# Patient Record
Sex: FEMALE | Race: WHITE | NOT HISPANIC OR LATINO | Employment: OTHER | URBAN - METROPOLITAN AREA
[De-identification: names, ages, dates, MRNs, and addresses within clinical notes are randomized per-mention and may not be internally consistent; named-entity substitution may affect disease eponyms.]

---

## 2017-01-13 ENCOUNTER — OFFICE VISIT - HEALTHEAST (OUTPATIENT)
Dept: FAMILY MEDICINE | Facility: CLINIC | Age: 67
End: 2017-01-13

## 2017-01-13 ENCOUNTER — RECORDS - HEALTHEAST (OUTPATIENT)
Dept: GENERAL RADIOLOGY | Facility: CLINIC | Age: 67
End: 2017-01-13

## 2017-01-13 DIAGNOSIS — M19.011 PRIMARY OSTEOARTHRITIS OF BOTH SHOULDERS: ICD-10-CM

## 2017-01-13 DIAGNOSIS — Z01.818 PREOP EXAMINATION: ICD-10-CM

## 2017-01-13 DIAGNOSIS — R60.9 EDEMA: ICD-10-CM

## 2017-01-13 DIAGNOSIS — M19.012 PRIMARY OSTEOARTHRITIS OF BOTH SHOULDERS: ICD-10-CM

## 2017-01-13 DIAGNOSIS — Z01.818 ENCOUNTER FOR OTHER PREPROCEDURAL EXAMINATION: ICD-10-CM

## 2017-01-13 DIAGNOSIS — L29.9 PRURITUS: ICD-10-CM

## 2017-01-13 DIAGNOSIS — Z87.440 HISTORY OF RECURRENT UTIS: ICD-10-CM

## 2017-01-13 ASSESSMENT — MIFFLIN-ST. JEOR: SCORE: 1251.38

## 2017-01-16 ENCOUNTER — COMMUNICATION - HEALTHEAST (OUTPATIENT)
Dept: FAMILY MEDICINE | Facility: CLINIC | Age: 67
End: 2017-01-16

## 2017-01-18 ENCOUNTER — COMMUNICATION - HEALTHEAST (OUTPATIENT)
Dept: FAMILY MEDICINE | Facility: CLINIC | Age: 67
End: 2017-01-18

## 2017-01-18 ENCOUNTER — AMBULATORY - HEALTHEAST (OUTPATIENT)
Dept: NURSING | Facility: CLINIC | Age: 67
End: 2017-01-18

## 2017-01-18 DIAGNOSIS — Z01.818 PREOP EXAMINATION: ICD-10-CM

## 2017-01-18 LAB
ATRIAL RATE - MUSE: 80 BPM
DIASTOLIC BLOOD PRESSURE - MUSE: NORMAL MMHG
INTERPRETATION ECG - MUSE: NORMAL
P AXIS - MUSE: 75 DEGREES
PR INTERVAL - MUSE: 160 MS
QRS DURATION - MUSE: 88 MS
QT - MUSE: 376 MS
QTC - MUSE: 433 MS
R AXIS - MUSE: 37 DEGREES
SYSTOLIC BLOOD PRESSURE - MUSE: NORMAL MMHG
T AXIS - MUSE: 52 DEGREES
VENTRICULAR RATE- MUSE: 80 BPM

## 2017-01-30 RX ORDER — TRIAMCINOLONE ACETONIDE 1 MG/G
CREAM TOPICAL 4 TIMES DAILY
COMMUNITY
End: 2021-08-23

## 2017-01-30 RX ORDER — LORATADINE 10 MG/1
10 CAPSULE, LIQUID FILLED ORAL DAILY
Status: ON HOLD | COMMUNITY
End: 2017-02-02

## 2017-02-01 ENCOUNTER — ANESTHESIA EVENT (OUTPATIENT)
Dept: SURGERY | Facility: CLINIC | Age: 67
DRG: 483 | End: 2017-02-01
Payer: MEDICARE

## 2017-02-01 ENCOUNTER — HOSPITAL ENCOUNTER (INPATIENT)
Facility: CLINIC | Age: 67
LOS: 4 days | Discharge: HOME | DRG: 483 | End: 2017-02-05
Attending: ORTHOPAEDIC SURGERY | Admitting: ORTHOPAEDIC SURGERY
Payer: MEDICARE

## 2017-02-01 ENCOUNTER — APPOINTMENT (OUTPATIENT)
Dept: GENERAL RADIOLOGY | Facility: CLINIC | Age: 67
DRG: 483 | End: 2017-02-01
Attending: ORTHOPAEDIC SURGERY
Payer: MEDICARE

## 2017-02-01 ENCOUNTER — ANESTHESIA (OUTPATIENT)
Dept: SURGERY | Facility: CLINIC | Age: 67
DRG: 483 | End: 2017-02-01
Payer: MEDICARE

## 2017-02-01 DIAGNOSIS — Z96.611 PRESENCE OF ARTIFICIAL SHOULDER JOINT, RIGHT: Primary | ICD-10-CM

## 2017-02-01 PROBLEM — Z96.619 PRESENCE OF ARTIFICIAL SHOULDER JOINT: Status: ACTIVE | Noted: 2017-02-01

## 2017-02-01 LAB
ABO + RH BLD: NORMAL
ABO + RH BLD: NORMAL
BLD GP AB SCN SERPL QL: NORMAL
BLOOD BANK CMNT PATIENT-IMP: NORMAL
HGB BLD-MCNC: 12.9 G/DL (ref 11.7–15.7)
SPECIMEN EXP DATE BLD: NORMAL

## 2017-02-01 PROCEDURE — 40000940 XR SHOULDER LT PORT G/E 2 VW: Mod: LT

## 2017-02-01 PROCEDURE — 25000128 H RX IP 250 OP 636: Performed by: NURSE ANESTHETIST, CERTIFIED REGISTERED

## 2017-02-01 PROCEDURE — 25000125 ZZHC RX 250: Performed by: ORTHOPAEDIC SURGERY

## 2017-02-01 PROCEDURE — 86850 RBC ANTIBODY SCREEN: CPT | Performed by: ORTHOPAEDIC SURGERY

## 2017-02-01 PROCEDURE — 25000125 ZZHC RX 250: Performed by: ANESTHESIOLOGY

## 2017-02-01 PROCEDURE — A9270 NON-COVERED ITEM OR SERVICE: HCPCS | Mod: GY | Performed by: ORTHOPAEDIC SURGERY

## 2017-02-01 PROCEDURE — 0RRK0JZ REPLACEMENT OF LEFT SHOULDER JOINT WITH SYNTHETIC SUBSTITUTE, OPEN APPROACH: ICD-10-PCS | Performed by: ORTHOPAEDIC SURGERY

## 2017-02-01 PROCEDURE — 71000014 ZZH RECOVERY PHASE 1 LEVEL 2 FIRST HR: Performed by: ORTHOPAEDIC SURGERY

## 2017-02-01 PROCEDURE — S0077 INJECTION, CLINDAMYCIN PHOSP: HCPCS | Performed by: ORTHOPAEDIC SURGERY

## 2017-02-01 PROCEDURE — 36415 COLL VENOUS BLD VENIPUNCTURE: CPT | Performed by: ORTHOPAEDIC SURGERY

## 2017-02-01 PROCEDURE — 27110028 ZZH OR GENERAL SUPPLY NON-STERILE: Performed by: ORTHOPAEDIC SURGERY

## 2017-02-01 PROCEDURE — 36000064 ZZH SURGERY LEVEL 4 EA 15 ADDTL MIN - UMMC: Performed by: ORTHOPAEDIC SURGERY

## 2017-02-01 PROCEDURE — 25000128 H RX IP 250 OP 636: Performed by: ANESTHESIOLOGY

## 2017-02-01 PROCEDURE — 27210794 ZZH OR GENERAL SUPPLY STERILE: Performed by: ORTHOPAEDIC SURGERY

## 2017-02-01 PROCEDURE — 25000125 ZZHC RX 250: Performed by: NURSE ANESTHETIST, CERTIFIED REGISTERED

## 2017-02-01 PROCEDURE — 36000062 ZZH SURGERY LEVEL 4 1ST 30 MIN - UMMC: Performed by: ORTHOPAEDIC SURGERY

## 2017-02-01 PROCEDURE — 37000008 ZZH ANESTHESIA TECHNICAL FEE, 1ST 30 MIN: Performed by: ORTHOPAEDIC SURGERY

## 2017-02-01 PROCEDURE — 86901 BLOOD TYPING SEROLOGIC RH(D): CPT | Performed by: ORTHOPAEDIC SURGERY

## 2017-02-01 PROCEDURE — 25000566 ZZH SEVOFLURANE, EA 15 MIN: Performed by: ORTHOPAEDIC SURGERY

## 2017-02-01 PROCEDURE — C9290 INJ, BUPIVACAINE LIPOSOME: HCPCS | Performed by: ANESTHESIOLOGY

## 2017-02-01 PROCEDURE — 12000001 ZZH R&B MED SURG/OB UMMC

## 2017-02-01 PROCEDURE — 25000128 H RX IP 250 OP 636: Performed by: ORTHOPAEDIC SURGERY

## 2017-02-01 PROCEDURE — 25000132 ZZH RX MED GY IP 250 OP 250 PS 637: Mod: GY | Performed by: ORTHOPAEDIC SURGERY

## 2017-02-01 PROCEDURE — 37000009 ZZH ANESTHESIA TECHNICAL FEE, EACH ADDTL 15 MIN: Performed by: ORTHOPAEDIC SURGERY

## 2017-02-01 PROCEDURE — 40000171 ZZH STATISTIC PRE-PROCEDURE ASSESSMENT III: Performed by: ORTHOPAEDIC SURGERY

## 2017-02-01 PROCEDURE — C1776 JOINT DEVICE (IMPLANTABLE): HCPCS | Performed by: ORTHOPAEDIC SURGERY

## 2017-02-01 PROCEDURE — 85018 HEMOGLOBIN: CPT | Performed by: ORTHOPAEDIC SURGERY

## 2017-02-01 PROCEDURE — 25800025 ZZH RX 258: Performed by: ORTHOPAEDIC SURGERY

## 2017-02-01 PROCEDURE — 99222 1ST HOSP IP/OBS MODERATE 55: CPT | Performed by: INTERNAL MEDICINE

## 2017-02-01 PROCEDURE — 27810169 ZZH OR IMPLANT GENERAL: Performed by: ORTHOPAEDIC SURGERY

## 2017-02-01 PROCEDURE — 25800025 ZZH RX 258: Performed by: NURSE ANESTHETIST, CERTIFIED REGISTERED

## 2017-02-01 PROCEDURE — 25000132 ZZH RX MED GY IP 250 OP 250 PS 637: Mod: GY | Performed by: INTERNAL MEDICINE

## 2017-02-01 PROCEDURE — 86900 BLOOD TYPING SEROLOGIC ABO: CPT | Performed by: ORTHOPAEDIC SURGERY

## 2017-02-01 DEVICE — IMPLANTABLE DEVICE: Type: IMPLANTABLE DEVICE | Site: SHOULDER | Status: FUNCTIONAL

## 2017-02-01 DEVICE — IMP COMP GLENOID ZIM 46MM 00-4300-086-00: Type: IMPLANTABLE DEVICE | Site: SHOULDER | Status: FUNCTIONAL

## 2017-02-01 DEVICE — BONE CEMENT SIMPLEX W/TOBRAMYCIN 6197-9-001: Type: IMPLANTABLE DEVICE | Site: SHOULDER | Status: FUNCTIONAL

## 2017-02-01 RX ORDER — METOCLOPRAMIDE 5 MG/1
5 TABLET ORAL EVERY 6 HOURS PRN
Status: DISCONTINUED | OUTPATIENT
Start: 2017-02-01 | End: 2017-02-05 | Stop reason: HOSPADM

## 2017-02-01 RX ORDER — CIPROFLOXACIN 500 MG/1
500 TABLET, FILM COATED ORAL 2 TIMES DAILY
Status: DISCONTINUED | OUTPATIENT
Start: 2017-02-01 | End: 2017-02-01

## 2017-02-01 RX ORDER — ACETAMINOPHEN 325 MG/1
975 TABLET ORAL EVERY 8 HOURS
Status: COMPLETED | OUTPATIENT
Start: 2017-02-01 | End: 2017-02-04

## 2017-02-01 RX ORDER — BUPIVACAINE HYDROCHLORIDE AND EPINEPHRINE 5; 5 MG/ML; UG/ML
INJECTION, SOLUTION PERINEURAL PRN
Status: DISCONTINUED | OUTPATIENT
Start: 2017-02-01 | End: 2017-02-01

## 2017-02-01 RX ORDER — LORATADINE 10 MG/1
10 TABLET ORAL DAILY
Status: DISCONTINUED | OUTPATIENT
Start: 2017-02-02 | End: 2017-02-05 | Stop reason: HOSPADM

## 2017-02-01 RX ORDER — ONDANSETRON 2 MG/ML
INJECTION INTRAMUSCULAR; INTRAVENOUS PRN
Status: DISCONTINUED | OUTPATIENT
Start: 2017-02-01 | End: 2017-02-01

## 2017-02-01 RX ORDER — OXYCODONE HYDROCHLORIDE 5 MG/1
5-10 TABLET ORAL EVERY 4 HOURS PRN
Status: DISCONTINUED | OUTPATIENT
Start: 2017-02-01 | End: 2017-02-03

## 2017-02-01 RX ORDER — ONDANSETRON 2 MG/ML
4 INJECTION INTRAMUSCULAR; INTRAVENOUS EVERY 30 MIN PRN
Status: DISCONTINUED | OUTPATIENT
Start: 2017-02-01 | End: 2017-02-01 | Stop reason: HOSPADM

## 2017-02-01 RX ORDER — NALOXONE HYDROCHLORIDE 0.4 MG/ML
.1-.4 INJECTION, SOLUTION INTRAMUSCULAR; INTRAVENOUS; SUBCUTANEOUS
Status: DISCONTINUED | OUTPATIENT
Start: 2017-02-01 | End: 2017-02-01 | Stop reason: HOSPADM

## 2017-02-01 RX ORDER — GLYCOPYRROLATE 0.2 MG/ML
INJECTION, SOLUTION INTRAMUSCULAR; INTRAVENOUS PRN
Status: DISCONTINUED | OUTPATIENT
Start: 2017-02-01 | End: 2017-02-01

## 2017-02-01 RX ORDER — FLUMAZENIL 0.1 MG/ML
0.2 INJECTION, SOLUTION INTRAVENOUS
Status: DISCONTINUED | OUTPATIENT
Start: 2017-02-01 | End: 2017-02-01 | Stop reason: HOSPADM

## 2017-02-01 RX ORDER — ONDANSETRON 4 MG/1
4 TABLET, ORALLY DISINTEGRATING ORAL EVERY 30 MIN PRN
Status: DISCONTINUED | OUTPATIENT
Start: 2017-02-01 | End: 2017-02-01 | Stop reason: HOSPADM

## 2017-02-01 RX ORDER — PROPOFOL 10 MG/ML
INJECTION, EMULSION INTRAVENOUS PRN
Status: DISCONTINUED | OUTPATIENT
Start: 2017-02-01 | End: 2017-02-01

## 2017-02-01 RX ORDER — CLINDAMYCIN PHOSPHATE 900 MG/50ML
900 INJECTION, SOLUTION INTRAVENOUS SEE ADMIN INSTRUCTIONS
Status: DISCONTINUED | OUTPATIENT
Start: 2017-02-01 | End: 2017-02-01 | Stop reason: HOSPADM

## 2017-02-01 RX ORDER — SODIUM CHLORIDE, SODIUM LACTATE, POTASSIUM CHLORIDE, CALCIUM CHLORIDE 600; 310; 30; 20 MG/100ML; MG/100ML; MG/100ML; MG/100ML
INJECTION, SOLUTION INTRAVENOUS CONTINUOUS PRN
Status: DISCONTINUED | OUTPATIENT
Start: 2017-02-01 | End: 2017-02-01

## 2017-02-01 RX ORDER — FENTANYL CITRATE 50 UG/ML
25-50 INJECTION, SOLUTION INTRAMUSCULAR; INTRAVENOUS
Status: DISCONTINUED | OUTPATIENT
Start: 2017-02-01 | End: 2017-02-01 | Stop reason: HOSPADM

## 2017-02-01 RX ORDER — HYDROMORPHONE HYDROCHLORIDE 1 MG/ML
.3-.5 INJECTION, SOLUTION INTRAMUSCULAR; INTRAVENOUS; SUBCUTANEOUS
Status: DISCONTINUED | OUTPATIENT
Start: 2017-02-01 | End: 2017-02-05 | Stop reason: HOSPADM

## 2017-02-01 RX ORDER — SODIUM CHLORIDE, SODIUM LACTATE, POTASSIUM CHLORIDE, CALCIUM CHLORIDE 600; 310; 30; 20 MG/100ML; MG/100ML; MG/100ML; MG/100ML
INJECTION, SOLUTION INTRAVENOUS CONTINUOUS
Status: DISCONTINUED | OUTPATIENT
Start: 2017-02-01 | End: 2017-02-01 | Stop reason: HOSPADM

## 2017-02-01 RX ORDER — HYDROMORPHONE HYDROCHLORIDE 1 MG/ML
.3-.5 INJECTION, SOLUTION INTRAMUSCULAR; INTRAVENOUS; SUBCUTANEOUS EVERY 5 MIN PRN
Status: DISCONTINUED | OUTPATIENT
Start: 2017-02-01 | End: 2017-02-01 | Stop reason: HOSPADM

## 2017-02-01 RX ORDER — ACETAMINOPHEN 325 MG/1
650 TABLET ORAL EVERY 4 HOURS PRN
Status: DISCONTINUED | OUTPATIENT
Start: 2017-02-04 | End: 2017-02-05 | Stop reason: HOSPADM

## 2017-02-01 RX ORDER — CLINDAMYCIN PHOSPHATE 900 MG/50ML
900 INJECTION, SOLUTION INTRAVENOUS
Status: COMPLETED | OUTPATIENT
Start: 2017-02-01 | End: 2017-02-01

## 2017-02-01 RX ORDER — SODIUM CHLORIDE, SODIUM LACTATE, POTASSIUM CHLORIDE, CALCIUM CHLORIDE 600; 310; 30; 20 MG/100ML; MG/100ML; MG/100ML; MG/100ML
INJECTION, SOLUTION INTRAVENOUS CONTINUOUS
Status: DISCONTINUED | OUTPATIENT
Start: 2017-02-01 | End: 2017-02-02

## 2017-02-01 RX ORDER — LIDOCAINE 40 MG/G
CREAM TOPICAL
Status: DISCONTINUED | OUTPATIENT
Start: 2017-02-01 | End: 2017-02-05 | Stop reason: HOSPADM

## 2017-02-01 RX ORDER — ONDANSETRON 4 MG/1
4 TABLET, ORALLY DISINTEGRATING ORAL EVERY 6 HOURS PRN
Status: DISCONTINUED | OUTPATIENT
Start: 2017-02-01 | End: 2017-02-05 | Stop reason: HOSPADM

## 2017-02-01 RX ORDER — CLINDAMYCIN PHOSPHATE 900 MG/50ML
900 INJECTION, SOLUTION INTRAVENOUS EVERY 8 HOURS
Status: COMPLETED | OUTPATIENT
Start: 2017-02-01 | End: 2017-02-02

## 2017-02-01 RX ORDER — AMOXICILLIN AND CLAVULANATE POTASSIUM 250; 125 MG/1; MG/1
1 TABLET, FILM COATED ORAL
Status: DISCONTINUED | OUTPATIENT
Start: 2017-02-02 | End: 2017-02-05 | Stop reason: HOSPADM

## 2017-02-01 RX ORDER — FENTANYL CITRATE 50 UG/ML
INJECTION, SOLUTION INTRAMUSCULAR; INTRAVENOUS PRN
Status: DISCONTINUED | OUTPATIENT
Start: 2017-02-01 | End: 2017-02-01

## 2017-02-01 RX ORDER — DEXAMETHASONE SODIUM PHOSPHATE 4 MG/ML
INJECTION, SOLUTION INTRA-ARTICULAR; INTRALESIONAL; INTRAMUSCULAR; INTRAVENOUS; SOFT TISSUE PRN
Status: DISCONTINUED | OUTPATIENT
Start: 2017-02-01 | End: 2017-02-01

## 2017-02-01 RX ORDER — NEOSTIGMINE METHYLSULFATE 1 MG/ML
VIAL (ML) INJECTION PRN
Status: DISCONTINUED | OUTPATIENT
Start: 2017-02-01 | End: 2017-02-01

## 2017-02-01 RX ORDER — ACETAMINOPHEN 160 MG
TABLET,DISINTEGRATING ORAL PRN
Status: DISCONTINUED | OUTPATIENT
Start: 2017-02-01 | End: 2017-02-01 | Stop reason: HOSPADM

## 2017-02-01 RX ORDER — PROCHLORPERAZINE MALEATE 5 MG
5 TABLET ORAL EVERY 6 HOURS PRN
Status: DISCONTINUED | OUTPATIENT
Start: 2017-02-01 | End: 2017-02-05 | Stop reason: HOSPADM

## 2017-02-01 RX ORDER — AMOXICILLIN 250 MG
1-2 CAPSULE ORAL 2 TIMES DAILY
Status: DISCONTINUED | OUTPATIENT
Start: 2017-02-01 | End: 2017-02-05 | Stop reason: HOSPADM

## 2017-02-01 RX ORDER — METOCLOPRAMIDE HYDROCHLORIDE 5 MG/ML
5 INJECTION INTRAMUSCULAR; INTRAVENOUS EVERY 6 HOURS PRN
Status: DISCONTINUED | OUTPATIENT
Start: 2017-02-01 | End: 2017-02-05 | Stop reason: HOSPADM

## 2017-02-01 RX ORDER — ONDANSETRON 2 MG/ML
4 INJECTION INTRAMUSCULAR; INTRAVENOUS EVERY 6 HOURS PRN
Status: DISCONTINUED | OUTPATIENT
Start: 2017-02-01 | End: 2017-02-05 | Stop reason: HOSPADM

## 2017-02-01 RX ORDER — CALCIUM CARBONATE 500 MG/1
500-1000 TABLET, CHEWABLE ORAL 4 TIMES DAILY PRN
Status: DISCONTINUED | OUTPATIENT
Start: 2017-02-01 | End: 2017-02-05 | Stop reason: HOSPADM

## 2017-02-01 RX ORDER — NALOXONE HYDROCHLORIDE 0.4 MG/ML
.1-.4 INJECTION, SOLUTION INTRAMUSCULAR; INTRAVENOUS; SUBCUTANEOUS
Status: DISCONTINUED | OUTPATIENT
Start: 2017-02-01 | End: 2017-02-05 | Stop reason: HOSPADM

## 2017-02-01 RX ADMIN — PROPOFOL 130 MG: 10 INJECTION, EMULSION INTRAVENOUS at 12:32

## 2017-02-01 RX ADMIN — ONDANSETRON 4 MG: 2 INJECTION INTRAMUSCULAR; INTRAVENOUS at 16:39

## 2017-02-01 RX ADMIN — FENTANYL CITRATE 25 MCG: 50 INJECTION, SOLUTION INTRAMUSCULAR; INTRAVENOUS at 15:34

## 2017-02-01 RX ADMIN — ACETAMINOPHEN 975 MG: 325 TABLET, FILM COATED ORAL at 18:21

## 2017-02-01 RX ADMIN — PHENYLEPHRINE HYDROCHLORIDE 100 MCG: 10 INJECTION, SOLUTION INTRAMUSCULAR; INTRAVENOUS; SUBCUTANEOUS at 12:48

## 2017-02-01 RX ADMIN — PHENYLEPHRINE HYDROCHLORIDE 100 MCG: 10 INJECTION, SOLUTION INTRAMUSCULAR; INTRAVENOUS; SUBCUTANEOUS at 12:56

## 2017-02-01 RX ADMIN — MIDAZOLAM HYDROCHLORIDE 2 MG: 1 INJECTION, SOLUTION INTRAMUSCULAR; INTRAVENOUS at 12:19

## 2017-02-01 RX ADMIN — Medication 40 MG: at 12:32

## 2017-02-01 RX ADMIN — PROPOFOL 30 MG: 10 INJECTION, EMULSION INTRAVENOUS at 13:34

## 2017-02-01 RX ADMIN — SODIUM CHLORIDE, POTASSIUM CHLORIDE, SODIUM LACTATE AND CALCIUM CHLORIDE: 600; 310; 30; 20 INJECTION, SOLUTION INTRAVENOUS at 13:39

## 2017-02-01 RX ADMIN — HYDROMORPHONE HYDROCHLORIDE 0.3 MG: 10 INJECTION, SOLUTION INTRAMUSCULAR; INTRAVENOUS; SUBCUTANEOUS at 16:10

## 2017-02-01 RX ADMIN — Medication 0.3 MCG/KG/MIN: at 13:02

## 2017-02-01 RX ADMIN — BUPIVACAINE HYDROCHLORIDE AND EPINEPHRINE BITARTRATE 10 ML: 5; .005 INJECTION, SOLUTION PERINEURAL at 11:04

## 2017-02-01 RX ADMIN — SODIUM CHLORIDE, POTASSIUM CHLORIDE, SODIUM LACTATE AND CALCIUM CHLORIDE: 600; 310; 30; 20 INJECTION, SOLUTION INTRAVENOUS at 20:24

## 2017-02-01 RX ADMIN — NEOSTIGMINE METHYLSULFATE 3 MG: 1 INJECTION INTRAMUSCULAR; INTRAVENOUS; SUBCUTANEOUS at 14:59

## 2017-02-01 RX ADMIN — FENTANYL CITRATE 50 MCG: 50 INJECTION, SOLUTION INTRAMUSCULAR; INTRAVENOUS at 11:03

## 2017-02-01 RX ADMIN — FENTANYL CITRATE 50 MCG: 50 INJECTION, SOLUTION INTRAMUSCULAR; INTRAVENOUS at 15:47

## 2017-02-01 RX ADMIN — Medication 1 LOZENGE: at 19:34

## 2017-02-01 RX ADMIN — BUPIVACAINE 10 ML: 13.3 INJECTION, SUSPENSION, LIPOSOMAL INFILTRATION at 11:04

## 2017-02-01 RX ADMIN — PHENYLEPHRINE HYDROCHLORIDE 100 MCG: 10 INJECTION, SOLUTION INTRAMUSCULAR; INTRAVENOUS; SUBCUTANEOUS at 13:01

## 2017-02-01 RX ADMIN — CLINDAMYCIN PHOSPHATE 900 MG: 18 INJECTION, SOLUTION INTRAVENOUS at 12:40

## 2017-02-01 RX ADMIN — SODIUM CHLORIDE, POTASSIUM CHLORIDE, SODIUM LACTATE AND CALCIUM CHLORIDE: 600; 310; 30; 20 INJECTION, SOLUTION INTRAVENOUS at 12:19

## 2017-02-01 RX ADMIN — HYDROMORPHONE HYDROCHLORIDE 0.3 MG: 10 INJECTION, SOLUTION INTRAMUSCULAR; INTRAVENOUS; SUBCUTANEOUS at 15:55

## 2017-02-01 RX ADMIN — ONDANSETRON 4 MG: 2 INJECTION INTRAMUSCULAR; INTRAVENOUS at 17:18

## 2017-02-01 RX ADMIN — FENTANYL CITRATE 25 MCG: 50 INJECTION, SOLUTION INTRAMUSCULAR; INTRAVENOUS at 15:39

## 2017-02-01 RX ADMIN — MIDAZOLAM HYDROCHLORIDE 1 MG: 1 INJECTION, SOLUTION INTRAMUSCULAR; INTRAVENOUS at 11:03

## 2017-02-01 RX ADMIN — FENTANYL CITRATE 100 MCG: 50 INJECTION, SOLUTION INTRAMUSCULAR; INTRAVENOUS at 12:32

## 2017-02-01 RX ADMIN — GLYCOPYRROLATE 0.6 MG: 0.2 INJECTION, SOLUTION INTRAMUSCULAR; INTRAVENOUS at 14:59

## 2017-02-01 RX ADMIN — SENNOSIDES AND DOCUSATE SODIUM 2 TABLET: 8.6; 5 TABLET ORAL at 20:04

## 2017-02-01 RX ADMIN — ONDANSETRON 4 MG: 2 INJECTION INTRAMUSCULAR; INTRAVENOUS at 14:33

## 2017-02-01 RX ADMIN — DEXAMETHASONE SODIUM PHOSPHATE 4 MG: 4 INJECTION, SOLUTION INTRAMUSCULAR; INTRAVENOUS at 13:06

## 2017-02-01 RX ADMIN — CLINDAMYCIN PHOSPHATE 900 MG: 18 INJECTION, SOLUTION INTRAVENOUS at 20:04

## 2017-02-01 RX ADMIN — FENTANYL CITRATE 25 MCG: 50 INJECTION, SOLUTION INTRAMUSCULAR; INTRAVENOUS at 14:30

## 2017-02-01 NOTE — LETTER
Transition Communication Hand-off for Care Transitions to Next Level of Care Provider    Name: Cliff Dai  MRN #: [id number]  Primary Care Provider: MARIUM KRUEGER     Primary Clinic: [address] [address]     Reason for Hospitalization:  Glenohumeral Osteoarthritis  Presence of artificial shoulder joint  Admit Date/Time: 2/1/2017  9:33 AM  Discharge Date: TBD  Payor Source: Payor: MEDICA / Plan: MEDICA PRIME SOLUTION / Product Type: Indemnity /          Reason for Communication Hand-off Referral: Avoidable readmission within 30 days    Discharge Needs Assessment:  Needs       Most Recent Value    Equipment Currently Used at Home none    Transportation Available car, family or friend will provide        Follow-up specialty is recommended: Yes    Follow-up plan:  Future Appointments  Date Time Provider Department Center   2/4/2017 11:15 AM Rachel Hawkins OT [facility] [place]   2/4/2017 1:45 PM Rachel Hawkins OT [facility] [place]       Sheela Padron RN, BSN  Care Coordinator,   Phone [phone]  Pager [phone]    AVS/Discharge Summary is the source of truth; this is a helpful guide for improved communication of patient story

## 2017-02-01 NOTE — ANESTHESIA CARE TRANSFER NOTE
Patient: Cliff Dai    Procedure(s):  Left Total Shoulder Arthroplasty   - Wound Class: I-Clean    Diagnosis: Glenohumeral Osteoarthritis  Diagnosis Additional Information: No value filed.    Anesthesia Type:   General, ETT, Periph. Nerve Block for postop pain     Note:  Airway :Face Mask  Patient transferred to:PACU        Vitals: (Last set prior to Anesthesia Care Transfer)    CRNA VITALS  2/1/2017 1441 - 2/1/2017 1517      2/1/2017             NIBP: 115/81 mmHg    Pulse: 100    NIBP Mean: 88    SpO2: 97 %    Resp Rate (observed): (!) 6    Resp Rate (set): 10                Electronically Signed By: KRYS Lynn CRNA  February 1, 2017  3:17 PM

## 2017-02-01 NOTE — ANESTHESIA PROCEDURE NOTES
Peripheral Nerve Block Procedure Note    Staff:     Anesthesiologist:  ELYSIA REDMAN    Resident/CRNA:  LITO CHRISTENSEN    Block performed by resident/CRNA in the presence of a teaching physician    Location: Pre-op  Procedure Start/Stop TImes:      2/1/2017 10:58 AM     2/1/2017 11:07 AM    patient identified, IV checked, site marked, risks and benefits discussed, informed consent, monitors and equipment checked, pre-op evaluation, at physician/surgeon's request and post-op pain management      Correct Patient: Yes      Correct Position: Yes      Correct Site: Yes      Correct Procedure: Yes      Correct Laterality:  Yes    Site Marked:  Yes  Procedure details:     Procedure:  Interscalene    Laterality:  Left    Position:  Supine    Sterile Prep: chloraprep      Needle:  Short bevel    Needle gauge:  20    Needle length (mm):  100    Ultrasound: Yes      Ultrasound used to identify targeted nerve, plexus, or vascular structure and placed a needle adjacent to it      Permanent Image entered into patiient's record      Abnormal pain on injection: No      Blood Aspirated: No      Paresthesias:  No    Bleeding at site: No      Bolus via:  Needle    Infusion Method:  Single Shot    Blood aspirated via catheter: No      Complications:  None

## 2017-02-01 NOTE — ANESTHESIA POSTPROCEDURE EVALUATION
Patient: Cliff Dai    Procedure(s):  Left Total Shoulder Arthroplasty   - Wound Class: I-Clean    Diagnosis:Glenohumeral Osteoarthritis  Diagnosis Additional Information: No value filed.    Anesthesia Type:  General, ETT, Periph. Nerve Block for postop pain    Note:  Anesthesia Post Evaluation    Patient location during evaluation: PACU  Patient participation: Able to participate in evaluation but full recovery from regional anesthesia has not yet occurred and is not anticipated to occur within 48 hours  Level of consciousness: awake  Pain management: adequate  Airway patency: patent  Cardiovascular status: acceptable and stable  Respiratory status: acceptable and room air  Hydration status: acceptable  PONV: none     Anesthetic complications: None          Last vitals:  Filed Vitals:    02/01/17 1615 02/01/17 1630 02/01/17 1717   BP: 102/59  105/50   Temp: 36.2  C (97.2  F)     Resp: 20 18 18   SpO2: 94% 95% 98%         Electronically Signed By: Idris Arriaga MD  February 1, 2017  5:02 PM

## 2017-02-01 NOTE — IP AVS SNAPSHOT
MRN:[id number]                      After Visit Summary   2/1/2017    Cliff Dai    MRN: [id number]           Thank you!     Thank you for choosing [facility] for your care. Our goal is always to provide you with excellent care. Hearing back from our patients is one way we can continue to improve our services. Please take a few minutes to complete the written survey that you may receive in the mail after you visit with us. Thank you!        Patient Information     Date Of Birth          1950        About your hospital stay     You were admitted on:  February 1, 2017 You last received care in the:  [facility]    You were discharged on:  February 5, 2017        Reason for your hospital stay       You had shoulder surgery                  Who to Call     For medical emergencies, please call [phone].  For non-urgent questions about your medical care, please call your primary care provider or clinic, [phone]  For questions related to your surgery, please call your surgery clinic        Attending Provider     Provider    Lui Gonzalez MD       Primary Care Provider Office Phone # Fax #    Hyun FRANCOIS RupertokarolConneragnieszkafide [phone]       [place] [address]         When to contact your care team       Call your physician for fevers greater than 101.5, chills, increased pain, redness, swelling or discharge at the surgical site. During regular business hours call Dr Mota's office and request to speak with his nurse or the triage nurses. If you see him at [facility] call [phone]. If you see him at [facility] call [phone]. After hours or on weekends call the hospital  at [phone] and ask to speak with the resident on call.                  After Care Instructions     Activity       Your activity upon discharge: as tolerated, sling at all times. You may remove it for hygiene and dressing.            Wound  "care and dressings       Instructions to care for your wound at home:you have a brown dressing on that can remain in place for 5-7 days. You may shower over this dressing.  At the end of that time, remove the dressing and leave wound open to air if it is dry. You may shower with your dressing off if it is clean and dry.                  Follow-up Appointments     Follow Up and recommended labs and tests       Follow up with Dr Gonzalez at [facility] in two weeks. Call the office at [phone] to schedule an appointment if you have not already done so.            Follow Up and recommended labs and tests       You need to have a/p lateral xray at your two week follow up with Dr Gonzalez.                  Pending Results     No orders found from 1/31/2017 to 2/2/2017.            Statement of Approval     Ordered          02/04/17 0947  I have reviewed and agree with all the recommendations and orders detailed in this document.   EFFECTIVE NOW     Approved and electronically signed by:  Jonh De Los Santos MD           02/04/17 3616  I have reviewed and agree with all the recommendations and orders detailed in this document.   EFFECTIVE NOW     Approved and electronically signed by:  Ej Charlton MD             Admission Information        Provider Department Dept Phone    2/1/2017 Lui Gonzalez MD [facility] [phone]      Your Vitals Were     Blood Pressure Pulse Temperature    107/64 mmHg 96 96.8  F (36  C) (Oral)    Respirations Height Weight    16 1.638 m (5' 4.5\") 74.8 kg (164 lb 14.5 oz)    BMI (Body Mass Index) Pulse Oximetry       27.88 kg/m2 96%       MyChart Information     [facility] lets you send messages to your doctor, view your test results, renew your prescriptions, schedule appointments and more. To sign up, go to [email or web address] . Click on \"Log in\" on the left side of the screen, which will take you to the Welcome page. Then click on \"Sign up Now\" on the right side of the page. "     You will be asked to enter the access code listed below, as well as some personal information. Please follow the directions to create your username and password.     Your access code is: [email or web address]  Expires: 2017  8:03 AM     Your access code will  in 90 days. If you need help or a new code, please call your [facility] clinic or [phone].        Care EveryWhere ID     This is your Care EveryWhere ID. This could be used by other organizations to access your [facility] medical records  [id number]           Review of your medicines      START taking        Dose / Directions    HYDROmorphone 2 MG tablet   Commonly known as:  DILAUDID   Used for:  Presence of artificial shoulder joint, right        Dose:  2-4 mg   Take 1-2 tablets (2-4 mg) by mouth every 3 hours as needed for moderate to severe pain   Quantity:  60 tablet   Refills:  0       loratadine 10 MG tablet   Commonly known as:  CLARITIN   Used for:  Presence of artificial shoulder joint, right   Replaces:  CLARITIN 10 MG capsule        Dose:  10 mg   Take 1 tablet (10 mg) by mouth daily   Quantity:  30 tablet   Refills:  0         CONTINUE these medicines which may have CHANGED, or have new prescriptions. If we are uncertain of the size of tablets/capsules you have at home, strength may be listed as something that might have changed.        Dose / Directions    acetaminophen 325 MG tablet   Commonly known as:  TYLENOL   This may have changed:    - medication strength  - how much to take  - when to take this  - reasons to take this   Used for:  Presence of artificial shoulder joint, right        Dose:  650 mg   Take 2 tablets (650 mg) by mouth every 4 hours as needed for other (surgical pain)   Quantity:  100 tablet   Refills:  0         CONTINUE these medicines which have NOT CHANGED        Dose / Directions    triamcinolone 0.1 % cream   Commonly known as:  KENALOG        Apply topically 4 times daily   Refills:  0         STOP taking     ALEVE  PO           ciprofloxacin 500 MG tablet   Commonly known as:  CIPRO           CLARITIN 10 MG capsule   Generic drug:  loratadine   Replaced by:  loratadine 10 MG tablet                Where to get your medicines      These medications were sent to [facility] [place], [place] - [address]  [address], [facility] [address]     Phone:  [phone]    - acetaminophen 325 MG tablet  - loratadine 10 MG tablet      Some of these will need a paper prescription and others can be bought over the counter. Ask your nurse if you have questions.     Bring a paper prescription for each of these medications    - HYDROmorphone 2 MG tablet             Protect others around you: Learn how to safely use, store and throw away your medicines at www.disposemymeds.org.             Medication List: This is a list of all your medications and when to take them. Check marks below indicate your daily home schedule. Keep this list as a reference.      Medications           Morning Afternoon Evening Bedtime As Needed    acetaminophen 325 MG tablet   Commonly known as:  TYLENOL   Take 2 tablets (650 mg) by mouth every 4 hours as needed for other (surgical pain)   Last time this was given:  650 mg on 2/5/2017  3:35 AM                                HYDROmorphone 2 MG tablet   Commonly known as:  DILAUDID   Take 1-2 tablets (2-4 mg) by mouth every 3 hours as needed for moderate to severe pain   Last time this was given:  2 mg on 2/5/2017 12:58 PM                                loratadine 10 MG tablet   Commonly known as:  CLARITIN   Take 1 tablet (10 mg) by mouth daily   Last time this was given:  10 mg on 2/5/2017  8:03 AM                                triamcinolone 0.1 % cream   Commonly known as:  KENALOG   Apply topically 4 times daily

## 2017-02-01 NOTE — IP AVS SNAPSHOT
[facility]    [address]    [place] [address]    Phone:  [phone]                                       After Visit Summary   2/1/2017    Cliff Dai    MRN: [id number]           After Visit Summary Signature Page     I have received my discharge instructions, and my questions have been answered. I have discussed any challenges I see with this plan with the nurse or doctor.    ..........................................................................................................................................  Patient/Patient Representative Signature      ..........................................................................................................................................  Patient Representative Print Name and Relationship to Patient    ..................................................               ................................................  Date                                            Time    ..........................................................................................................................................  Reviewed by Signature/Title    ...................................................              ..............................................  Date                                                            Time

## 2017-02-01 NOTE — PROGRESS NOTES
S. Patient was seen today, at R-preop for measurements/delivery of an Ultrasling IV for left shoulder as ordered.    O/goal. To reduce motion and help with post-op support    A. At this time, I have provided patient with a size medium Don Jackie Ultrasling IV. Straps were trimmed and adjusted to achieve a custom fit for the patient.    P. Patient/staffing have been instructed to contact our facility with any future questions and/or concerns.    [facility] Ultrasling IV Instructions

## 2017-02-01 NOTE — ANESTHESIA PREPROCEDURE EVALUATION
Anesthesia Evaluation     . Pt has had prior anesthetic. Type: General    No history of anesthetic complications     ROS/MED HX    ENT/Pulmonary:  - neg pulmonary ROS     Neurologic:  - neg neurologic ROS     Cardiovascular:  - neg cardiovascular ROS       METS/Exercise Tolerance:  >4 METS   Hematologic:  - neg hematologic  ROS       Musculoskeletal:   (+) arthritis, , , -       GI/Hepatic:  - neg GI/hepatic ROS       Renal/Genitourinary:  - ROS Renal section negative       Endo:  - neg endo ROS       Psychiatric:  - neg psychiatric ROS       Infectious Disease:  - neg infectious disease ROS       Malignancy:      - no malignancy   Other: Comment: Sjogren's syndrome              Physical Exam  Normal systems: dental    Airway   Mallampati: II  TM distance: >3 FB  Neck ROM: full    Dental     Cardiovascular   Rhythm and rate: regular      Pulmonary    breath sounds clear to auscultation                    Anesthesia Plan      History & Physical Review  History and physical reviewed and following examination; no interval change.    ASA Status:  2 .    NPO Status:  > 8 hours    Plan for General, ETT and Periph. Nerve Block for postop pain with Intravenous induction. Maintenance will be Balanced.    PONV prophylaxis:  Ondansetron (or other 5HT-3) and Dexamethasone or Solumedrol       Postoperative Care  Postoperative pain management:  Oral pain medications, IV analgesics, Peripheral nerve block (Single Shot) and Multi-modal analgesia.      Consents  Anesthetic plan, risks, benefits and alternatives discussed with:  Patient..                          .

## 2017-02-01 NOTE — BRIEF OP NOTE
Orthopedic Brief Operative Note    Pre-operative diagnosis: Glenohumeral Osteoarthritis   Post-operative diagnosis: SAME   Procedure: Procedure(s):  Left total shoulder ARTHROPLASTY    Surgeon: Lui Gonzalez*    Assistant(s): Jonh De Los Santos MD; Vicky WOODALL   Anesthesia: General endotracheal anesthesia   Estimated blood loss: Less than 100 ml   Total IV fluids: (See anesthesia record)   Total urine output: Not measured   Drains: Hemovac   Specimens: None   Implants: See dictated operative report for full details   Findings: See dictated operative report for full details   Complications: None   Disposition: Stable to PACU     ___________________________________________________________________________  A/P: 66 year old female with L shoulder degenerative joint disease, s/p shoulder arthroplasty.     Plan:  Activity: 5# lifting limit until instructed otherwise in clinic. Supine gravity eliminated PROM, codmans, hand/wrist/elbow ROM encouraged.   Drain: Will d/c POD#1 AM  Hillman:  Remove POD#1 AM  Antibiotics: Abx x 24 hours post op for surgical prophylaxis    Diet:  ADAT  Pain: PO/IV meds. Transition to PO meds only as patient tolerates. No anti-inflammatory medications for six weeks.  DVT ppx:  Ambulation, SCD while inpt  Imaging: (AP and Grashey) in the recovery room  PT/OT: ADL, elbow, wrist, finger ROM; Goals PROM 140FF, 40 ER before DC.   DC with Script for Supine gravity eliminated forward elevation zero to unlimited and passive external rotation at the side with a stick zero to forty degrees.  2x/week for six weeks.  Pulleys will be included on the outpatient prescription if they are included on the inpatient orders  Consult: Appreciate hospitalist assistance with inpt medical management  Dispo:  Pending pain control and PT/OT recs.     Follow up:  2 week post op w/ Dr. Gonzalez    Future Appointments  Date Time Provider Department Center   2/2/2017 8:15 AM Abiola Worthington, OT [facility]   2/2/2017 2:45 PM Antonina De La Paz OT [facility]   ___________________________________________________________________________  Jonh De Los Santos MD  02/01/2017  Pager [phone]

## 2017-02-01 NOTE — PLAN OF CARE
Problem: Goal Outcome Summary  Goal: Goal Outcome Summary  Outcome: Improving  Patient is A&O x 3, awake. Incision dressing Aquacel is CD&I. Hemovac patent with small amount..Pt denies pain but numbness. Pt reassured and advised she had a block to prevent pain but will gradually wear off.VS'S. LS clear on O2 2L per n/c SATs 97%. Encouraged IS use. CMS intact, left radial pulse palpable, pt able to wiggle finger and warm on touch. PIV patent with LR infusing at 100 cc/hr. Hillman patent with clear urine. Pt C/O nausea, Zofran 4 mg given IV with relief. C/O dry eyes, Dr. Charlton notified, ordered artificial tears. Pt preferred to use her own Thera tears.PCD's on bilat LE. Will continue to monitor.      1800: Patient C/O sore throat, [facility] notified for an order.Awaiting for order.

## 2017-02-01 NOTE — OR NURSING
Pt with VSS.  Incision CDI.  Tolerating PO.  Pain controlled.  Awake. Dr Arriaga called for a sign out and he is OK with pt going upstairs.  He will put note in when available.

## 2017-02-01 NOTE — OP NOTE
"DATE OF SURGERY:  02/01/2017      PREOPERATIVE DIAGNOSIS:  Left shoulder endstage glenohumeral osteoarthritis.      POSTOPERATIVE DIAGNOSIS:  Left shoulder endstage glenohumeral osteoarthritis.      SURGICAL PROCEDURE:  Left total shoulder arthroplasty.      SURGEON:  Lui Gonzalez MD      ESTIMATED BLOOD LOSS:  200 mL.      IMPLANTS:   1.  Vicente Bigliani/Flatow Complete Shoulder Solution size 13 x 130 mm press-fit humeral stem.   2.  Vicente Bigliani/Flatow Complete Shoulder Solution size 21 x 46 mm offset modular humeral head (maximum at 10:00).   3.  Vicente Bigliani/Flatow Complete Shoulder Solution size 46 mm pegged glenoid component.   4.  Simplex cement preloaded with antibiotics.      ASSISTANT:  Vicky Alvarado PA-C  The assistance of Vicky Alvarado was necessitated by the orthopedic complexity of the case, the need to position the arm in space, pass and retrieve sutures.  Additionally, she was required to hold retractors.  No other level of surgical assistant would have provided adequate support for the patient's surgical well being.      LEARNER:  Jonh De Los Santos MD, resident.        INDICATIONS:  Ms. Cliff Dai is a very pleasant 66-year-old woman with history of pain and disability in her shoulder.  She had a trial of nonsurgical management including activity modification.  After discussion of risks and benefits of surgical versus nonsurgical management, she elected to proceed with surgical remediation.      DESCRIPTION OF PROCEDURE:  The patient was positively identified in the preanesthesia care area, her surgical site was initialed by me and her consent was reviewed with her.  She was then taken to the operating theater.  She was placed in a well-padded beachchair position, prepped and draped in the usual sterile fashion for left upper extremity surgery.      Prior to surgical initiation a \"timeout\" was held in accordance with hospital policy.  Verbal verification of delivery of prophylactic " "intravenous antibiotics was performed.      After establishment of anterior 12 cm deltopectoral incision, I was able to identify the cephalic vein and allow it to track medially.  Tributaries to the deltoid were ligated and coagulated.      I was able to identify the subdeltoid space and mobilized it bluntly.  I identified the 3 sisters, ligated them twice medially and once laterally.  I opened the bicipital groove, brought the biceps tendon out, opened the rotator interval, amputated off the anterosuperior labrum and tenodesed it to the pectoralis major tendon after releasing the upper 1 cm of the pectoralis.  I was then able to do a vertically oriented #5 FiberWire through the bone tendon interface of the subscapularis and the lesser tuberosity osteotomy and did a circumferential subscapularis release affecting excellent subscapularis \"bounce.\"      I then turned my attention to the humerus.      By externally rotating, I could deliver the humerus into the wound, cut on the osteophyte until I could see the entirety of its denuded articular surface.  I reamed until size 14 mm reamer had acceptable cortical chatter, then osteotomized the head in 30 degrees of retroversion.  I cut a fin, calcar plane, removed the circumferential osteophyte (which was small) and turned my attention to the glenoid.      I identified the center point of the glenoid and did a circumferential labrectomy, placed retractors and then drilled and reamed.  I placed the superior and inferior drill holes, prepared with thrombin soak, followed by hydrogen peroxide soaked pledgets and then pressurized the cement in the standard fashion behind and then impacted the definitive implant and held it behind a gloved thumb until the cement hardened outside the body.      I was then able to do a provisional reduction with the above-mentioned implant trials and found 50% posterior translation, 60 degrees of external rotation at the side with subscapularis " "reapproximated and 70 degrees of internal rotation and 90 degrees of abduction.  I was then able to remove the humeral trial and place sutures around the osteotomy site and 1 vertically through a drill hole lateral to the bicipital groove.  I placed the definitive implant in.  A 14 was too tight and I ultimately downsized to a 13.  This had excellent support and stability.  I impacted the definitive implants, reapproximated and found similar motion profile.  I reapproximated the subscapularis using racking hitch stitches and then dragged the vertically oriented #5 FiberWire through the bone tendon interface with the subscapularis through the drill holes lateral to the bicipital groove, creating a tension band construct.  There was a 5 mm area of the leading edge of the supraspinatus that was avulsed off its insertion on the greater tuberosity and then subsequently I placed this back with a #2 FiberWire through drill holes.      I was then able to identify the axillary nerve using the \"tug test\" to verify it was intact.  I placed a drain proximal to the axillary nerve.  Closure was with Ethibonds proximally and distally to pérez the interval in case of need for later revision followed by 0 Vicryl, 2-0 Vicryl and 3-0 running subcuticular Monocryl.  Steri-Strips and sterile nonadherent dressing were applied.  A sling was placed.      POSTOPERATIVE PLAN:   1.  The patient will be admitted to the Orthopedic Service for intravenous followed by oral pain medications.  She should have supine gravity eliminated forward elevation 0 to unlimited and passive external rotation at the side 0-40 degrees.  Pulleys are okay.   2.  In 2 weeks, she will have the same outpatient motion profile.   3.  At 6 weeks, she will begin gentle progressive 4-quadrant stretching (no internal rotation up the back).   4.  Total sling time will be 6 weeks.   5.  At 3 months, she will have unrestricted 4-quadrant stretching, periscapular stabilization " and strengthening.  Radiographs will also be obtained at that visit.         KARMEN RUCKER MD             D: 2017 15:13   T: 2017 16:11   MT: JOSE      Name:     DESTINY TERRAZAS   MRN:      [id number]        Account:        [id number]   :      1950           Procedure Date: 2017      Document: [id number]

## 2017-02-02 ENCOUNTER — APPOINTMENT (OUTPATIENT)
Dept: OCCUPATIONAL THERAPY | Facility: CLINIC | Age: 67
DRG: 483 | End: 2017-02-02
Attending: ORTHOPAEDIC SURGERY
Payer: MEDICARE

## 2017-02-02 ENCOUNTER — RECORDS - HEALTHEAST (OUTPATIENT)
Dept: ADMINISTRATIVE | Facility: OTHER | Age: 67
End: 2017-02-02

## 2017-02-02 LAB
ANION GAP SERPL CALCULATED.3IONS-SCNC: 7 MMOL/L (ref 3–14)
BUN SERPL-MCNC: 7 MG/DL (ref 7–30)
CALCIUM SERPL-MCNC: 7.9 MG/DL (ref 8.5–10.1)
CHLORIDE SERPL-SCNC: 102 MMOL/L (ref 94–109)
CO2 SERPL-SCNC: 25 MMOL/L (ref 20–32)
CREAT SERPL-MCNC: 0.49 MG/DL (ref 0.52–1.04)
GFR SERPL CREATININE-BSD FRML MDRD: ABNORMAL ML/MIN/1.7M2
GLUCOSE SERPL-MCNC: 110 MG/DL (ref 70–99)
HGB BLD-MCNC: 9.7 G/DL (ref 11.7–15.7)
POTASSIUM SERPL-SCNC: 4.1 MMOL/L (ref 3.4–5.3)
SODIUM SERPL-SCNC: 134 MMOL/L (ref 133–144)

## 2017-02-02 PROCEDURE — 25000132 ZZH RX MED GY IP 250 OP 250 PS 637: Mod: GY | Performed by: ORTHOPAEDIC SURGERY

## 2017-02-02 PROCEDURE — 25800025 ZZH RX 258: Performed by: ORTHOPAEDIC SURGERY

## 2017-02-02 PROCEDURE — 36415 COLL VENOUS BLD VENIPUNCTURE: CPT | Performed by: ORTHOPAEDIC SURGERY

## 2017-02-02 PROCEDURE — 12000001 ZZH R&B MED SURG/OB UMMC

## 2017-02-02 PROCEDURE — 97110 THERAPEUTIC EXERCISES: CPT | Mod: GO | Performed by: OCCUPATIONAL THERAPIST

## 2017-02-02 PROCEDURE — S0077 INJECTION, CLINDAMYCIN PHOSP: HCPCS | Performed by: ORTHOPAEDIC SURGERY

## 2017-02-02 PROCEDURE — A9270 NON-COVERED ITEM OR SERVICE: HCPCS | Mod: GY | Performed by: INTERNAL MEDICINE

## 2017-02-02 PROCEDURE — 25000132 ZZH RX MED GY IP 250 OP 250 PS 637: Mod: GY | Performed by: INTERNAL MEDICINE

## 2017-02-02 PROCEDURE — 99232 SBSQ HOSP IP/OBS MODERATE 35: CPT | Performed by: INTERNAL MEDICINE

## 2017-02-02 PROCEDURE — 80048 BASIC METABOLIC PNL TOTAL CA: CPT | Performed by: ORTHOPAEDIC SURGERY

## 2017-02-02 PROCEDURE — 85018 HEMOGLOBIN: CPT | Performed by: ORTHOPAEDIC SURGERY

## 2017-02-02 PROCEDURE — 99231 SBSQ HOSP IP/OBS SF/LOW 25: CPT | Performed by: NURSE PRACTITIONER

## 2017-02-02 PROCEDURE — 25000125 ZZHC RX 250: Performed by: ORTHOPAEDIC SURGERY

## 2017-02-02 PROCEDURE — 40000133 ZZH STATISTIC OT WARD VISIT: Performed by: OCCUPATIONAL THERAPIST

## 2017-02-02 PROCEDURE — 97535 SELF CARE MNGMENT TRAINING: CPT | Mod: GO | Performed by: OCCUPATIONAL THERAPIST

## 2017-02-02 PROCEDURE — A9270 NON-COVERED ITEM OR SERVICE: HCPCS | Mod: GY | Performed by: ORTHOPAEDIC SURGERY

## 2017-02-02 PROCEDURE — 97165 OT EVAL LOW COMPLEX 30 MIN: CPT | Mod: GO | Performed by: OCCUPATIONAL THERAPIST

## 2017-02-02 PROCEDURE — 97530 THERAPEUTIC ACTIVITIES: CPT | Mod: GO | Performed by: OCCUPATIONAL THERAPIST

## 2017-02-02 RX ORDER — ACETAMINOPHEN 325 MG/1
650 TABLET ORAL EVERY 4 HOURS PRN
Qty: 100 TABLET | Refills: 0 | Status: SHIPPED | OUTPATIENT
Start: 2017-02-04 | End: 2021-06-21

## 2017-02-02 RX ORDER — LORATADINE 10 MG/1
10 TABLET ORAL DAILY
Qty: 30 TABLET | Refills: 0 | Status: SHIPPED | OUTPATIENT
Start: 2017-02-02 | End: 2021-06-21

## 2017-02-02 RX ORDER — OXYCODONE HYDROCHLORIDE 5 MG/1
5-10 TABLET ORAL EVERY 4 HOURS PRN
Qty: 80 TABLET | Refills: 0 | Status: SHIPPED | OUTPATIENT
Start: 2017-02-02 | End: 2017-02-03

## 2017-02-02 RX ADMIN — LORATADINE 10 MG: 10 TABLET ORAL at 09:13

## 2017-02-02 RX ADMIN — OXYCODONE HYDROCHLORIDE 5 MG: 5 TABLET ORAL at 20:45

## 2017-02-02 RX ADMIN — OXYCODONE HYDROCHLORIDE 5 MG: 5 TABLET ORAL at 22:49

## 2017-02-02 RX ADMIN — CLINDAMYCIN PHOSPHATE 900 MG: 18 INJECTION, SOLUTION INTRAVENOUS at 05:21

## 2017-02-02 RX ADMIN — AMOXICILLIN AND CLAVULANATE POTASSIUM 1 TABLET: 250; 125 TABLET, FILM COATED ORAL at 09:14

## 2017-02-02 RX ADMIN — SENNOSIDES AND DOCUSATE SODIUM 2 TABLET: 8.6; 5 TABLET ORAL at 09:13

## 2017-02-02 RX ADMIN — OXYCODONE HYDROCHLORIDE 5 MG: 5 TABLET ORAL at 18:25

## 2017-02-02 RX ADMIN — ACETAMINOPHEN 975 MG: 325 TABLET, FILM COATED ORAL at 11:28

## 2017-02-02 RX ADMIN — OXYCODONE HYDROCHLORIDE 5 MG: 5 TABLET ORAL at 09:13

## 2017-02-02 RX ADMIN — SODIUM CHLORIDE, POTASSIUM CHLORIDE, SODIUM LACTATE AND CALCIUM CHLORIDE: 600; 310; 30; 20 INJECTION, SOLUTION INTRAVENOUS at 05:21

## 2017-02-02 RX ADMIN — CALCIUM CARBONATE (ANTACID) CHEW TAB 500 MG 500 MG: 500 CHEW TAB at 14:16

## 2017-02-02 RX ADMIN — SENNOSIDES AND DOCUSATE SODIUM 2 TABLET: 8.6; 5 TABLET ORAL at 20:45

## 2017-02-02 RX ADMIN — ACETAMINOPHEN 975 MG: 325 TABLET, FILM COATED ORAL at 03:05

## 2017-02-02 RX ADMIN — OXYCODONE HYDROCHLORIDE 5 MG: 5 TABLET ORAL at 05:22

## 2017-02-02 RX ADMIN — OXYCODONE HYDROCHLORIDE 5 MG: 5 TABLET ORAL at 14:16

## 2017-02-02 RX ADMIN — OXYCODONE HYDROCHLORIDE 5 MG: 5 TABLET ORAL at 16:46

## 2017-02-02 RX ADMIN — OXYCODONE HYDROCHLORIDE 5 MG: 5 TABLET ORAL at 03:05

## 2017-02-02 RX ADMIN — ACETAMINOPHEN 975 MG: 325 TABLET, FILM COATED ORAL at 18:25

## 2017-02-02 ASSESSMENT — ACTIVITIES OF DAILY LIVING (ADL)
RETIRED_COMMUNICATION: 0-->UNDERSTANDS/COMMUNICATES WITHOUT DIFFICULTY
RETIRED_EATING: 0-->INDEPENDENT
FALL_HISTORY_WITHIN_LAST_SIX_MONTHS: NO
TOILETING: 0-->INDEPENDENT
TRANSFERRING: 0-->INDEPENDENT
SWALLOWING: 0-->SWALLOWS FOODS/LIQUIDS WITHOUT DIFFICULTY
COGNITION: 0 - NO COGNITION ISSUES REPORTED
BATHING: 0-->INDEPENDENT
AMBULATION: 0-->INDEPENDENT
DRESS: 0-->INDEPENDENT

## 2017-02-02 NOTE — PLAN OF CARE
"Problem: Individualization  Goal: Patient Preferences  07:30-15:30  Left shoulder dressing CDI.  Able to move fingers, thumb is numb and pt not able to give the thumbs up sign.  Pain managed with prn oxycodone 2.5 mg and ice.  Ice packs refreshed through out the shift.  Sling with pillow in place, on at all times except for hygiene and therapy.  Swelling at base of neck subsiding, slight bruising noted.  Facial asymmetry improving.  No difficulty swallowing.  Lungs CTA, denies chest pain/SOB.  Hourly IS use encouraged, goal 2050, able to get to 1500.  Pt did state she feels like she needs to cough something up, again, IS use encouraged.  Oxygen sats have remained above 92 on room air.    BS+, hypoactive, states not passing flatus.  Tolerating full liquid diet.  Did report stomach feeling \"sick\".  Ginger ale and tums given.  Hillman removed this morning @ 09:00, voided x 2 since, last void 450, with PVR of 203.  BP's continue to be soft, BPs in the 90's are normal for pt.  Denied dizziness when up to bathroom at 14:00.            "

## 2017-02-02 NOTE — PLAN OF CARE
Problem: Goal Outcome Summary  Goal: Goal Outcome Summary  No skilled PT needs identified by PT. Will complete orders.

## 2017-02-02 NOTE — PLAN OF CARE
Problem: Perioperative Period (Adult)  Goal: Signs and Symptoms of Listed Potential Problems Will be Absent or Manageable (Perioperative Period)  Signs and symptoms of listed potential problems will be absent or manageable by discharge/transition of care (reference Perioperative Period (Adult) CPG).  Outcome: Improving  See previous provider notification notes. Pt c/o minimal pain and started on 1 oxycodone at a time. Block slowly wearing off but still has N/T in LUE. Weak hand grasp. Radial pulse present. IVFs infusing. Hillman with large u/o. Dangled at bedside for about 20mins. Pneumoboots on. Neck mass marked by previous shift and is now receding from marked line. Pt states headache is gone. Breathing better and is less anxious. Ice on neck all night. Hemovac with some bloody drainage. Call light in reach and able to make needs known.

## 2017-02-02 NOTE — PROGRESS NOTES
Social Service Screening: No Social Service Needs Identified    After review of the electronic health record and discussion with the treatment team,  are not currently indicated for this patient. Anticipate patient will return home. The patient's plan of care and progress towards discharge will be followed by the . Please contact the  if new issues or barriers to discharge arise.       ESTELA Agrawal  [phone]

## 2017-02-02 NOTE — PROVIDER NOTIFICATION
Called Jaz in PACU who was able to give me a different pager for PURNIMA. Paged PURNIMA and Dr. Arriaga called back. Informed him that pt has a strange feeling on left side of face which is causing a headache and that when asked pt to smile, her smile is present but that the left side is not curving up as much and is asymetrical. The mass on shoulder has been outlined by the previous nurse and it appears that it hasn't extended past the line marked. Also that pt states she is exerting more effort in breathing but that she is % on 2L NC and other VSS. Per Dr. Arriaga, the block can numb slightly the diaphragm but that his main concern is to monitor the mass for enlargement. Informed pt and will continue to monitor swollen area.

## 2017-02-02 NOTE — CONSULTS
DATE OF EXAMINATION:  02/01/2017.      ASSESSMENT:   1.  Status post left total shoulder arthroplasty.  Cliff Dai is doing quite well.   2.  History of chronic urinary tract infections treated with Augmentin for suppression as an outpatient.  The patient denies any recent urinary tract infections.   3.  History of Sjogren's syndrome.   4.  History of urticaria treated with Claritin.   5.  Unknown coronary artery status.  The patient has never had a formal coronary artery assessment.   6.  No history of deep venous thrombosis or pulmonary embolism.      RECOMMENDATION:  Routine labs are ordered postoperatively.  The patient will be continued on Claritin.  The patient has been started on Cipro 500 mg twice daily by Orthopedics, I presume for UTI prophylaxis so this will be clarified with Orthopedics.  DVT prophylaxis will be mechanical, per Dr. Gonzalez.      Thank you for having me see this patient.      DISCUSSION:  Cliff Dai is a very pleasant 66-year-old female who underwent a left total shoulder arthroplasty by Dr. Gonzalez today.  I have been asked to see her by Dr. Gonzalez to follow for medical concerns including history of recurrent UTIs and Sjogren's syndrome.      Please see Dr. Gonzalez's notes in the charting for details regarding the patient's orthopedic history and the indications for surgery.      The events of surgery are noted.  Estimated blood loss was 200 mL.  The patient has been hemodynamically stable throughout.      Ms. Dai is seen by me in the recovery room.  She describes good pain control.  She does have mild lower abdominal discomfort with that she does describe mild bladder discomfort which she believes may be from the catheter.  She denies cough, sore throat, abdominal pain, nausea, vomiting.      PAST MEDICAL HISTORY:   1.  Remarkable for chronic shoulder pain secondary to osteoarthritis.   2.  Chronic urticaria, treated with Claritin.   3.  History of recurrent UTIs treated with  suppressive therapy with Augmentin.      The patient denies knowledge of coronary artery disease, though she has never had a formal coronary artery evaluation.      ALLERGIES:  None.      MEDICATIONS:   1.  Prior to admission were Naprosyn 220 mg b.i.d. p.r.n. pain.   2.  Augmentin 250 mg daily.   3.  Claritin 10 mg daily.   4.  Triamcinolone cream daily.      SOCIAL HISTORY:  The patient quit smoking cigarettes in .  She denies significant alcohol use.      PAST SURGICAL HISTORY:  Appendectomy, cholecystectomy, vaginal hysterectomy.      HABITS:  Cephalexin, which causes itching.      REVIEW OF SYSTEMS:  Remarkable for left shoulder pain prior to admission.  She denies fevers, chills, dysuria, abdominal pain, nausea, vomiting.      PHYSICAL EXAMINATION:   GENERAL:  She is a pleasant, well-appearing female who is in no distress.   VITAL SIGNS:  She is normotensive and afebrile.   HEENT:  Pharynx benign.   NECK:  Supple.   LUNGS:  Clear.   CARDIAC:  Regular rate and rhythm without murmurs.   ABDOMEN:  Soft, nontender.   EXTREMITIES:  Left arm is in a sling.  There is no lower extremity edema.  There is no dermatitis present.      LABORATORY DATA:  Preoperatively included a hemoglobin of 12.9.  EKG per report reveals sinus rhythm without acute-appearing changes.  I do not have a basic metabolic panel available.  A BMP will be obtained in the morning.         BJ LOPES MD             D: 2017 17:10   T: 2017 18:10   MT: QEUENIE      Name:     DESTINY TERRAZAS   MRN:      [id number]        Account:       [id number]   :      1950           Consult Date:  2017      Document: [id number]

## 2017-02-02 NOTE — PROGRESS NOTES
Orthopaedic Surgery Progress Note    S: No acute events o/n.  Discussed humeral shaft fracture at distal prosthesis w pt. Fawn will provide pt w/ printout of fx. No change in rehab.     Otherwise cl diet. Pain controlled. Block effective.    Physical Exam  Temp: 97.7  F (36.5  C) Temp src: Oral BP: (!) 87/52 mmHg Pulse: 76 Heart Rate: 72 Resp: 16 SpO2: 100 % O2 Device: Nasal cannula Oxygen Delivery: 2 LPM  Vital Signs with Ranges  Temp:  [95.7  F (35.4  C)-98.5  F (36.9  C)] 97.7  F (36.5  C)  Pulse:  [76] 76  Heart Rate:  [63-85] 72  Resp:  [12-20] 16  BP: ()/(50-91) 87/52 mmHg  SpO2:  [93 %-100 %] 100 % 164 lbs 14.47 oz    Gen: NAD, lying comfortably in bed  CV: RRR  Resp: non labored breathing  LUE:        Sens:  SILT m/u/r/a diminished by block       Motor: faint finger motion       Vasc:  Fingers WWP, palpable rad       Wound: dressings CDI     Drain: 190/40cc last 2 shifts; removed 2/2/2017     HEMOGLOBIN   Date Value Ref Range Status   02/02/2017 9.7* 11.7 - 15.7 g/dL Final   02/01/2017 12.9 11.7 - 15.7 g/dL Final     No results found for: INR    XR: Reviewed pacu postop with nondisplaced humeral shaft periprosthetic fracture.     A/P: 66 year old female with L shoulder degenerative joint disease, s/p shoulder arthroplasty w periprosthetic humerus fracture.     Plan:  Activity:                5# lifting limit until instructed otherwise in clinic. Supine gravity eliminated PROM, codmans, hand/wrist/elbow ROM encouraged.    Drain:     Will d/c POD#1 AM; removed 2/2/2017   Hillman:     Remove POD#1 AM  Antibiotics:        Abx x 24 hours post op for surgical prophylaxis    Diet:       ADAT  Pain:       PO/IV meds. Transition to PO meds only as patient tolerates. No anti-inflammatory medications for six weeks.  DVT ppx:               Ambulation, SCD while inpt  Imaging:               (AP and Grashey) in the recovery room  PT/OT:   ADL, elbow, wrist, finger ROM; Goals PROM 140FF, 40 ER before DC.    DC with  Script for Supine gravity eliminated forward elevation zero to unlimited and passive external  rotation at the side with a stick zero to forty degrees.  2x/week for six weeks.  Pulleys will be included  on the outpatient prescription if they are included on the inpatient orders  Consult:               Appreciate hospitalist assistance with inpt medical management  Dispo:    Pending pain control and PT/OT recs.       Follow up:         2 week post op w/ Dr. Gonzalez w/ humerus XR    Jonh De Los Santos MD  02/02/2017  Pager [phone]

## 2017-02-02 NOTE — PROGRESS NOTES
"[facility]  SUBJECTIVE: Pt reports excellent pain control following L) interscalene nerve block injection for postoperative pain management.  Denies any weakness, paresthesias, circumoral numbness, metallic taste or tinnitus.  Patient is currently without nausea or vomiting.  Pt states she developed a hematoma on L) neck and Dr. Arriaga, Anesthesiologist assessed it overnight.     Clinically Aligned Pain Assessment (CAPA):  Comfort (How is your pain?): Negligible pain  Change in Pain (Since your last medication/intervention?): About the same  Pain Control (How are your pain treatments working?):  Fully effective pain control     Numerical Pain Rating:  Reports pain 0/10 at rest and 0/10 with movement.      OBJECTIVE:    Blood pressure 87/52, pulse 76, temperature 97.7  F (36.5  C), temperature source Oral, resp. rate 16, height 1.638 m (5' 4.5\"), weight 74.8 kg (164 lb 14.5 oz), SpO2 100 %.        ASSESSMENT/PLAN:    Cliff Dai is a 66 year old female POD #1 s/p  ARTHROPLASTY SHOULDER due to end-stage glenohumeral osteoarthritis with single shot L) interscalene nerve block injection, bupivacaine 0.5% with epinephrine 1:200,000 10mL, then liposome bupivacaine (Exparel) 1.3% 10mL given on 2/1/17 for postoperative analgesia.  Pt is ambulating without difficulty, no weakness or paresthesias.  No evidence of adverse side effects associated with L) interscalene nerve block injection. Pt is receiving adequate incisional pain control.  Anticipate up to 72 hours of incisional pain control.  Anticipate patient will require opioid/nonopioid analgesics for visceral and muscle pain not controlled with local anesthetic.  Left arm in immobilizer and can wiggle fingers.      - NO other local anesthetic use within 96 hours of liposome bupivacaine (Exparel)-ex: no lidocaine patches  - patient received verbal and written instructions about liposome bupivacaine  - please call if questions or concerns  - " discussed plan with attending anesthesiologist    KRYS Mota CNP  [facility]  2/2/2017 8:50 AM    24 hour Job Code Pager.  For in-house use only.     Dial * * [phone] and  [place]:  [phone]  [place]: [phone]  Peds:  [phone]  Enter call-back number  May text page using [employer], but NOT [employer].

## 2017-02-02 NOTE — PLAN OF CARE
Problem: Individualization  Goal: Patient Preferences  Low BP reported by OT.  Pt was assisted back to bed, had been up in chair since 09:00. Re-check of BP @ 10:12 97/49. No dizziness, just feeling tired. IV fluids are infusing @ 100cc/hr.   Will notifiy Dr. Charlton.

## 2017-02-02 NOTE — PROGRESS NOTES
02/02/17 0926   Quick Adds   Type of Visit Initial Occupational Therapy Evaluation   Living Environment   Lives With spouse   Living Arrangements house   Home Accessibility stairs to enter home;stairs within home   Number of Stairs to Enter Home 10   Number of Stairs Within Home 10   Stair Railings at Home none   Transportation Available car;family or friend will provide   Living Environment Comment Walk-in shower, grab bars, shower chair   Self-Care   Dominant Hand right   Usual Activity Tolerance good   Current Activity Tolerance moderate   Regular Exercise no   Equipment Currently Used at Home none   Activity/Exercise/Self-Care Comment Patient independent in self-cares/mobility, reports increased pain in L hip impacting mobility/exercise   Functional Level Prior   Ambulation 0-->independent   Transferring 0-->independent   Toileting 0-->independent   Bathing 0-->independent   Dressing 0-->independent   Eating 0-->independent   Communication 0-->understands/communicates without difficulty   Swallowing 0-->swallows foods/liquids without difficulty   Cognition 0 - no cognition issues reported   Fall history within last six months no   Prior Functional Level Comment Patient independent in self-cares/mobility, reports increased pain in L hip impacting mobility/exercise   General Information   Onset of Illness/Injury or Date of Surgery - Date 02/01/17   Referring Physician Dr. Gonzalez   Patient/Family Goals Statement return home to baseline   Additional Occupational Profile Info/Pertinent History of Current Problem POD #1 s/p L shoulder degenerative joint disease, s/p shoulder arthroplasty w periprosthetic humerus fracture   Precautions/Limitations other (see comments)  (TSA protocol)   Weight-Bearing Status - LUE nonweight-bearing   General Observations On RA, alert, up in chair   Cognitive Status Examination   Cognitive Comment No cognitive concerns   Sensory Examination   Sensory Comments N/T in L hand   Pain  Assessment   Patient Currently in Pain No   Range of Motion (ROM)   ROM Comment No ROM of L shoulder   Strength   Strength Comments No MMT provided of L shoulder due to surgery   Mobility   Bed Mobility Bed mobility skill: Sit to supine;Bed mobility skill: Supine to sit   Bed Mobility Skill: Sit to Supine   Level of [place]: Sit/Supine minimum assist (75% patients effort)   Bed Mobility Skill: Supine to Sit   Level of [place]: Supine/Sit minimum assist (75% patients effort)   Transfer Skill: Bed to Chair/Chair to Bed   Level of [place]: Bed to Chair stand-by assist   Transfer Skill: Sit to Stand   Level of [place]: Sit/Stand stand-by assist   Transfer Skill: Toilet Transfer   Level of [place]: Toilet other (see comments)  (not tested)   Bathing   Level of [place] unable to perform   Upper Body Dressing   Level of [place]: Dress Upper Body maximum assist (25% patients effort)   Lower Body Dressing   Level of [place]: Dress Lower Body moderate assist (50% patients effort)   Toileting   Level of [place]: Toilet other (see comments)  (not tested)   Grooming   Level of [place]: Grooming minimum assist (75% patients effort)   Eating/Self Feeding   Level of [place]: Eating independent   Activities of Daily Living Analysis   Impairments Contributing to Impaired Activities of Daily Living post surgical precautions;ROM decreased;sensation decreased;strength decreased   General Therapy Interventions   Planned Therapy Interventions ADL retraining;ROM;home program guidelines   Clinical Impression   Criteria for Skilled Therapeutic Interventions Met yes, treatment indicated   OT Diagnosis impaired self-cares/mobility   Influenced by the following impairments weakness; shoulder precautions   Assessment of Occupational Performance 1-3 Performance Deficits   Identified Performance Deficits dressing, bathing, toileting   Clinical Decision Making (Complexity) Low complexity  "  Therapy Frequency daily   Predicted Duration of Therapy Intervention (days/wks) 2-3 days   Anticipated Discharge Disposition Home with Assist   Risks and Benefits of Treatment have been explained. Yes   Patient, Family & other staff in agreement with plan of care Yes   [facility] TM \"6 Clicks\"   2016, Trustees of [facility], under license to [employer].  All rights reserved.   6 Clicks Short Forms Daily Activity Inpatient Short Form   [facility]  \"6 Clicks\" Daily Activity Inpatient Short Form   1. Putting on and taking off regular lower body clothing? 2 - A Lot   2. Bathing (including washing, rinsing, drying)? 2 - A Lot   3. Toileting, which includes using toilet, bedpan or urinal? 2 - A Lot   4. Putting on and taking off regular upper body clothing? 2 - A Lot   5. Taking care of personal grooming such as brushing teeth? 3 - A Little   6. Eating meals? 4 - None   Daily Activity Raw Score (Score out of 24.Lower scores equate to lower levels of function) 15   Total Evaluation Time   Total Evaluation Time (Minutes) 10     "

## 2017-02-02 NOTE — PROVIDER NOTIFICATION
No response yet from [facility]. Per pt, having to exert a little bit more effort in breathing. Can feel something strange on left side of face and causing a headache. VSS. [facility] paged again at this time.  Awaiting response from second page still.

## 2017-02-02 NOTE — PLAN OF CARE
Problem: Goal Outcome Summary  Goal: Goal Outcome Summary  OT:  Recommend home with spouse.  Patient alert, pleasant, follows complex directions easily.  Patient CGA/ Min A for bed mobility, SBA for transfer bed<>chair.  Patient began education on shoulder precautions, sling management and HEP.  Complete numbness of L UE still present- no pain, slight hand flexion otherwise no ROM of L UE.  Provided with PROM of L hand, wrist, elbow, ER of L shoulder to 40 degrees and FF to 120 in supine.  Patient limited by dizziness, nausea- BP 89/53 in supine, O2 96% on RA.  Unable to begin ambulation at this time.  No inpatient PT needs identified.      P.M. Session: Patient doing slightly better, still having upset stomach.  Able to ambulate 200' with SBA, BP up to 92/50.  Patient still has block, needing PROM to L hand, wrist and elbow- ER to 40 degrees and FF to 140 degrees, beginning to have more pulling/discomfort with exercises.

## 2017-02-02 NOTE — PROGRESS NOTES
PURNIMA Arriaga came to assess patient regarding minimal swelling left neck.MD said to monitor it for now and inform MD if pt complain of difficulty breathing or swelling worsens.

## 2017-02-02 NOTE — PLAN OF CARE
Problem: Goal Outcome Summary  Goal: Goal Outcome Summary  Outcome: Improving  Writer went in to assess patient, reported feeling slight swelling of tongue and some difficulty swallowing and smelled a weird taste and smell like antibiotic. No SOB. Ortho notified, suggested to notify Anaesthetist. Send the Anaesthetist an page. Awaiting for response.

## 2017-02-02 NOTE — PLAN OF CARE
Problem: Goal Outcome Summary  Goal: Goal Outcome Summary  Outcome: Improving  Patient is A&o x 4. Denies pain but numbness in left arm d/t block. At 1800, patient mentioned of some tightness, pain at lower left neck, writer assessed and noticed was swollen, no redness. At 2100 went to assist patient to sit up at EOB, assessed the swelling has increased close to a size of a baseball. Ortho was notified, and suggested to apply pressure with  a cold  towel and notify anaesthetist to assess. Writer talked and explained the appearence of swelling, will come to assess.

## 2017-02-02 NOTE — PROVIDER NOTIFICATION
Paged esdras, Dr. Samson in regards to pt's symptoms of headache, a little more exertion with breathing and uneven facial smile. VSS with lowered temp. Per Dr. Samson, it is most likely the block and he is unable to do anything about it. States to page MDA again.

## 2017-02-02 NOTE — PROGRESS NOTES
Pt feels well overall  Had transient dizziness when up with therapist  No abd pain  BP upper 80s-upper 90s/  HR 70s  02 sats upper 90s    Alert, fully oriented  + hematoma supraclavicular area  Lungs clear  CV rrr  Abd soft  No LE edema      Hb 9.7 (12.9)  BMP nl    Assessment    S/p L TSA, doing well  Acute BL anemia  Hypotension, mild, only sl lower than baseline, likely related to meds, anemia  History of recurrent UTI, on suppressive augmentin    Plan  Repeat Hgb in am   D/c fluids when po intake adequate  Continue augmentin  Mechanical DVT proph

## 2017-02-03 ENCOUNTER — APPOINTMENT (OUTPATIENT)
Dept: OCCUPATIONAL THERAPY | Facility: CLINIC | Age: 67
DRG: 483 | End: 2017-02-03
Attending: ORTHOPAEDIC SURGERY
Payer: MEDICARE

## 2017-02-03 LAB
ANION GAP SERPL CALCULATED.3IONS-SCNC: 5 MMOL/L (ref 3–14)
BUN SERPL-MCNC: 7 MG/DL (ref 7–30)
CALCIUM SERPL-MCNC: 7.9 MG/DL (ref 8.5–10.1)
CHLORIDE SERPL-SCNC: 102 MMOL/L (ref 94–109)
CO2 SERPL-SCNC: 29 MMOL/L (ref 20–32)
CREAT SERPL-MCNC: 0.54 MG/DL (ref 0.52–1.04)
GFR SERPL CREATININE-BSD FRML MDRD: ABNORMAL ML/MIN/1.7M2
GLUCOSE SERPL-MCNC: 103 MG/DL (ref 70–99)
HGB BLD-MCNC: 9.7 G/DL (ref 11.7–15.7)
POTASSIUM SERPL-SCNC: 3.7 MMOL/L (ref 3.4–5.3)
SODIUM SERPL-SCNC: 136 MMOL/L (ref 133–144)

## 2017-02-03 PROCEDURE — 80048 BASIC METABOLIC PNL TOTAL CA: CPT | Performed by: ORTHOPAEDIC SURGERY

## 2017-02-03 PROCEDURE — A9270 NON-COVERED ITEM OR SERVICE: HCPCS | Mod: GY | Performed by: ORTHOPAEDIC SURGERY

## 2017-02-03 PROCEDURE — 12000001 ZZH R&B MED SURG/OB UMMC

## 2017-02-03 PROCEDURE — A9270 NON-COVERED ITEM OR SERVICE: HCPCS | Mod: GY | Performed by: INTERNAL MEDICINE

## 2017-02-03 PROCEDURE — 85018 HEMOGLOBIN: CPT | Performed by: ORTHOPAEDIC SURGERY

## 2017-02-03 PROCEDURE — 25000132 ZZH RX MED GY IP 250 OP 250 PS 637: Mod: GY | Performed by: ORTHOPAEDIC SURGERY

## 2017-02-03 PROCEDURE — 25000132 ZZH RX MED GY IP 250 OP 250 PS 637: Mod: GY | Performed by: INTERNAL MEDICINE

## 2017-02-03 PROCEDURE — 97110 THERAPEUTIC EXERCISES: CPT | Mod: GO | Performed by: OCCUPATIONAL THERAPIST

## 2017-02-03 PROCEDURE — 25000128 H RX IP 250 OP 636: Performed by: ORTHOPAEDIC SURGERY

## 2017-02-03 PROCEDURE — 25000125 ZZHC RX 250: Performed by: ORTHOPAEDIC SURGERY

## 2017-02-03 PROCEDURE — 97535 SELF CARE MNGMENT TRAINING: CPT | Mod: GO | Performed by: OCCUPATIONAL THERAPIST

## 2017-02-03 PROCEDURE — 25000125 ZZHC RX 250

## 2017-02-03 PROCEDURE — 36415 COLL VENOUS BLD VENIPUNCTURE: CPT | Performed by: ORTHOPAEDIC SURGERY

## 2017-02-03 PROCEDURE — 40000133 ZZH STATISTIC OT WARD VISIT: Performed by: OCCUPATIONAL THERAPIST

## 2017-02-03 PROCEDURE — 99232 SBSQ HOSP IP/OBS MODERATE 35: CPT | Performed by: INTERNAL MEDICINE

## 2017-02-03 RX ORDER — HYDROMORPHONE HYDROCHLORIDE 2 MG/1
2-4 TABLET ORAL
Status: DISCONTINUED | OUTPATIENT
Start: 2017-02-03 | End: 2017-02-05 | Stop reason: HOSPADM

## 2017-02-03 RX ORDER — POLYETHYLENE GLYCOL 3350 17 G/17G
17 POWDER, FOR SOLUTION ORAL DAILY
Status: DISCONTINUED | OUTPATIENT
Start: 2017-02-03 | End: 2017-02-05 | Stop reason: HOSPADM

## 2017-02-03 RX ORDER — HYDROMORPHONE HYDROCHLORIDE 2 MG/1
2-4 TABLET ORAL
Qty: 80 TABLET | Refills: 0 | Status: SHIPPED | OUTPATIENT
Start: 2017-02-03 | End: 2017-02-04

## 2017-02-03 RX ADMIN — DEXTROSE AND SODIUM CHLORIDE 1000 ML: 5; 900 INJECTION, SOLUTION INTRAVENOUS at 13:11

## 2017-02-03 RX ADMIN — ACETAMINOPHEN 975 MG: 325 TABLET, FILM COATED ORAL at 12:38

## 2017-02-03 RX ADMIN — HYDROMORPHONE HYDROCHLORIDE 0.5 MG: 10 INJECTION, SOLUTION INTRAMUSCULAR; INTRAVENOUS; SUBCUTANEOUS at 01:37

## 2017-02-03 RX ADMIN — HYDROMORPHONE HYDROCHLORIDE 4 MG: 2 TABLET ORAL at 10:12

## 2017-02-03 RX ADMIN — OXYCODONE HYDROCHLORIDE 10 MG: 5 TABLET ORAL at 03:09

## 2017-02-03 RX ADMIN — AMOXICILLIN AND CLAVULANATE POTASSIUM 1 TABLET: 250; 125 TABLET, FILM COATED ORAL at 08:59

## 2017-02-03 RX ADMIN — ACETAMINOPHEN 975 MG: 325 TABLET, FILM COATED ORAL at 20:20

## 2017-02-03 RX ADMIN — HYDROMORPHONE HYDROCHLORIDE 4 MG: 2 TABLET ORAL at 18:53

## 2017-02-03 RX ADMIN — HYDROMORPHONE HYDROCHLORIDE 4 MG: 2 TABLET ORAL at 22:14

## 2017-02-03 RX ADMIN — SENNOSIDES AND DOCUSATE SODIUM 2 TABLET: 8.6; 5 TABLET ORAL at 20:20

## 2017-02-03 RX ADMIN — ONDANSETRON 4 MG: 4 TABLET, ORALLY DISINTEGRATING ORAL at 13:18

## 2017-02-03 RX ADMIN — OXYCODONE HYDROCHLORIDE 5 MG: 5 TABLET ORAL at 00:10

## 2017-02-03 RX ADMIN — POLYETHYLENE GLYCOL 3350 17 G: 17 POWDER, FOR SOLUTION ORAL at 12:38

## 2017-02-03 RX ADMIN — ONDANSETRON 4 MG: 2 INJECTION INTRAMUSCULAR; INTRAVENOUS at 02:57

## 2017-02-03 RX ADMIN — LORATADINE 10 MG: 10 TABLET ORAL at 08:59

## 2017-02-03 RX ADMIN — HYDROMORPHONE HYDROCHLORIDE 4 MG: 2 TABLET ORAL at 15:26

## 2017-02-03 RX ADMIN — PROCHLORPERAZINE EDISYLATE 5 MG: 5 INJECTION INTRAMUSCULAR; INTRAVENOUS at 18:24

## 2017-02-03 RX ADMIN — ACETAMINOPHEN 975 MG: 325 TABLET, FILM COATED ORAL at 02:46

## 2017-02-03 RX ADMIN — OXYCODONE HYDROCHLORIDE 10 MG: 5 TABLET ORAL at 07:02

## 2017-02-03 RX ADMIN — SALINE NASAL SPRAY 1 SPRAY: 1.5 SOLUTION NASAL at 12:37

## 2017-02-03 RX ADMIN — SENNOSIDES AND DOCUSATE SODIUM 2 TABLET: 8.6; 5 TABLET ORAL at 08:59

## 2017-02-03 RX ADMIN — HYDROMORPHONE HYDROCHLORIDE 0.3 MG: 10 INJECTION, SOLUTION INTRAMUSCULAR; INTRAVENOUS; SUBCUTANEOUS at 13:10

## 2017-02-03 NOTE — PLAN OF CARE
Problem: Goal Outcome Summary  Goal: Goal Outcome Summary  OT:  Patient not feeling well, nursing present.  Patient pale, decreased alertness/fatigue. Spouse had come in for 1:00 session- patient unable to participate but trained caregiver on HEP and UB dressing one handed.  Spouse will come in again tomorrow at 11:30 for further caregiver training.  Also would benefit from Home OT/PT as slow progress with therapy at this point.

## 2017-02-03 NOTE — DISCHARGE SUMMARY
ORTHOPAEDIC DISCHARGE SUMMARY     Date of Admission: 2/1/2017  Date of Discharge: 2/5/2017  Disposition: Home  Staff Physician: Lui Gonzalez*  Primary Care Provider: Hyun Mcgovern    DISCHARGE DIAGNOSIS:      Glenohumeral Osteoarthritis    S/p L TSA    L periprosthetic humerus fracture    PROCEDURES: Procedure(s):  ARTHROPLASTY SHOULDER left on 2/1/2017    BRIEF HISTORY:  Ms. Cliff Dai is a very pleasant 66-year-old woman with history of pain and disability in her shoulder.  She had a trial of nonsurgical management including activity modification.  After discussion of risks and benefits of surgical versus nonsurgical management, she elected to proceed with surgical remediation.      IMPLANTS:    1.  Vicente Bigliani/Flatow Complete Shoulder Solution size 13 x 130 mm press-fit humeral stem.    2.  Vicente Bigliani/Flatow Complete Shoulder Solution size 21 x 46 mm offset modular humeral head (maximum at 10:00).    3.  Vicente Bigliani/Flatow Complete Shoulder Solution size 46 mm pegged glenoid component.    4.  Simplex cement preloaded with antibiotics.      HOSPITAL COURSE:    Surgery was complicated by intraoperative humerus fracture. Cliff Dai has done well post-operatively. Medicine was consulted post operatively to aid in management of medical comorbidities. See final recommendations below. The patient received routine nursing cares and is medically stable. Vital signs are stable. The patient is tolerating a regular diet without GI distress/nausea or vomiting. Voiding spontaneously. All PT/OT goals have been met for safe mobility. Pain is now controlled on oral medications which will be available on discharge. Stool softeners have been used while taking pain medications to help prevent constipation. Cliff Dai is deemed medically safe to discharge.     Antibiotics:  Clindamycin given periop and 24 hours postop.   DVT prophylaxis:  ambulation  PT Progress:   Has met PT/OT goals  "for safe mobility.    Inpatient Events: No significant events postop    Discharge orders and instructions as below.    PHYSICAL EXAMINATION:  Vital Signs: /55 mmHg  Pulse 76  Temp(Src) 96.7  F (35.9  C) (Oral)  Resp 16  Ht 1.638 m (5' 4.5\")  Wt 74.8 kg (164 lb 14.5 oz)  BMI 27.88 kg/m2  SpO2 90%  General: No acute distress    Pulmonary:  Nonlabored  Cardiovascular:  Intact distal pulses, capillary refill <3s  Musculoskeletal:    LUE:        Sens:  SILT m/u/r/a       Motor: Can give thumbs up, A-OK sign, and cross fingers.  strength 5/5.       Vasc:  Fingers WWP, palpable radial pulse.       Wound: dressings CDI .    FOLLOWUP:      Call to schedule for a wound check in 2 weeks with Dr. Gonzalez    [facility] orthopedic [facility] [phone]  [place] [phone]  Email: [email or web address]    Call the clinic if you have not heard about your follow up appointments within 7 days.       PLANNED DISCHARGE ORDERS:     DVT Prophylaxis: mechanical    Plan per Dr. Gonzalez  POSTOPERATIVE PLAN:    1.  supine gravity eliminated forward elevation 0 to unlimited and passive external rotation at the side 0-40 degrees.  Pulleys are okay.    2.  In 2 weeks, she will have the same outpatient motion profile.    3.  At 6 weeks, she will begin gentle progressive 4-quadrant stretching (no internal rotation up the back).    4.  Total sling time will be 6 weeks.    5.  At 3 months, she will have unrestricted 4-quadrant stretching, periscapular stabilization and strengthening.  Radiographs will also be obtained at that visit.          Current Discharge Medication List      START taking these medications    Details   oxyCODONE (ROXICODONE) 5 MG IR tablet Take 1-2 tablets (5-10 mg) by mouth every 4 hours as needed for moderate to severe pain  Qty: 80 tablet, Refills: 0    Associated Diagnoses: Presence of artificial shoulder joint, right      loratadine (CLARITIN) 10 MG tablet Take 1 tablet (10 mg) by mouth daily  Qty: 30 " tablet, Refills: 0    Associated Diagnoses: Presence of artificial shoulder joint, right         CONTINUE these medications which have CHANGED    Details   acetaminophen (TYLENOL) 325 MG tablet Take 2 tablets (650 mg) by mouth every 4 hours as needed for other (surgical pain)  Qty: 100 tablet, Refills: 0    Associated Diagnoses: Presence of artificial shoulder joint, right         CONTINUE these medications which have NOT CHANGED    Details   ciprofloxacin (CIPRO) 500 MG tablet Take 1 tablet (500 mg) by mouth 2 times daily  Qty: 14 tablet, Refills: 0    Associated Diagnoses: Urinary frequency      triamcinolone (KENALOG) 0.1 % cream Apply topically 4 times daily         STOP taking these medications       loratadine (CLARITIN) 10 MG capsule Comments:   Reason for Stopping:         Naproxen Sodium (ALEVE PO) Comments:   Reason for Stopping:                 Discharge Procedure Orders  Reason for your hospital stay   Order Comments: You had shoulder surgery     Follow Up and recommended labs and tests   Order Comments: Follow up with Dr Gonzalez at [facility] in two weeks. Call the office at [phone] to schedule an appointment if you have not already done so.     Activity   Order Comments: Your activity upon discharge: as tolerated, sling at all times. You may remove it for hygiene and dressing.   Order Specific Question Answer Comments   Is discharge order? Yes      When to contact your care team   Order Comments: Call your physician for fevers greater than 101.5, chills, increased pain, redness, swelling or discharge at the surgical site. During regular business hours call Dr Mota's office and request to speak with his nurse or the triage nurses. If you see him at [facility] call [phone]. If you see him at [facility] [place] call [phone]. After hours or on weekends call the hospital  at [phone] and ask to speak with the resident on call.     Wound care and dressings   Order Comments:  Instructions to care for your wound at home:you have a brown dressing on that can remain in place for 5-7 days. You may shower over this dressing.  At the end of that time, remove the dressing and leave wound open to air if it is dry. You may shower with your dressing off if it is clean and dry.         Jonh De Los Santos MD  02/02/2017  Pager [phone]      Carlos Barker MD, PhD  Orthopaedic Surgery PGY1  [phone]

## 2017-02-03 NOTE — PROGRESS NOTES
Care Coordinator- Discharge Planning     Admission Date/Time:  2/1/2017  Attending MD:  Lui Gonzalez*     Data  Date of initial CC assessment:  2/3/2017  Chart reviewed, discussed with interdisciplinary team.   Patient was admitted for:   1. Presence of artificial shoulder joint, right         Assessment  Concerns with insurance coverage for discharge needs: None.  Current Living Situation: Patient lives with spouse.  Support System: Supportive and Involved  Services Involved: Home Care  Transportation: Family or Friend will provide  Barriers to Discharge: Recent surgical procedure      Coordination of Care and Referrals: Provided patient/family with options for Home Care. Met with patient and her  at bedside to discuss discharge planning. Patient feeling weak and sleepy today. Unable to discuss or make a decision regarding a home care agency for skilled nursing, PT and OT. This writer gave the patient and her  a list of home care agencies in their area. They will look over the list and pick an agency over the weekend. If patient dc's over the weekend she will need home care set up for skilled nursing, PT and OT. If patient does not dc over the weekend this writer will see patient on Monday to discuss home care. No further discharge concerns at this time. CC to follow as needed.      Plan  Anticipated Discharge Date:  TBD  Anticipated Discharge Plan:  Home with Home Care    CTS Handoff completed:  YES    Sheela Padron RN, BSN  Care Coordinator, 8A  Phone [phone]  Pager [phone]

## 2017-02-03 NOTE — PLAN OF CARE
Problem: Individualization  Goal: Patient Preferences  Pt A/O X 4. Afebrile. VSS. Lungs- clear bilaterally with both anterior and posterior. IS encouraged. Bowels- active in all four quadrants. PP+ DP+. CMS and Neuro's are intact. Denies numbness and tingling in all extremities. Denies nausea, shortness of breath, and chest pain. Has pain in the left shoulder and given oxycodone, ICE applied, and is tolerating well.  Voids spontaneously without difficulty in the BR.  Is on a regular diet and appetite was good this shift. Dressing is CDI. Pt up independently. PIV is patent and SL. PCD's on BLE's. Bilateral heels are elevated off the bed. Pt is able to make needs known and the call light is within the pt's reach. Continue to monitor.

## 2017-02-03 NOTE — PLAN OF CARE
Problem: Goal Outcome Summary  Goal: Goal Outcome Summary  OT:  Recommend home with spouse, may need home OT.  Spouse coming in for 1:00 session for caregiver education.  Very limited session due to dizziness, nausea, groggy/difficulty with directions.  AROM of L hand, wrist and AAROM of L elbow, PROM in supine of ER to 30 degrees and FF to 90 degrees.  Patient declined OOB activity due to not feeling well.  Do not anticipate to meet goals today.

## 2017-02-03 NOTE — PLAN OF CARE
Problem: Individualization  Goal: Patient Preferences  07:30-15:30  Left shoulder dressing CDI, CMS intact.  2+ radial pulse.  Sling with pillow on at all times.    Pain still not adequately managed.   Oxycodone changed to PO dilaudid.  Oxycodone given by previous shift @ 702 am, with no relief. PO dilaudid given @ 10:12 & 15:26.    IV dilaudid. 0.3 mg given @ 13:10.  Pt reporting @ 15:26 that pain is starting to get a bit better.    Lungs CTA, denies chest pain/SOB.  BS+ all quadrants, + flatus, no BM this shift.  Pt requested miralax, order obtained, pt received this afternoon.  Nauseated, Zofran, ODT given, emesis of 100 cc bile colored liquid with specks of undigested food.  Pt stated she felt better after emesis.  IV D5. 0.9 NaCl starated @ 100 cc/hour @ 13:11.  Pt received accupuncture this afternoon.

## 2017-02-03 NOTE — PROVIDER NOTIFICATION
Pt requesting miralax for constipation and saline nasal spray for dry nostrils.  Verbal order obtained from Dr. Charlton, and orders placed.

## 2017-02-03 NOTE — PROGRESS NOTES
S:   Pt doing well.  Pain well controlled.    O:   Sets deltoid.         EPL, FPL, FA, I, G + motor.         Sensation grossly intact to light touch in Axillary,            Musculocutaneous, Median, Radial, and            Ulnar nerve distributions.         Dressing clean, dry and intact.       A:   Doing well         P:   ** D/C planning when discharge criteria met                   (140 degrees of passive forward elevation, 40             degrees of external rotation at the side                       passively -- Pulleys are ok).        ** Appreciate Medicine assistance with medical            comorbidities.        ** PO Analgesia today.        ** Sling at all times when not doing exercises.   ** No NSAIDs.

## 2017-02-03 NOTE — PLAN OF CARE
Problem: Goal Outcome Summary  Goal: Goal Outcome Summary  Outcome: Improving  A/O x4. VSS, afebrile, occasional soft BPs. LS clear, denies SOB. O2 2L applied - desatted to 87% RA, upper 90s now. Denies CP, denies feeling lightheaded/dizzy. Grasp becoming stronger in left hand. Radial pulse 2+ bilaterally. Denies numbness or tingling. Wearing sling/immobilizer to left arm at all times. Using pillows to help position arm. Surgical drsg CDI to left shoulder. Did not have pain relief from prn oxycodone initially - IV dilaudid given once which gave good relief however, did drop BP to 80s/40's (after 20 min BP kori to 106/57). Also had short period of nausea - zofran IV given once, has denied nausea since with no emesis. Urinated about 200 ml @ 0200  with a bladder scan of 535 ml. Writer offered to straight cath but pt requested to try to void independently one more time. Around 0300 voided 400 ml, bladder scanned for 193 - did not end up completing straight cath. Pt is up independently with help applying/removing PCDs. No BM but states passing gas - BS active x4. Calls appropriately and makes needs known.

## 2017-02-03 NOTE — PROGRESS NOTES
Acupuncture Clinical Internship Intake and Treatment Documentation   [facility]    Date:  2/3/2017  Patient Name:  Cliff Dai   YOB: 1950     Repeat Patient:  no  Has patient had acupoint/acupressure treatment before:  yes    Signed consent placed in the medical record:  yes  Patient/Family verbalizes understanding of risks and benefits:  yes  Required information provided to patient:  yes    Diagnosis:  Glenohumeral Osteoarthritis  Presence of artificial shoulder joint on 2/1/17    Patient condition and treatment: L shoulder replacement  Reason for Intervention Today/Chief Complaint: Pain in L shoulder, pain in low back, pain in L hip    Isolation:  No  Type:  None    PRE-SCORE:  moderate    Other Western medical information: patient has a hx of low back and neck pain    Medications  Current Facility-Administered Medications   Medication     HYDROmorphone (DILAUDID) tablet 2-4 mg     polyethylene glycol (MIRALAX/GLYCOLAX) Packet 17 g     sodium chloride (OCEAN) 0.65 % nasal spray 1 spray     dextrose 5% and 0.9% NaCl infusion     loratadine (CLARITIN) tablet 10 mg     lidocaine 1 % 1 mL     lidocaine (LMX4) kit     sodium chloride (PF) 0.9% PF flush 3 mL     sodium chloride (PF) 0.9% PF flush 3 mL     naloxone (NARCAN) injection 0.1-0.4 mg     calcium carbonate (TUMS) chewable tablet 500-1,000 mg     HYDROmorphone (PF) (DILAUDID) injection 0.3-0.5 mg     acetaminophen (TYLENOL) tablet 975 mg     [START ON 2/4/2017] acetaminophen (TYLENOL) tablet 650 mg     metoclopramide (REGLAN) tablet 5 mg    Or     metoclopramide (REGLAN) injection 5 mg     prochlorperazine (COMPAZINE) injection 5 mg    Or     prochlorperazine (COMPAZINE) tablet 5 mg     ondansetron (ZOFRAN-ODT) ODT tab 4 mg    Or     ondansetron (ZOFRAN) injection 4 mg     senna-docusate (SENOKOT-S;PERICOLACE) 8.6-50 MG per tablet 1-2 tablet     melatonin tablet 1 mg     bupivacaine liposome (EXPAREL) LONG ACTING  "injection was administered into the infiltration site to produce postsurgical analgesia. Duration of action is up to 72 hours, and other \"nick\" medications should not be given for 96 hours with the exception of the lidocaine 5% patch (LIDODERM) and the lidocaine 10mg in potassium infusions. This entry is for INFORMATION ONLY.     amoxicillin-clavulanate (AUGMENTIN) 250-125 MG per tablet 1 tablet     hypromellose-dextran (ARTIFICAL TEARS) ophthalmic solution 1 drop     benzocaine-menthol (CHLORASEPTIC) 6-10 MG lozenge 1 lozenge     Current Outpatient Prescriptions   Medication Sig Dispense Refill     oxyCODONE (ROXICODONE) 5 MG IR tablet Take 1-2 tablets (5-10 mg) by mouth every 4 hours as needed for moderate to severe pain 80 tablet 0     [START ON 2/4/2017] acetaminophen (TYLENOL) 325 MG tablet Take 2 tablets (650 mg) by mouth every 4 hours as needed for other (surgical pain) 100 tablet 0     loratadine (CLARITIN) 10 MG tablet Take 1 tablet (10 mg) by mouth daily 30 tablet 0       Pre-Treatment Assessment  Chief Complaint/ Reason for Intervention Today:  pain  Chief Complaint Pre-Score:  moderate   Describe:  Pain in low back, L shoulder and L hip, pain is constant and \"annoying\", it is hard for her to get comfortable   Pain Location:  Low back, L shoulder and hip   Pre Session Pain:  Moderate  Pre Session Anxiety:  None  Pre Session Nausea:  Mild, patient as vomited today    10 Traditional Chinese Medicine Assessment Questions  - Cold/ Heat: neutral  - Sweat: none  - Headaches/Body aches: had a HA today, dull  - Chest/Abdomen:    - Digestion: no too good, nauseous, patient has a glucose IV, dry mouth hx  - Bowel Movement/Urination: has not had a BM since surgery, urination: no pain burning, is having some retained urine  - Hearing/Vision:  none  - Sleep (prior to hospital):  Not sleeping well, disrupted  - Energy:  low  - Emotions:  irritability  - Ob Gyn: complete hysterectomy, 13 years ago, uterine " prolapse      Traditional Chinese Medicine Assessment  - TONGUE: pale, lavender, moist, thin white coat  - PULSE:  88 bpm, full, strong, R mich position full (pulse only felt on R side due to shoulder repair on left and sling on that arm)  - OBSERVATIONS: patient appeared slightly irritable and was covering her face with her hand and closing her eyes during the intake, her answers were short and concise     Traditional Chinese Medicine Diagnosis  - BRANCH:  Qi stagnation causing pain, stomach qi rebellious  - ROOT: Kidney Essence Deficency    Traditional Chinese Medicine Treatment  - ACUPUNCTURE:  [place]: Right ear: Omega2, Rios Men, Cingulate gyrus, Left: point zero, thalamus, St36  - NEEDLE COUNT: In: 7   Out: 7   Time In: 2:55      Post Treatment Assessment  Chief complaint post score:  better  Post Session Observation:  Patient stated she felt sleepy during insertion, she rested peacefully during treatment for 20 minutes, after needles were removed patient's eyes appeared brighter and she appeared more alert  Patient/Family Education:  none  Verbal information provided:  no  Written information provided:  no  All questions answered at time of treatment:  yes    Treatment/Procedure(s) performed by: Ana Anguiano    Date: 2/3/2017     *Attestation goes here*

## 2017-02-04 ENCOUNTER — APPOINTMENT (OUTPATIENT)
Dept: OCCUPATIONAL THERAPY | Facility: CLINIC | Age: 67
DRG: 483 | End: 2017-02-04
Attending: ORTHOPAEDIC SURGERY
Payer: MEDICARE

## 2017-02-04 LAB
ANION GAP SERPL CALCULATED.3IONS-SCNC: 5 MMOL/L (ref 3–14)
BUN SERPL-MCNC: 4 MG/DL (ref 7–30)
C DIFF TOX B STL QL: NORMAL
CALCIUM SERPL-MCNC: 8.2 MG/DL (ref 8.5–10.1)
CHLORIDE SERPL-SCNC: 104 MMOL/L (ref 94–109)
CO2 SERPL-SCNC: 29 MMOL/L (ref 20–32)
CREAT SERPL-MCNC: 0.45 MG/DL (ref 0.52–1.04)
GFR SERPL CREATININE-BSD FRML MDRD: ABNORMAL ML/MIN/1.7M2
GLUCOSE SERPL-MCNC: 112 MG/DL (ref 70–99)
POTASSIUM SERPL-SCNC: 3.5 MMOL/L (ref 3.4–5.3)
SODIUM SERPL-SCNC: 138 MMOL/L (ref 133–144)
SPECIMEN SOURCE: NORMAL

## 2017-02-04 PROCEDURE — 97110 THERAPEUTIC EXERCISES: CPT | Mod: GO | Performed by: OCCUPATIONAL THERAPIST

## 2017-02-04 PROCEDURE — 40000133 ZZH STATISTIC OT WARD VISIT: Performed by: OCCUPATIONAL THERAPIST

## 2017-02-04 PROCEDURE — 12000001 ZZH R&B MED SURG/OB UMMC

## 2017-02-04 PROCEDURE — 25000132 ZZH RX MED GY IP 250 OP 250 PS 637: Mod: GY | Performed by: ORTHOPAEDIC SURGERY

## 2017-02-04 PROCEDURE — 87493 C DIFF AMPLIFIED PROBE: CPT | Performed by: INTERNAL MEDICINE

## 2017-02-04 PROCEDURE — A9270 NON-COVERED ITEM OR SERVICE: HCPCS | Mod: GY | Performed by: ORTHOPAEDIC SURGERY

## 2017-02-04 PROCEDURE — 80048 BASIC METABOLIC PNL TOTAL CA: CPT | Performed by: INTERNAL MEDICINE

## 2017-02-04 PROCEDURE — 36415 COLL VENOUS BLD VENIPUNCTURE: CPT | Performed by: INTERNAL MEDICINE

## 2017-02-04 PROCEDURE — 25000132 ZZH RX MED GY IP 250 OP 250 PS 637: Mod: GY | Performed by: INTERNAL MEDICINE

## 2017-02-04 PROCEDURE — 99231 SBSQ HOSP IP/OBS SF/LOW 25: CPT | Performed by: INTERNAL MEDICINE

## 2017-02-04 PROCEDURE — 25000125 ZZHC RX 250: Performed by: INTERNAL MEDICINE

## 2017-02-04 PROCEDURE — A9270 NON-COVERED ITEM OR SERVICE: HCPCS | Mod: GY | Performed by: INTERNAL MEDICINE

## 2017-02-04 PROCEDURE — 97535 SELF CARE MNGMENT TRAINING: CPT | Mod: GO | Performed by: OCCUPATIONAL THERAPIST

## 2017-02-04 RX ORDER — HYDROMORPHONE HYDROCHLORIDE 2 MG/1
2-4 TABLET ORAL
Qty: 60 TABLET | Refills: 0 | Status: SHIPPED | OUTPATIENT
Start: 2017-02-04 | End: 2021-06-21

## 2017-02-04 RX ADMIN — HYDROMORPHONE HYDROCHLORIDE 2 MG: 2 TABLET ORAL at 20:29

## 2017-02-04 RX ADMIN — LORATADINE 10 MG: 10 TABLET ORAL at 08:05

## 2017-02-04 RX ADMIN — ACETAMINOPHEN 650 MG: 325 TABLET, FILM COATED ORAL at 20:29

## 2017-02-04 RX ADMIN — HYDROMORPHONE HYDROCHLORIDE 2 MG: 2 TABLET ORAL at 12:23

## 2017-02-04 RX ADMIN — HYDROMORPHONE HYDROCHLORIDE 4 MG: 2 TABLET ORAL at 05:54

## 2017-02-04 RX ADMIN — HYDROMORPHONE HYDROCHLORIDE 2 MG: 2 TABLET ORAL at 15:05

## 2017-02-04 RX ADMIN — HYDROMORPHONE HYDROCHLORIDE 4 MG: 2 TABLET ORAL at 02:31

## 2017-02-04 RX ADMIN — ACETAMINOPHEN 975 MG: 325 TABLET, FILM COATED ORAL at 12:23

## 2017-02-04 RX ADMIN — HYDROMORPHONE HYDROCHLORIDE 2 MG: 2 TABLET ORAL at 18:10

## 2017-02-04 RX ADMIN — SENNOSIDES AND DOCUSATE SODIUM 1 TABLET: 8.6; 5 TABLET ORAL at 08:04

## 2017-02-04 RX ADMIN — AMOXICILLIN AND CLAVULANATE POTASSIUM 1 TABLET: 250; 125 TABLET, FILM COATED ORAL at 08:05

## 2017-02-04 RX ADMIN — POLYETHYLENE GLYCOL 3350 17 G: 17 POWDER, FOR SOLUTION ORAL at 08:04

## 2017-02-04 RX ADMIN — DEXTROSE AND SODIUM CHLORIDE: 5; 900 INJECTION, SOLUTION INTRAVENOUS at 00:42

## 2017-02-04 RX ADMIN — ACETAMINOPHEN 975 MG: 325 TABLET, FILM COATED ORAL at 04:11

## 2017-02-04 RX ADMIN — HYDROMORPHONE HYDROCHLORIDE 4 MG: 2 TABLET ORAL at 09:03

## 2017-02-04 NOTE — PROGRESS NOTES
Orthopaedic Surgery Progress Note    S: No acute events overnight.  Pain better controlled this AM. Skipped last OT session yesterday due to pain, will repeat today. No change in rehab. Otherwise no cp/sob/n/v, tolerating diet, +BM, voiding spontaneously.    Physical Exam  Temp: 97.9  F (36.6  C) Temp src: Oral BP: 107/57 mmHg Pulse: 82 Heart Rate: 83 Resp: 16 SpO2: 93 % O2 Device: None (Room air)    Vital Signs with Ranges  Temp:  [97.1  F (36.2  C)-98.1  F (36.7  C)] 97.9  F (36.6  C)  Pulse:  [82] 82  Heart Rate:  [78-86] 83  Resp:  [16] 16  BP: ()/(57-63) 107/57 mmHg  SpO2:  [88 %-96 %] 93 % 164 lbs 14.47 oz    Gen: NAD, lying comfortably in bed  CV: RRR  Resp: non labored breathing  LUE:        Sens:  SILT m/u/r/a diminished by block       Motor: Can give thumbs up, A-OK sign, and cross fingers.  strength 5/5.       Vasc:  Fingers WWP, palpable radial pulse.       Wound: dressings CDI .    Drain: Removed 2/2/2017     HEMOGLOBIN   Date Value Ref Range Status   02/03/2017 9.7* 11.7 - 15.7 g/dL Final   02/02/2017 9.7* 11.7 - 15.7 g/dL Final     No results found for: INR    XR: Reviewed pacu postop with nondisplaced humeral shaft periprosthetic fracture.     A/P: 66 year old female with L shoulder degenerative joint disease, s/p shoulder arthroplasty w periprosthetic humerus fracture.     Plan:  Activity:                5# lifting limit until instructed otherwise in clinic. Supine gravity eliminated PROM, codmans, hand/wrist/elbow ROM encouraged.    Drain:     Will d/c POD#1 AM; removed 2/2/2017   [place]:     Remove POD#1 AM  Antibiotics:        Abx x 24 hours post op for surgical prophylaxis    Diet:       ADAT  Pain:       PO/IV meds. Transition to PO meds only as patient tolerates. No anti-inflammatory medications for six weeks.  DVT ppx:               Ambulation, SCD while inpt  Imaging:               (AP and Grashey) in the recovery room  PT/OT:   ADL, elbow, wrist, finger ROM; Goals PROM 140FF, 40  ER before DC.    DC with Script for Supine gravity eliminated forward elevation zero to unlimited and passive external  rotation at the side with a stick zero to forty degrees.  2x/week for six weeks.  Pulleys will be included  on the outpatient prescription if they are included on the inpatient orders  Consult:               Appreciate hospitalist assistance with inpt medical management  Dispo:    Pending pain control and PT/OT recs.  Anticipate d/c home today or tomorrow.     Follow up:         2 week post op w/ Dr. Gonzalez w/ humerus XR    Carlos Barker MD  02/04/2017  Pager [phone]

## 2017-02-04 NOTE — PROGRESS NOTES
Pt feels much better  No further nausea or dizziness    VSS  Alert, fully oriented, appears comfortable  Lungs clear  CV rrr  abd soft  No LE edema      Assessment    S/p L TSA, poor pain control this am,    Nausea, emesis, dizziness, mild hypotension, likely secondary to medications, resolved  Acute BL anemia, stable  History of recurrent UTI, on suppressive augmentin    Plan  D/c today  Orders completed.

## 2017-02-04 NOTE — PLAN OF CARE
Problem: Goal Outcome Summary  Goal: Goal Outcome Summary  Outcome: Therapy, progress towards functional goals is fair  OT: In am, patient able to achieve 40 degrees ER at side with assist from therapist. Only able to achieve approx 90 degrees FE with pulleys. In the pm, able to achieve 130 degrees FE with PROM supine/reclined in bed. Progressing with ROM slowly; needs repeated cues and education at times. Able to follow AROM program for elbow, wrist, hand fairly well in the pm. Spouse present for teaching both sessions today and plans to be here at 11 am tomorrow for last session. Anticipate discharge home tomorrow with spouse, home PT/OT.

## 2017-02-04 NOTE — PLAN OF CARE
Problem: Goal Outcome Summary  Goal: Goal Outcome Summary  Outcome: Improving  Patient A/Ox4. VSS. Denies CP, SOB, dizziness/LH. LSCTA. +fl/BS. Voiding with out difficulty CMS intact. Dressing to shoulder  CDI, ice applied. Tolerating regular diet without NV. IS encouraged.  IV SL Pain rated comfortably manageable throughout shift, managed with dilaudid po PRN. Patient has demonstrated ability to call appropriately. Patient is resting with call light within reach. Will continue to monitor.

## 2017-02-04 NOTE — PLAN OF CARE
Problem: Individualization  Goal: Patient Preferences  Pt A/O X 4. Afebrile. VSS with soft bps. Lungs- clear bilaterally with both anterior and posterior. IS encouraged. Bowels- active in all four quadrants, pt had 1 large BM this shift. PP+ DP+. CMS and Neuro's are intact. Denies numbness and tingling in all extremities. Denies nausea, shortness of breath, and chest pain. Has pain in the left shoulder and given dilaudid, ICE applied, and is tolerating well. Voids spontaneously without difficulty in the BR. Is on a regular diet and appetite was fair this shift, pt had 1 episode of emesis following dinner and given compazine with relief. Dressing is CDI. Pt up standby. PIV is patent in the and SL. PCD's on BLE's. Bilateral heels are elevated off the bed. Pt is able to make needs known and the call light is within the pt's reach. Continue to monitor.

## 2017-02-04 NOTE — PLAN OF CARE
Problem: Individualization  Goal: Patient Preferences  Pt A&O x's 4. VSS. Afebrile. 02 sats in low 90s on RA. Lungs clear. Denies SOB, chest pain or nausea. Tolerating regular diet. Bowel sound active in all quadrants. No BM but passing gas. Voiding adequately in the bathroom. Pain well managed with prn and scheduled tylenol.  CMS and neuro checks are  intact, denies N/T. Left shoulder dressing clean,dry and intact. PIV patent and running per order. Pt slept between care and is able to make needs known, call light with in reach. Will continue to monitor.

## 2017-02-05 ENCOUNTER — RECORDS - HEALTHEAST (OUTPATIENT)
Dept: ADMINISTRATIVE | Facility: OTHER | Age: 67
End: 2017-02-05

## 2017-02-05 ENCOUNTER — APPOINTMENT (OUTPATIENT)
Dept: OCCUPATIONAL THERAPY | Facility: CLINIC | Age: 67
DRG: 483 | End: 2017-02-05
Attending: ORTHOPAEDIC SURGERY
Payer: MEDICARE

## 2017-02-05 VITALS
TEMPERATURE: 96.8 F | SYSTOLIC BLOOD PRESSURE: 107 MMHG | HEIGHT: 65 IN | RESPIRATION RATE: 16 BRPM | WEIGHT: 164.9 LBS | OXYGEN SATURATION: 96 % | HEART RATE: 96 BPM | DIASTOLIC BLOOD PRESSURE: 64 MMHG | BODY MASS INDEX: 27.47 KG/M2

## 2017-02-05 PROCEDURE — 25000132 ZZH RX MED GY IP 250 OP 250 PS 637: Mod: GY | Performed by: INTERNAL MEDICINE

## 2017-02-05 PROCEDURE — 25000132 ZZH RX MED GY IP 250 OP 250 PS 637: Mod: GY | Performed by: ORTHOPAEDIC SURGERY

## 2017-02-05 PROCEDURE — A9270 NON-COVERED ITEM OR SERVICE: HCPCS | Mod: GY | Performed by: INTERNAL MEDICINE

## 2017-02-05 PROCEDURE — A9270 NON-COVERED ITEM OR SERVICE: HCPCS | Mod: GY | Performed by: ORTHOPAEDIC SURGERY

## 2017-02-05 PROCEDURE — 40000133 ZZH STATISTIC OT WARD VISIT: Performed by: OCCUPATIONAL THERAPIST

## 2017-02-05 PROCEDURE — 97110 THERAPEUTIC EXERCISES: CPT | Mod: GO | Performed by: OCCUPATIONAL THERAPIST

## 2017-02-05 PROCEDURE — 97535 SELF CARE MNGMENT TRAINING: CPT | Mod: GO | Performed by: OCCUPATIONAL THERAPIST

## 2017-02-05 RX ADMIN — AMOXICILLIN AND CLAVULANATE POTASSIUM 1 TABLET: 250; 125 TABLET, FILM COATED ORAL at 08:03

## 2017-02-05 RX ADMIN — HYDROMORPHONE HYDROCHLORIDE 2 MG: 2 TABLET ORAL at 12:58

## 2017-02-05 RX ADMIN — HYDROMORPHONE HYDROCHLORIDE 4 MG: 2 TABLET ORAL at 00:04

## 2017-02-05 RX ADMIN — ACETAMINOPHEN 650 MG: 325 TABLET, FILM COATED ORAL at 03:35

## 2017-02-05 RX ADMIN — HYDROMORPHONE HYDROCHLORIDE 2 MG: 2 TABLET ORAL at 08:03

## 2017-02-05 RX ADMIN — HYDROMORPHONE HYDROCHLORIDE 4 MG: 2 TABLET ORAL at 03:34

## 2017-02-05 RX ADMIN — HYDROMORPHONE HYDROCHLORIDE 2 MG: 2 TABLET ORAL at 10:18

## 2017-02-05 RX ADMIN — LORATADINE 10 MG: 10 TABLET ORAL at 08:03

## 2017-02-05 NOTE — PLAN OF CARE
Problem: Goal Outcome Summary  Goal: Goal Outcome Summary  Outcome: Adequate for Discharge Date Met:  02/05/17  Patient A/Ox4. VSS. Denies CP, SOB, dizziness/LH. LSCTA. +fl/BS. Voiding adequately in BR. CMS intact. Dressing to L shoulder CDI, ice applied, sling maintained. Tolerating regular diet without NV. IS encouraged. Up ind in room. IV DCd. Pain rated as tolerable throughout shift, managed with 2mg PO dilaudid q3h. Patient was cleared for DC to home. Pt and  were given medication and DC instructions and state no further questions at this time. Pt was DC'd via transport with A from ,

## 2017-02-05 NOTE — PLAN OF CARE
Problem: Goal Outcome Summary  Goal: Goal Outcome Summary  OT:  Recommend home with spouse and home OT/PT.  Patient was able to obtain PROM goals with pulleys and cane but needs encouragement and cues.  Overall dressing, sling management and shower task with SBA/Min A.   Has all needed AE.    Occupational Therapy Discharge Summary    Reason for therapy discharge:    All goals and outcomes met, no further needs identified.    Progress towards therapy goal(s). [facility] for goal details.  Goals met    Therapy recommendation(s):    Continue home exercise program.  Home OT for continued independence in ADLs-sling management.  Home PT for continued independence and progress with L UE HEP, endurance.

## 2017-02-05 NOTE — PROGRESS NOTES
Orthopaedic Surgery Progress Note    S: C/o headache overnight and was given Tylenol at 3:35AM.  Pain controlled this AM.  Last session with OT this AM. No change in rehab. Otherwise no cp/sob/n/v, tolerating diet, +BM, voiding spontaneously.    Physical Exam  Temp: 97  F (36.1  C) Temp src: Oral BP: 115/69 mmHg Pulse: 96 Heart Rate: 94 Resp: 17 SpO2: 95 % O2 Device: None (Room air)    Vital Signs with Ranges  Temp:  [96.2  F (35.7  C)-97.8  F (36.6  C)] 97  F (36.1  C)  Pulse:  [86-96] 96  Heart Rate:  [94] 94  Resp:  [17-18] 17  BP: ()/(55-69) 115/69 mmHg  SpO2:  [93 %-95 %] 95 % 164 lbs 14.47 oz    Gen: NAD, lying comfortably in bed  CV: RRR  Resp: non labored breathing  LUE:        Sens:  SILT m/u/r/a       Motor: Can give thumbs up, A-OK sign, and cross fingers.  strength 5/5.       Vasc:  Fingers WWP, palpable radial pulse.       Wound: dressings CDI .    Drain: Removed 2/2/2017     HEMOGLOBIN   Date Value Ref Range Status   02/03/2017 9.7* 11.7 - 15.7 g/dL Final   02/02/2017 9.7* 11.7 - 15.7 g/dL Final     No results found for: INR    XR: Reviewed pacu postop with nondisplaced humeral shaft periprosthetic fracture.     A/P: 66 year old female with L shoulder degenerative joint disease, s/p shoulder arthroplasty w periprosthetic humerus fracture.     Plan:  Activity:                5# lifting limit until instructed otherwise in clinic. Supine gravity eliminated PROM, codmans, hand/wrist/elbow ROM encouraged.    Drain:     Will d/c POD#1 AM; removed 2/2/2017   [place]man:     Remove POD#1 AM  Antibiotics:        Abx x 24 hours post op for surgical prophylaxis    Diet:       ADAT  Pain:       PO/IV meds. Transition to PO meds only as patient tolerates. No anti-inflammatory medications for six weeks.  DVT ppx:               Ambulation, SCD while inpt  Imaging:               (AP and Grashey) in the recovery room  PT/OT:   ADL, elbow, wrist, finger ROM; Goals PROM 140FF, 40 ER before DC.    DC with Script for  Supine gravity eliminated forward elevation zero to unlimited and passive external  rotation at the side with a stick zero to forty degrees.  2x/week for six weeks.  Pulleys will be included  on the outpatient prescription if they are included on the inpatient orders  Consult:               Appreciate hospitalist assistance with inpt medical management  Dispo:    Pending pain control and PT/OT recs.  Anticipate d/c home today.     Follow up:         2 week post op w/ Dr. Gonzalez w/ humerus XR    Carlos Barker MD  02/05/2017  Pager [phone]

## 2017-02-05 NOTE — PROGRESS NOTES
"Transition Planning Update    D:  Received a page from the 8A RN staff stating that  would be discharging today and she reported that the O.T. Rehab Team staff member, Ms Abiola Worthington stated that patient needed to be set up for home PT and OT.  Upon Chart Review, this writer phoned the nursing staff back secondary to questioning the home bound status of patient.  This writer then spoke with Abiola via phone to verify home bound status on the behalf of patient and Abiola stated that patient and her spouse had been taught the post op exercise plans and she felt that patient would benefit from additional education reinforcement in home setting. This writer confirmed that at this time patient would not be considered home bound at this time of discharge and per Abiola she would phone [facility] back to update the RN team about patient following up on an outpatient basis in her home area prn.  A/P:  Inpatient cares continue per MD team plans of care through discharge today.  Per report from the Saturday February 4, 2017 weekend RN Care Coordinator, she received the same request yesterday and determined that patient was not considered home bound. Inpatient Care Coordinator staff is available to assist with updated dc needs prn.     The following outpatient therapy plan of care was also documented in the MD note:   \"DC with Script for Supine gravity eliminated forward elevation zero to unlimited and passive external         rotation at the side with a stick zero to forty degrees.  2x/week for six weeks.  Pulleys will be included      on the outpatient prescription if they are included on the inpatient orders\"    MARY Pradhan.S.N., P.H.N.,R.N.         Pager   "

## 2017-02-05 NOTE — PLAN OF CARE
Problem: Individualization  Goal: Patient Preferences  Pt A/O X 4. Afebrile. VSS. Lungs- clear bilaterally with both anterior and posterior. IS encouraged. Bowels- active in all four quadrants, pt had several episodes of watery diarrhea, moonlighter notified and cdiff test performed with negative results. PP+ DP+. CMS and Neuro's are intact. Denies numbness and tingling in all extremities. Denies nausea, shortness of breath, and chest pain. Has pain in the left shoulder and given dilaudid, ICE applied, and is tolerating well. Voids spontaneously without difficulty in the BR. Is on a regular diet and appetite was fair this shift. Dressing is CDI. Pt up independently. PIV is patent in the and SL. PCD's on BLE's. Bilateral heels are elevated off the bed. Pt is able to make needs known and the call light is within the pt's reach. Continue to monitor.

## 2017-02-05 NOTE — PLAN OF CARE
Problem: Individualization  Goal: Patient Preferences  Pt A&O x's 4. VSS. Afebrile. 02 sats in low to mid  90s on RA. Lungs clear. Denies SOB, chest pain or nausea. Tolerating regular diet. Bowel sound active in all quadrants. No BM during the shift. Pt states that her last loose stool was last night at 1900. passing gas.  Drinking and voiding adequately in the bathroom. Pain well managed with prn dilaudd and prn tylenol. however pt complains of headache, thinks that it might be caused by pain med. Will update ortho resident this morning. CMS and neuro checks are  intact, denies N/T. Pt declined pcd. Pt is up independently.  Left shoulder dressing clean,dry and intact. PIV patent and  SL. Pt slept between care and is able to make needs known, call light with in reach. Will continue to monitor.

## 2017-03-07 ENCOUNTER — OFFICE VISIT - HEALTHEAST (OUTPATIENT)
Dept: FAMILY MEDICINE | Facility: CLINIC | Age: 67
End: 2017-03-07

## 2017-03-07 DIAGNOSIS — M25.552 PELVIC JOINT PAIN, LEFT: ICD-10-CM

## 2017-03-07 DIAGNOSIS — M25.552 LEFT HIP PAIN: ICD-10-CM

## 2017-03-14 ENCOUNTER — HOSPITAL ENCOUNTER (OUTPATIENT)
Dept: MRI IMAGING | Facility: HOSPITAL | Age: 67
Discharge: HOME | End: 2017-03-14
Attending: FAMILY MEDICINE

## 2017-03-14 DIAGNOSIS — M25.552 LEFT HIP PAIN: ICD-10-CM

## 2017-03-14 DIAGNOSIS — M25.552 PELVIC JOINT PAIN, LEFT: ICD-10-CM

## 2017-03-17 ENCOUNTER — COMMUNICATION - HEALTHEAST (OUTPATIENT)
Dept: FAMILY MEDICINE | Facility: CLINIC | Age: 67
End: 2017-03-17

## 2017-03-19 ENCOUNTER — COMMUNICATION - HEALTHEAST (OUTPATIENT)
Dept: FAMILY MEDICINE | Facility: CLINIC | Age: 67
End: 2017-03-19

## 2017-04-04 ENCOUNTER — HOSPITAL ENCOUNTER (OUTPATIENT)
Dept: MAMMOGRAPHY | Facility: HOSPITAL | Age: 67
Discharge: HOME | End: 2017-04-04
Attending: FAMILY MEDICINE

## 2017-04-04 DIAGNOSIS — Z12.31 VISIT FOR SCREENING MAMMOGRAM: ICD-10-CM

## 2017-07-11 ENCOUNTER — OFFICE VISIT - HEALTHEAST (OUTPATIENT)
Dept: FAMILY MEDICINE | Facility: CLINIC | Age: 67
End: 2017-07-11

## 2017-07-11 DIAGNOSIS — M81.0 OSTEOPOROSIS: ICD-10-CM

## 2017-07-11 DIAGNOSIS — Z00.00 HEALTHCARE MAINTENANCE: ICD-10-CM

## 2017-07-11 DIAGNOSIS — Z78.0 POST-MENOPAUSAL: ICD-10-CM

## 2017-07-11 DIAGNOSIS — G62.9 PERIPHERAL NEUROPATHY: ICD-10-CM

## 2017-07-11 DIAGNOSIS — Z00.00 ROUTINE GENERAL MEDICAL EXAMINATION AT A HEALTH CARE FACILITY: ICD-10-CM

## 2017-07-11 ASSESSMENT — MIFFLIN-ST. JEOR: SCORE: 1231.98

## 2017-07-15 ENCOUNTER — AMBULATORY - HEALTHEAST (OUTPATIENT)
Dept: FAMILY MEDICINE | Facility: CLINIC | Age: 67
End: 2017-07-15

## 2017-07-15 DIAGNOSIS — E66.3 OVERWEIGHT: ICD-10-CM

## 2017-07-15 DIAGNOSIS — R63.1 POLYDIPSIA: ICD-10-CM

## 2017-07-15 DIAGNOSIS — M35.00 SICCA SYNDROME (H): ICD-10-CM

## 2017-07-15 DIAGNOSIS — N95.1 SYMPTOMATIC MENOPAUSAL OR FEMALE CLIMACTERIC STATES: ICD-10-CM

## 2017-07-15 DIAGNOSIS — Z86.39 PERSONAL HISTORY OF OTHER ENDOCRINE, NUTRITIONAL AND METABOLIC DISEASE: ICD-10-CM

## 2017-07-15 DIAGNOSIS — M81.0 OSTEOPOROSIS: ICD-10-CM

## 2017-07-15 DIAGNOSIS — G62.9 PERIPHERAL NEUROPATHY: ICD-10-CM

## 2017-07-15 DIAGNOSIS — R60.9 EDEMA: ICD-10-CM

## 2017-08-09 ENCOUNTER — AMBULATORY - HEALTHEAST (OUTPATIENT)
Dept: LAB | Facility: CLINIC | Age: 67
End: 2017-08-09

## 2017-08-09 DIAGNOSIS — E66.3 OVERWEIGHT: ICD-10-CM

## 2017-08-09 DIAGNOSIS — M35.00 SICCA SYNDROME (H): ICD-10-CM

## 2017-08-09 DIAGNOSIS — M81.0 OSTEOPOROSIS: ICD-10-CM

## 2017-08-09 DIAGNOSIS — R60.9 EDEMA: ICD-10-CM

## 2017-08-09 DIAGNOSIS — R63.1 POLYDIPSIA: ICD-10-CM

## 2017-08-09 DIAGNOSIS — G62.9 PERIPHERAL NEUROPATHY: ICD-10-CM

## 2017-08-09 DIAGNOSIS — Z86.39 PERSONAL HISTORY OF OTHER ENDOCRINE, NUTRITIONAL AND METABOLIC DISEASE: ICD-10-CM

## 2017-08-09 LAB
CHOLEST SERPL-MCNC: 205 MG/DL
FASTING STATUS PATIENT QL REPORTED: YES
HBA1C MFR BLD: 5.8 % (ref 3.5–6)
HDLC SERPL-MCNC: 51 MG/DL
LDLC SERPL CALC-MCNC: 136 MG/DL
TRIGL SERPL-MCNC: 88 MG/DL

## 2017-08-11 ENCOUNTER — COMMUNICATION - HEALTHEAST (OUTPATIENT)
Dept: FAMILY MEDICINE | Facility: CLINIC | Age: 67
End: 2017-08-11

## 2017-08-14 ENCOUNTER — AMBULATORY - HEALTHEAST (OUTPATIENT)
Dept: NURSING | Facility: CLINIC | Age: 67
End: 2017-08-14

## 2017-08-14 DIAGNOSIS — Z00.00 PHYSICAL EXAM, ROUTINE: ICD-10-CM

## 2017-08-16 ENCOUNTER — HOSPITAL ENCOUNTER (OUTPATIENT)
Dept: BONE DENSITY | Facility: HOSPITAL | Age: 67
Discharge: HOME | End: 2017-08-16
Attending: FAMILY MEDICINE

## 2017-08-16 DIAGNOSIS — Z78.0 POST-MENOPAUSAL: ICD-10-CM

## 2017-08-28 ENCOUNTER — RECORDS - HEALTHEAST (OUTPATIENT)
Dept: ADMINISTRATIVE | Facility: OTHER | Age: 67
End: 2017-08-28

## 2017-08-30 ENCOUNTER — COMMUNICATION - HEALTHEAST (OUTPATIENT)
Dept: FAMILY MEDICINE | Facility: CLINIC | Age: 67
End: 2017-08-30

## 2017-08-30 DIAGNOSIS — N39.0 UTI (URINARY TRACT INFECTION): ICD-10-CM

## 2017-09-12 ENCOUNTER — OFFICE VISIT - HEALTHEAST (OUTPATIENT)
Dept: FAMILY MEDICINE | Facility: CLINIC | Age: 67
End: 2017-09-12

## 2017-09-12 ENCOUNTER — AMBULATORY - HEALTHEAST (OUTPATIENT)
Dept: FAMILY MEDICINE | Facility: CLINIC | Age: 67
End: 2017-09-12

## 2017-09-12 DIAGNOSIS — Z23 NEEDS FLU SHOT: ICD-10-CM

## 2017-09-12 DIAGNOSIS — R30.0 DYSURIA: ICD-10-CM

## 2017-09-12 DIAGNOSIS — R06.9 RESPIRATORY ABNORMALITY: ICD-10-CM

## 2017-12-19 ENCOUNTER — OFFICE VISIT - HEALTHEAST (OUTPATIENT)
Dept: FAMILY MEDICINE | Facility: CLINIC | Age: 67
End: 2017-12-19

## 2017-12-19 DIAGNOSIS — R30.0 DYSURIA: ICD-10-CM

## 2018-04-12 ENCOUNTER — HOSPITAL ENCOUNTER (OUTPATIENT)
Dept: MAMMOGRAPHY | Facility: CLINIC | Age: 68
Discharge: HOME | End: 2018-04-12
Attending: FAMILY MEDICINE

## 2018-04-12 DIAGNOSIS — Z12.31 VISIT FOR SCREENING MAMMOGRAM: ICD-10-CM

## 2018-05-21 ENCOUNTER — OFFICE VISIT - HEALTHEAST (OUTPATIENT)
Dept: FAMILY MEDICINE | Facility: CLINIC | Age: 68
End: 2018-05-21

## 2018-05-21 DIAGNOSIS — M79.604 BILATERAL LEG PAIN: ICD-10-CM

## 2018-05-21 DIAGNOSIS — M79.605 BILATERAL LEG PAIN: ICD-10-CM

## 2018-05-29 ENCOUNTER — RECORDS - HEALTHEAST (OUTPATIENT)
Dept: ADMINISTRATIVE | Facility: OTHER | Age: 68
End: 2018-05-29

## 2018-06-20 ENCOUNTER — RECORDS - HEALTHEAST (OUTPATIENT)
Dept: ADMINISTRATIVE | Facility: OTHER | Age: 68
End: 2018-06-20

## 2018-06-26 ENCOUNTER — TRANSFERRED RECORDS (OUTPATIENT)
Dept: HEALTH INFORMATION MANAGEMENT | Facility: CLINIC | Age: 68
End: 2018-06-26

## 2018-07-17 ENCOUNTER — OFFICE VISIT - HEALTHEAST (OUTPATIENT)
Dept: FAMILY MEDICINE | Facility: CLINIC | Age: 68
End: 2018-07-17

## 2018-07-17 DIAGNOSIS — E55.9 VITAMIN D DEFICIENCY: ICD-10-CM

## 2018-07-17 DIAGNOSIS — G62.9 PERIPHERAL NEUROPATHY: ICD-10-CM

## 2018-07-17 DIAGNOSIS — Z00.00 ROUTINE GENERAL MEDICAL EXAMINATION AT A HEALTH CARE FACILITY: ICD-10-CM

## 2018-07-17 DIAGNOSIS — N39.0 RECURRENT UTI: ICD-10-CM

## 2018-07-17 DIAGNOSIS — L30.9 DERMATITIS: ICD-10-CM

## 2018-07-17 LAB
ALBUMIN SERPL-MCNC: 4.1 G/DL (ref 3.5–5)
ALP SERPL-CCNC: 67 U/L (ref 45–120)
ALT SERPL W P-5'-P-CCNC: 15 U/L (ref 0–45)
ANION GAP SERPL CALCULATED.3IONS-SCNC: 9 MMOL/L (ref 5–18)
AST SERPL W P-5'-P-CCNC: 18 U/L (ref 0–40)
BASOPHILS # BLD AUTO: 0 THOU/UL (ref 0–0.2)
BASOPHILS NFR BLD AUTO: 1 % (ref 0–2)
BILIRUB SERPL-MCNC: 0.5 MG/DL (ref 0–1)
BUN SERPL-MCNC: 17 MG/DL (ref 8–22)
CALCIUM SERPL-MCNC: 9.6 MG/DL (ref 8.5–10.5)
CHLORIDE BLD-SCNC: 106 MMOL/L (ref 98–107)
CHOLEST SERPL-MCNC: 212 MG/DL
CO2 SERPL-SCNC: 25 MMOL/L (ref 22–31)
CREAT SERPL-MCNC: 0.64 MG/DL (ref 0.6–1.1)
EOSINOPHIL # BLD AUTO: 0.5 THOU/UL (ref 0–0.4)
EOSINOPHIL NFR BLD AUTO: 9 % (ref 0–6)
ERYTHROCYTE [DISTWIDTH] IN BLOOD BY AUTOMATED COUNT: 12.2 % (ref 11–14.5)
FASTING STATUS PATIENT QL REPORTED: YES
GFR SERPL CREATININE-BSD FRML MDRD: >60 ML/MIN/1.73M2
GLUCOSE BLD-MCNC: 98 MG/DL (ref 70–125)
HCT VFR BLD AUTO: 40.4 % (ref 35–47)
HDLC SERPL-MCNC: 57 MG/DL
HGB BLD-MCNC: 13.3 G/DL (ref 12–16)
LDLC SERPL CALC-MCNC: 141 MG/DL
LYMPHOCYTES # BLD AUTO: 1.6 THOU/UL (ref 0.8–4.4)
LYMPHOCYTES NFR BLD AUTO: 29 % (ref 20–40)
MCH RBC QN AUTO: 32.7 PG (ref 27–34)
MCHC RBC AUTO-ENTMCNC: 32.9 G/DL (ref 32–36)
MCV RBC AUTO: 99 FL (ref 80–100)
MONOCYTES # BLD AUTO: 0.5 THOU/UL (ref 0–0.9)
MONOCYTES NFR BLD AUTO: 8 % (ref 2–10)
NEUTROPHILS # BLD AUTO: 3 THOU/UL (ref 2–7.7)
NEUTROPHILS NFR BLD AUTO: 53 % (ref 50–70)
PLATELET # BLD AUTO: 278 THOU/UL (ref 140–440)
PMV BLD AUTO: 8.1 FL (ref 7–10)
POTASSIUM BLD-SCNC: 4.5 MMOL/L (ref 3.5–5)
PROT SERPL-MCNC: 6.7 G/DL (ref 6–8)
RBC # BLD AUTO: 4.07 MILL/UL (ref 3.8–5.4)
SODIUM SERPL-SCNC: 140 MMOL/L (ref 136–145)
TRIGL SERPL-MCNC: 71 MG/DL
TSH SERPL DL<=0.005 MIU/L-ACNC: 1.67 UIU/ML (ref 0.3–5)
WBC: 5.6 THOU/UL (ref 4–11)

## 2018-07-17 ASSESSMENT — MIFFLIN-ST. JEOR: SCORE: 1239.75

## 2018-07-18 LAB
25(OH)D3 SERPL-MCNC: 53.4 NG/ML (ref 30–80)
25(OH)D3 SERPL-MCNC: 53.4 NG/ML (ref 30–80)

## 2018-07-22 ENCOUNTER — COMMUNICATION - HEALTHEAST (OUTPATIENT)
Dept: FAMILY MEDICINE | Facility: CLINIC | Age: 68
End: 2018-07-22

## 2018-07-23 ENCOUNTER — AMBULATORY - HEALTHEAST (OUTPATIENT)
Dept: FAMILY MEDICINE | Facility: CLINIC | Age: 68
End: 2018-07-23

## 2018-07-23 DIAGNOSIS — L30.9 DERMATITIS: ICD-10-CM

## 2018-07-24 ENCOUNTER — COMMUNICATION - HEALTHEAST (OUTPATIENT)
Dept: FAMILY MEDICINE | Facility: CLINIC | Age: 68
End: 2018-07-24

## 2018-07-25 ENCOUNTER — AMBULATORY - HEALTHEAST (OUTPATIENT)
Dept: FAMILY MEDICINE | Facility: CLINIC | Age: 68
End: 2018-07-25

## 2018-07-25 DIAGNOSIS — L30.9 DERMATITIS: ICD-10-CM

## 2018-09-07 ENCOUNTER — RECORDS - HEALTHEAST (OUTPATIENT)
Dept: ADMINISTRATIVE | Facility: OTHER | Age: 68
End: 2018-09-07

## 2018-11-01 ENCOUNTER — AMBULATORY - HEALTHEAST (OUTPATIENT)
Dept: NURSING | Facility: CLINIC | Age: 68
End: 2018-11-01

## 2018-11-01 DIAGNOSIS — Z23 FLU VACCINE NEED: ICD-10-CM

## 2018-11-05 ENCOUNTER — COMMUNICATION - HEALTHEAST (OUTPATIENT)
Dept: FAMILY MEDICINE | Facility: CLINIC | Age: 68
End: 2018-11-05

## 2018-11-05 ENCOUNTER — AMBULATORY - HEALTHEAST (OUTPATIENT)
Dept: FAMILY MEDICINE | Facility: CLINIC | Age: 68
End: 2018-11-05

## 2018-11-05 DIAGNOSIS — Z23 NEED FOR SHINGLES VACCINE: ICD-10-CM

## 2018-11-07 ENCOUNTER — AMBULATORY - HEALTHEAST (OUTPATIENT)
Dept: NURSING | Facility: CLINIC | Age: 68
End: 2018-11-07

## 2018-11-07 DIAGNOSIS — Z23 NEED FOR SHINGLES VACCINE: ICD-10-CM

## 2019-01-08 ENCOUNTER — PATIENT OUTREACH (OUTPATIENT)
Dept: CARE COORDINATION | Facility: CLINIC | Age: 69
End: 2019-01-08

## 2019-01-10 NOTE — TELEPHONE ENCOUNTER
[facility]   DATE RECEIVED: 1/11/19   NOTES STATUS DETAILS   OFFICE NOTE from referring provider Care Everywhere    OFFICE NOTE from other specialist Care Everywhere    DISCHARGE SUMMARY from hospital Care Everywhere    DISCHARGE REPORT from the ER N/A    OPERATIVE REPORT Care Everywhere 2/1/17   MEDICATION LIST Care Everywhere    IMPLANT RECORD/STICKER Care Everywhere    LABS     CBC/DIFF N/A    CULTURES N/A    INJECTIONS DONE IN RADIOLOGY N/A    MRI PACS 1/10/17   CT SCAN N/A    XRAYS (IMAGES & REPORTS) PACS 2/6/18+   TUMOR     PATHOLOGY  Slides & report N/A

## 2019-01-11 ASSESSMENT — ENCOUNTER SYMPTOMS
RECTAL PAIN: 0
NAUSEA: 0
SKIN CHANGES: 1
VOMITING: 0
BACK PAIN: 1
DIARRHEA: 1
MYALGIAS: 0
BOWEL INCONTINENCE: 0
STIFFNESS: 1
MUSCLE CRAMPS: 0
ARTHRALGIAS: 0
JOINT SWELLING: 0
DIFFICULTY URINATING: 1
NAIL CHANGES: 0
POOR WOUND HEALING: 0
NECK PAIN: 0
JAUNDICE: 0
HEMATURIA: 0
MUSCLE WEAKNESS: 1
HEARTBURN: 0
BLOOD IN STOOL: 0
ABDOMINAL PAIN: 0
DYSURIA: 0
BLOATING: 0
CONSTIPATION: 0
FLANK PAIN: 0

## 2019-01-14 ENCOUNTER — ANCILLARY PROCEDURE (OUTPATIENT)
Dept: GENERAL RADIOLOGY | Facility: CLINIC | Age: 69
End: 2019-01-14
Payer: COMMERCIAL

## 2019-01-14 ENCOUNTER — RECORDS - HEALTHEAST (OUTPATIENT)
Dept: ADMINISTRATIVE | Facility: OTHER | Age: 69
End: 2019-01-14

## 2019-01-14 ENCOUNTER — OFFICE VISIT (OUTPATIENT)
Dept: ORTHOPEDICS | Facility: CLINIC | Age: 69
End: 2019-01-14
Payer: COMMERCIAL

## 2019-01-14 ENCOUNTER — PRE VISIT (OUTPATIENT)
Dept: ORTHOPEDICS | Facility: CLINIC | Age: 69
End: 2019-01-14

## 2019-01-14 VITALS — WEIGHT: 159 LBS | HEIGHT: 65 IN | BODY MASS INDEX: 26.49 KG/M2

## 2019-01-14 DIAGNOSIS — M25.512 CHRONIC PAIN OF BOTH SHOULDERS: Primary | ICD-10-CM

## 2019-01-14 DIAGNOSIS — G89.29 CHRONIC PAIN OF BOTH SHOULDERS: Primary | ICD-10-CM

## 2019-01-14 DIAGNOSIS — M25.512 CHRONIC PAIN OF BOTH SHOULDERS: ICD-10-CM

## 2019-01-14 DIAGNOSIS — M25.511 CHRONIC PAIN OF BOTH SHOULDERS: ICD-10-CM

## 2019-01-14 DIAGNOSIS — M25.511 CHRONIC PAIN OF BOTH SHOULDERS: Primary | ICD-10-CM

## 2019-01-14 DIAGNOSIS — G89.29 CHRONIC PAIN OF BOTH SHOULDERS: ICD-10-CM

## 2019-01-14 DIAGNOSIS — Z96.611 STATUS POST REPLACEMENT OF RIGHT SHOULDER JOINT: Primary | ICD-10-CM

## 2019-01-14 RX ORDER — AMOXICILLIN AND CLAVULANATE POTASSIUM 500; 125 MG/1; MG/1
TABLET, FILM COATED ORAL
COMMUNITY
Start: 2017-03-30 | End: 2022-04-12

## 2019-01-14 ASSESSMENT — MIFFLIN-ST. JEOR: SCORE: 1234.16

## 2019-01-14 NOTE — PROGRESS NOTES
CHIEF COMPLAINT:  Left shoulder status post total shoulder arthroplasty, right shoulder pain.      HISTORY OF PRESENT ILLNESS:  Ms. Dai returns today for followup.  I have known her from care at [facility].  This is transferring her care to this health care system at her request.      She notes she cannot do planks because of weakness in her left shoulder and she cannot sleep on that side.  She is doing her home exercises 3-5 times a week.  She notes that her range of motion and strength have progressed some, but she has also had worsening hip and back pain which has limited her ability to do some of the activities about which she is passionate.      PHYSICAL EXAMINATION:  On exam today, she has 160 degrees of forward elevation bilaterally, 160 degrees of bilateral lateral elevation, 60 degrees of bilateral external rotation, internal rotation of the back to T11 bilaterally.  She has a normal liftoff on both sides.  She has 5/5 forward elevator and external rotator strength on the right and 4/5 forward elevator strength on the left with 5/5 external rotator strength.      RADIOGRAPHS:  AP and lateral of the glenohumeral joint, AP and lateral of the scapula bilateral shoulders show a left shoulder total shoulder arthroplasty in good position without any evidence for radiographic lucency and right shoulder mild glenohumeral arthritis.      ASSESSMENT:     1.  Left shoulder status post above procedure, doing well.   2.  Right early glenohumeral arthritis.      PLAN:  I had a lengthy talk with Ms. Dai today.  We spent 15 minutes of face-to-face time, 12 of which was spent on coordination of care and counseling.  I told her that I thought she was doing well and she should continue to do her home exercises as she has been doing.  We discussed dental prophylaxis and she agreed to see me back at the 5-year pérez postoperatively with repeat radiographs or sooner should any additional problems arise.       Answers for HPI/ROS  submitted by the patient on 1/11/2019   General Symptoms: No  Skin Symptoms: Yes  HENT Symptoms: No  EYE SYMPTOMS: No  HEART SYMPTOMS: No  LUNG SYMPTOMS: No  INTESTINAL SYMPTOMS: Yes  URINARY SYMPTOMS: Yes  GYNECOLOGIC SYMPTOMS: No  BREAST SYMPTOMS: No  SKELETAL SYMPTOMS: Yes  BLOOD SYMPTOMS: No  NERVOUS SYSTEM SYMPTOMS: No  MENTAL HEALTH SYMPTOMS: No  Changes in hair: No  Changes in moles/birth marks: Yes  Itching: No  Rashes: No  Changes in nails: No  Acne: No  Hair in places you don't want it: No  Change in facial hair: No  Warts: No  Non-healing sores: No  Scarring: No  Flaking of skin: No  Color changes of hands/feet in cold : No  Sun sensitivity: No  Skin thickening: No  Heart burn or indigestion: No  Nausea: No  Vomiting: No  Abdominal pain: No  Bloating: No  Constipation: No  Diarrhea: Yes  Blood in stool: No  Black stools: No  Rectal or Anal pain: No  Fecal incontinence: No  Yellowing of skin or eyes: No  Vomit with blood: No  Change in stools: No  Trouble holding urine or incontinence: Yes  Pain or burning: No  Trouble starting or stopping: Yes  Increased frequency of urination: No  Blood in urine: No  Decreased frequency of urination: No  Frequent nighttime urination: Yes  Flank pain: No  Difficulty emptying bladder: Yes  Back pain: Yes  Muscle aches: No  Neck pain: No  Swollen joints: No  Joint pain: No  Bone pain: No  Muscle cramps: No  Muscle weakness: Yes  Joint stiffness: Yes  Bone fracture: No

## 2019-01-14 NOTE — NURSING NOTE
"Reason For Visit:   Chief Complaint   Patient presents with     Consult     Transfer of care from [facility]. F/U Lt shoulder TSA, appt per pt. Also check of Rt shoulder.        PCP: Hyun Mcgovern      ?  No  Occupation Retired Psychologist/admin.    Date of injury: None    Date of surgery: 2017  Type of surgery: Left TSA  Smoker: No    Right hand dominant    SANE score  Affected shoulder: Bilateral   Right shoulder SANE: 90  Left shoulder SANE: 80    Dynamometer  Forward Elevation:  R = 11 pounds,  L = 9 pounds  External Rotation:   R = 14 pounds,  L = 9 pounds    Ht 1.638 m (5' 4.5\")   Wt 72.1 kg (159 lb)   BMI 26.87 kg/m        Pain Assessment  Patient Currently in Pain: Arcenio Rhoades LPN      "

## 2019-02-05 ENCOUNTER — AMBULATORY - HEALTHEAST (OUTPATIENT)
Dept: NURSING | Facility: CLINIC | Age: 69
End: 2019-02-05

## 2019-02-05 ENCOUNTER — COMMUNICATION - HEALTHEAST (OUTPATIENT)
Dept: INTERNAL MEDICINE | Facility: CLINIC | Age: 69
End: 2019-02-05

## 2019-02-05 DIAGNOSIS — Z23 NEED FOR SHINGLES VACCINE: ICD-10-CM

## 2019-06-06 ENCOUNTER — HOSPITAL ENCOUNTER (OUTPATIENT)
Dept: MAMMOGRAPHY | Facility: CLINIC | Age: 69
Discharge: HOME | End: 2019-06-06
Attending: FAMILY MEDICINE

## 2019-06-06 DIAGNOSIS — Z12.31 VISIT FOR SCREENING MAMMOGRAM: ICD-10-CM

## 2019-07-09 ENCOUNTER — OFFICE VISIT - HEALTHEAST (OUTPATIENT)
Dept: FAMILY MEDICINE | Facility: CLINIC | Age: 69
End: 2019-07-09
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
## 2019-07-16 ENCOUNTER — COMMUNICATION - HEALTHEAST (OUTPATIENT)
Dept: SCHEDULING | Facility: CLINIC | Age: 69
End: 2019-07-16

## 2019-07-22 ENCOUNTER — OFFICE VISIT - HEALTHEAST (OUTPATIENT)
Dept: FAMILY MEDICINE | Facility: CLINIC | Age: 69
End: 2019-07-22

## 2019-07-22 DIAGNOSIS — E55.9 VITAMIN D DEFICIENCY: ICD-10-CM

## 2019-07-22 DIAGNOSIS — M25.551 PAIN OF BOTH HIP JOINTS: ICD-10-CM

## 2019-07-22 DIAGNOSIS — Z12.11 COLON CANCER SCREENING: ICD-10-CM

## 2019-07-22 DIAGNOSIS — M81.0 AGE-RELATED OSTEOPOROSIS WITHOUT CURRENT PATHOLOGICAL FRACTURE: ICD-10-CM

## 2019-07-22 DIAGNOSIS — Z00.00 ROUTINE GENERAL MEDICAL EXAMINATION AT A HEALTH CARE FACILITY: ICD-10-CM

## 2019-07-22 DIAGNOSIS — M35.00 SICCA SYNDROME (H): ICD-10-CM

## 2019-07-22 DIAGNOSIS — L98.9 SKIN LESION: ICD-10-CM

## 2019-07-22 DIAGNOSIS — R30.0 DYSURIA: ICD-10-CM

## 2019-07-22 DIAGNOSIS — M25.552 PAIN OF BOTH HIP JOINTS: ICD-10-CM

## 2019-07-22 DIAGNOSIS — Z00.00 ENCOUNTER FOR MEDICARE ANNUAL WELLNESS EXAM: ICD-10-CM

## 2019-07-22 LAB
ALBUMIN SERPL-MCNC: 4.1 G/DL (ref 3.5–5)
ALBUMIN UR-MCNC: NEGATIVE MG/DL
ALP SERPL-CCNC: 73 U/L (ref 45–120)
ALT SERPL W P-5'-P-CCNC: 17 U/L (ref 0–45)
ANION GAP SERPL CALCULATED.3IONS-SCNC: 8 MMOL/L (ref 5–18)
APPEARANCE UR: CLEAR
AST SERPL W P-5'-P-CCNC: 19 U/L (ref 0–40)
BACTERIA #/AREA URNS HPF: ABNORMAL HPF
BILIRUB SERPL-MCNC: 0.4 MG/DL (ref 0–1)
BILIRUB UR QL STRIP: NEGATIVE
BUN SERPL-MCNC: 18 MG/DL (ref 8–28)
CALCIUM SERPL-MCNC: 9.5 MG/DL (ref 8.5–10.5)
CHLORIDE BLD-SCNC: 106 MMOL/L (ref 98–107)
CO2 SERPL-SCNC: 27 MMOL/L (ref 22–31)
COLOR UR AUTO: YELLOW
CREAT SERPL-MCNC: 0.66 MG/DL (ref 0.6–1.1)
ERYTHROCYTE [DISTWIDTH] IN BLOOD BY AUTOMATED COUNT: 11.4 % (ref 11–14.5)
GFR SERPL CREATININE-BSD FRML MDRD: >60 ML/MIN/1.73M2
GLUCOSE BLD-MCNC: 96 MG/DL (ref 70–125)
GLUCOSE UR STRIP-MCNC: NEGATIVE MG/DL
HCT VFR BLD AUTO: 41.3 % (ref 35–47)
HGB BLD-MCNC: 14.3 G/DL (ref 12–16)
HGB UR QL STRIP: ABNORMAL
KETONES UR STRIP-MCNC: NEGATIVE MG/DL
LEUKOCYTE ESTERASE UR QL STRIP: NEGATIVE
MCH RBC QN AUTO: 34.1 PG (ref 27–34)
MCHC RBC AUTO-ENTMCNC: 34.6 G/DL (ref 32–36)
MCV RBC AUTO: 99 FL (ref 80–100)
MUCOUS THREADS #/AREA URNS LPF: ABNORMAL LPF
NITRATE UR QL: NEGATIVE
PH UR STRIP: 6 [PH] (ref 5–8)
PLATELET # BLD AUTO: 270 THOU/UL (ref 140–440)
PMV BLD AUTO: 7.6 FL (ref 7–10)
POTASSIUM BLD-SCNC: 4.2 MMOL/L (ref 3.5–5)
PROT SERPL-MCNC: 6.5 G/DL (ref 6–8)
RBC # BLD AUTO: 4.18 MILL/UL (ref 3.8–5.4)
RBC #/AREA URNS AUTO: ABNORMAL HPF
SODIUM SERPL-SCNC: 141 MMOL/L (ref 136–145)
SP GR UR STRIP: 1.02 (ref 1–1.03)
SQUAMOUS #/AREA URNS AUTO: ABNORMAL LPF
UROBILINOGEN UR STRIP-ACNC: ABNORMAL
WBC #/AREA URNS AUTO: ABNORMAL HPF
WBC: 5.6 THOU/UL (ref 4–11)

## 2019-07-22 ASSESSMENT — MIFFLIN-ST. JEOR: SCORE: 1208.4

## 2019-07-23 LAB
25(OH)D3 SERPL-MCNC: 43.6 NG/ML (ref 30–80)
25(OH)D3 SERPL-MCNC: 43.6 NG/ML (ref 30–80)
BACTERIA SPEC CULT: NO GROWTH

## 2019-07-24 LAB
LAB AP CHARGES (HE HISTORICAL CONVERSION): NORMAL
PATH REPORT.COMMENTS IMP SPEC: NORMAL
PATH REPORT.FINAL DX SPEC: NORMAL
PATH REPORT.GROSS SPEC: NORMAL
PATH REPORT.MICROSCOPIC SPEC OTHER STN: NORMAL
PATH REPORT.RELEVANT HX SPEC: NORMAL
RESULT FLAG (HE HISTORICAL CONVERSION): NORMAL

## 2019-09-16 ENCOUNTER — COMMUNICATION - HEALTHEAST (OUTPATIENT)
Dept: FAMILY MEDICINE | Facility: CLINIC | Age: 69
End: 2019-09-16

## 2019-09-16 DIAGNOSIS — N39.0 RECURRENT UTI: ICD-10-CM

## 2019-09-25 ENCOUNTER — AMBULATORY - HEALTHEAST (OUTPATIENT)
Dept: NURSING | Facility: CLINIC | Age: 69
End: 2019-09-25

## 2019-09-25 DIAGNOSIS — Z23 FLU VACCINE NEED: ICD-10-CM

## 2019-10-02 ENCOUNTER — HEALTH MAINTENANCE LETTER (OUTPATIENT)
Age: 69
End: 2019-10-02

## 2019-11-06 NOTE — PROGRESS NOTES
Pt feels poorly  Shoulder pain has been poorly controlled  Pt notes fatigue, dizziness nausea, with episode of emesis this am  Denies abd pain, chest pain, SOB    VSS  BP 80s-100s/  HR 70s  02 sats mid 90s RA    Alert, fully oriented, appears uncomfortable secondary to L shoulder pain  Lungs clear  CV rrr  Abd soft, non-distended, non-tender    Hgb 9.7 (9.7)  BMP nl      Assessment    S/p L TSA, poor pain control this am,   Nausea, emesis, dizziness, mild hypotension, likely secondary to medicatiojs  Acute BL anemia, stable  History of recurrent UTI, on suppressive augmentin    Plan  Change oxycodone to po dilaudid  Resume IV fluids  Anti-emetics  Monitor BP   Mechanical DVT prop   Urology Yes

## 2019-12-16 ENCOUNTER — HEALTH MAINTENANCE LETTER (OUTPATIENT)
Age: 69
End: 2019-12-16

## 2020-01-06 ENCOUNTER — OFFICE VISIT - HEALTHEAST (OUTPATIENT)
Dept: FAMILY MEDICINE | Facility: CLINIC | Age: 70
End: 2020-01-06

## 2020-01-06 DIAGNOSIS — Z11.59 ENCOUNTER FOR HCV SCREENING TEST FOR LOW RISK PATIENT: ICD-10-CM

## 2020-01-06 DIAGNOSIS — Z00.00 ENCOUNTER FOR MEDICARE ANNUAL WELLNESS EXAM: ICD-10-CM

## 2020-01-06 DIAGNOSIS — Z00.00 ROUTINE GENERAL MEDICAL EXAMINATION AT A HEALTH CARE FACILITY: ICD-10-CM

## 2020-01-06 DIAGNOSIS — N95.1 SYMPTOMATIC MENOPAUSAL OR FEMALE CLIMACTERIC STATES: ICD-10-CM

## 2020-01-06 DIAGNOSIS — M35.00 SICCA SYNDROME (H): ICD-10-CM

## 2020-01-06 DIAGNOSIS — R21 FACIAL RASH: ICD-10-CM

## 2020-01-06 DIAGNOSIS — H91.93 BILATERAL HEARING LOSS, UNSPECIFIED HEARING LOSS TYPE: ICD-10-CM

## 2020-01-06 DIAGNOSIS — M81.0 AGE-RELATED OSTEOPOROSIS WITHOUT CURRENT PATHOLOGICAL FRACTURE: ICD-10-CM

## 2020-01-06 ASSESSMENT — MIFFLIN-ST. JEOR: SCORE: 1199

## 2020-01-07 LAB
C REACTIVE PROTEIN LHE: <0.1 MG/DL (ref 0–0.8)
ERYTHROCYTE [SEDIMENTATION RATE] IN BLOOD BY WESTERGREN METHOD: 5 MM/HR (ref 0–20)

## 2020-01-08 LAB — HCV AB SERPL QL IA: NEGATIVE

## 2020-01-09 LAB — ANA SER QL: 0.4 U

## 2020-01-10 ENCOUNTER — AMBULATORY - HEALTHEAST (OUTPATIENT)
Dept: SCHEDULING | Facility: CLINIC | Age: 70
End: 2020-01-10

## 2020-01-10 DIAGNOSIS — N95.1 SYMPTOMATIC MENOPAUSAL OR FEMALE CLIMACTERIC STATES: ICD-10-CM

## 2020-01-10 DIAGNOSIS — M81.0 AGE-RELATED OSTEOPOROSIS WITHOUT CURRENT PATHOLOGICAL FRACTURE: ICD-10-CM

## 2020-01-21 ENCOUNTER — COMMUNICATION - HEALTHEAST (OUTPATIENT)
Dept: FAMILY MEDICINE | Facility: CLINIC | Age: 70
End: 2020-01-21

## 2020-02-06 ENCOUNTER — OFFICE VISIT - HEALTHEAST (OUTPATIENT)
Dept: AUDIOLOGY | Facility: CLINIC | Age: 70
End: 2020-02-06

## 2020-02-06 DIAGNOSIS — H90.3 SENSORINEURAL HEARING LOSS, BILATERAL: ICD-10-CM

## 2020-04-23 ENCOUNTER — COMMUNICATION - HEALTHEAST (OUTPATIENT)
Dept: OTOLARYNGOLOGY | Facility: CLINIC | Age: 70
End: 2020-04-23

## 2020-06-11 ENCOUNTER — COMMUNICATION - HEALTHEAST (OUTPATIENT)
Dept: ADMINISTRATIVE | Facility: CLINIC | Age: 70
End: 2020-06-11

## 2020-06-30 ENCOUNTER — RECORDS - HEALTHEAST (OUTPATIENT)
Dept: ADMINISTRATIVE | Facility: OTHER | Age: 70
End: 2020-06-30

## 2020-07-06 ENCOUNTER — OFFICE VISIT - HEALTHEAST (OUTPATIENT)
Dept: AUDIOLOGY | Facility: CLINIC | Age: 70
End: 2020-07-06

## 2020-07-06 ENCOUNTER — OFFICE VISIT - HEALTHEAST (OUTPATIENT)
Dept: OTOLARYNGOLOGY | Facility: CLINIC | Age: 70
End: 2020-07-06

## 2020-07-06 DIAGNOSIS — H93.13 TINNITUS OF BOTH EARS: ICD-10-CM

## 2020-07-06 DIAGNOSIS — H90.3 SENSORINEURAL HEARING LOSS, BILATERAL: ICD-10-CM

## 2020-07-06 DIAGNOSIS — H93.13 TINNITUS, BILATERAL: ICD-10-CM

## 2020-07-08 ENCOUNTER — COMMUNICATION - HEALTHEAST (OUTPATIENT)
Dept: ADMINISTRATIVE | Facility: CLINIC | Age: 70
End: 2020-07-08

## 2020-07-10 ENCOUNTER — HOSPITAL ENCOUNTER (OUTPATIENT)
Dept: MAMMOGRAPHY | Facility: CLINIC | Age: 70
Discharge: HOME | End: 2020-07-10
Attending: FAMILY MEDICINE

## 2020-07-10 DIAGNOSIS — Z12.31 VISIT FOR SCREENING MAMMOGRAM: ICD-10-CM

## 2020-07-13 ENCOUNTER — OFFICE VISIT - HEALTHEAST (OUTPATIENT)
Dept: AUDIOLOGY | Facility: CLINIC | Age: 70
End: 2020-07-13

## 2020-07-13 DIAGNOSIS — H90.3 SENSORINEURAL HEARING LOSS, BILATERAL: ICD-10-CM

## 2020-07-23 ENCOUNTER — OFFICE VISIT - HEALTHEAST (OUTPATIENT)
Dept: AUDIOLOGY | Facility: CLINIC | Age: 70
End: 2020-07-23

## 2020-07-23 DIAGNOSIS — H90.3 SENSORINEURAL HEARING LOSS, BILATERAL: ICD-10-CM

## 2020-08-13 ENCOUNTER — OFFICE VISIT - HEALTHEAST (OUTPATIENT)
Dept: AUDIOLOGY | Facility: CLINIC | Age: 70
End: 2020-08-13

## 2020-08-13 DIAGNOSIS — H90.3 SENSORINEURAL HEARING LOSS, BILATERAL: ICD-10-CM

## 2020-08-26 ENCOUNTER — RECORDS - HEALTHEAST (OUTPATIENT)
Dept: ADMINISTRATIVE | Facility: OTHER | Age: 70
End: 2020-08-26

## 2021-01-15 ENCOUNTER — HEALTH MAINTENANCE LETTER (OUTPATIENT)
Age: 71
End: 2021-01-15

## 2021-01-23 ENCOUNTER — COMMUNICATION - HEALTHEAST (OUTPATIENT)
Dept: FAMILY MEDICINE | Facility: CLINIC | Age: 71
End: 2021-01-23

## 2021-01-27 ENCOUNTER — OFFICE VISIT - HEALTHEAST (OUTPATIENT)
Dept: FAMILY MEDICINE | Facility: CLINIC | Age: 71
End: 2021-01-27

## 2021-01-27 DIAGNOSIS — Z00.00 ENCOUNTER FOR MEDICARE ANNUAL WELLNESS EXAM: ICD-10-CM

## 2021-01-27 DIAGNOSIS — M15.0 PRIMARY OSTEOARTHRITIS INVOLVING MULTIPLE JOINTS: ICD-10-CM

## 2021-01-27 DIAGNOSIS — M81.0 AGE-RELATED OSTEOPOROSIS WITHOUT CURRENT PATHOLOGICAL FRACTURE: ICD-10-CM

## 2021-01-27 DIAGNOSIS — E55.9 VITAMIN D DEFICIENCY: ICD-10-CM

## 2021-01-27 DIAGNOSIS — M35.00 SICCA SYNDROME (H): ICD-10-CM

## 2021-01-27 LAB
ALBUMIN SERPL-MCNC: 4.1 G/DL (ref 3.5–5)
ALP SERPL-CCNC: 90 U/L (ref 45–120)
ALT SERPL W P-5'-P-CCNC: 19 U/L (ref 0–45)
ANION GAP SERPL CALCULATED.3IONS-SCNC: 9 MMOL/L (ref 5–18)
AST SERPL W P-5'-P-CCNC: 20 U/L (ref 0–40)
BILIRUB SERPL-MCNC: 0.5 MG/DL (ref 0–1)
BUN SERPL-MCNC: 21 MG/DL (ref 8–28)
CALCIUM SERPL-MCNC: 9.2 MG/DL (ref 8.5–10.5)
CHLORIDE BLD-SCNC: 104 MMOL/L (ref 98–107)
CHOLEST SERPL-MCNC: 191 MG/DL
CO2 SERPL-SCNC: 27 MMOL/L (ref 22–31)
CREAT SERPL-MCNC: 0.68 MG/DL (ref 0.6–1.1)
ERYTHROCYTE [DISTWIDTH] IN BLOOD BY AUTOMATED COUNT: 10.8 % (ref 11–14.5)
FASTING STATUS PATIENT QL REPORTED: YES
GFR SERPL CREATININE-BSD FRML MDRD: >60 ML/MIN/1.73M2
GLUCOSE BLD-MCNC: 85 MG/DL (ref 70–125)
HCT VFR BLD AUTO: 37.1 % (ref 35–47)
HDLC SERPL-MCNC: 49 MG/DL
HGB BLD-MCNC: 12.4 G/DL (ref 12–16)
LDLC SERPL CALC-MCNC: 128 MG/DL
MCH RBC QN AUTO: 32.5 PG (ref 27–34)
MCHC RBC AUTO-ENTMCNC: 33.5 G/DL (ref 32–36)
MCV RBC AUTO: 97 FL (ref 80–100)
PLATELET # BLD AUTO: 248 THOU/UL (ref 140–440)
PMV BLD AUTO: 7.7 FL (ref 7–10)
POTASSIUM BLD-SCNC: 4.8 MMOL/L (ref 3.5–5)
PROT SERPL-MCNC: 6.6 G/DL (ref 6–8)
RBC # BLD AUTO: 3.83 MILL/UL (ref 3.8–5.4)
SODIUM SERPL-SCNC: 140 MMOL/L (ref 136–145)
TRIGL SERPL-MCNC: 71 MG/DL
WBC: 8.3 THOU/UL (ref 4–11)

## 2021-01-27 ASSESSMENT — MIFFLIN-ST. JEOR: SCORE: 1210.09

## 2021-01-28 LAB
25(OH)D3 SERPL-MCNC: 60.7 NG/ML (ref 30–80)
25(OH)D3 SERPL-MCNC: 60.7 NG/ML (ref 30–80)

## 2021-03-02 ENCOUNTER — COMMUNICATION - HEALTHEAST (OUTPATIENT)
Dept: SCHEDULING | Facility: CLINIC | Age: 71
End: 2021-03-02

## 2021-03-03 ENCOUNTER — COMMUNICATION - HEALTHEAST (OUTPATIENT)
Dept: SCHEDULING | Facility: CLINIC | Age: 71
End: 2021-03-03

## 2021-03-16 ENCOUNTER — OFFICE VISIT - HEALTHEAST (OUTPATIENT)
Dept: FAMILY MEDICINE | Facility: CLINIC | Age: 71
End: 2021-03-16

## 2021-03-16 DIAGNOSIS — Z09 HOSPITAL DISCHARGE FOLLOW-UP: ICD-10-CM

## 2021-03-16 DIAGNOSIS — R42 VERTIGO: ICD-10-CM

## 2021-03-16 ASSESSMENT — MIFFLIN-ST. JEOR: SCORE: 1216.89

## 2021-04-19 ENCOUNTER — RECORDS - HEALTHEAST (OUTPATIENT)
Dept: ADMINISTRATIVE | Facility: OTHER | Age: 71
End: 2021-04-19

## 2021-04-27 ENCOUNTER — OFFICE VISIT - HEALTHEAST (OUTPATIENT)
Dept: FAMILY MEDICINE | Facility: CLINIC | Age: 71
End: 2021-04-27

## 2021-04-27 DIAGNOSIS — R42 VERTIGO: ICD-10-CM

## 2021-05-26 ENCOUNTER — RECORDS - HEALTHEAST (OUTPATIENT)
Dept: ADMINISTRATIVE | Facility: CLINIC | Age: 71
End: 2021-05-26

## 2021-05-27 ENCOUNTER — OFFICE VISIT - HEALTHEAST (OUTPATIENT)
Dept: AUDIOLOGY | Facility: CLINIC | Age: 71
End: 2021-05-27

## 2021-05-27 DIAGNOSIS — H90.3 SENSORINEURAL HEARING LOSS, BILATERAL: ICD-10-CM

## 2021-05-28 ENCOUNTER — RECORDS - HEALTHEAST (OUTPATIENT)
Dept: ADMINISTRATIVE | Facility: CLINIC | Age: 71
End: 2021-05-28

## 2021-05-29 ENCOUNTER — RECORDS - HEALTHEAST (OUTPATIENT)
Dept: ADMINISTRATIVE | Facility: CLINIC | Age: 71
End: 2021-05-29

## 2021-05-30 ENCOUNTER — RECORDS - HEALTHEAST (OUTPATIENT)
Dept: ADMINISTRATIVE | Facility: CLINIC | Age: 71
End: 2021-05-30

## 2021-05-30 VITALS — BODY MASS INDEX: 27.49 KG/M2 | WEIGHT: 165 LBS | HEIGHT: 65 IN

## 2021-05-30 NOTE — PROGRESS NOTES
Assessment/ Plan  1. Wood tick bite  About 5 or 6 mm area of redness in the site, no significant induration or target lesion, reassured, but not a deer tick bite, no antibiotics needed.    Act that redness is due to local inflammation/reaction to saliva and parts  Subjective    Chief Complaint   Patient presents with     tick bite       HPI  68-year-old was at [place] cabin in the area this last week on Thursday when she felt something on her left leg.  She had her  look at it but it was without his glasses and he did not see anything.  The next day, she felt the same thing, pulled it off and it was a wood tick.  She understands the difference between would take anterior tach and is sure that this was bigger than a deer tick.  Since this time, she has had a slightly painful, slightly itchy area on her leg.  It is been stable in size for 3 days.  She otherwise feels well.  Current Outpatient Medications on File Prior to Visit   Medication Sig     amoxicillin-clavulanate (AUGMENTIN) 500-125 mg per tablet Take 1 tablet (500 mg total) by mouth daily.     predniSONE (DELTASONE) 10 mg tablet 6 tabs po daily x 2 d, 5 tabs po daily x 2 d, 4 tabs po daily x 2 d,  3 tabs po daily x 2 d,  2 tabs po daily x 2 d,  1 tab po daily x 2 d     triamcinolone (KENALOG) 0.1 % cream Apply topically sparingly two times a day until clears     No current facility-administered medications on file prior to visit.      Patient Active Problem List   Diagnosis     Menopause     Edema     Sjogren's Syndrome     Osteoporosis     Excessive Thirst     Cystocele     Female Stress Incontinence     Postmenopausal Atrophic Vaginitis     Urticaria     Peripheral neuropathy     Sciatica of right side     Hives     Cervicalgia     Osteoarthritis of both shoulders     Inflammation of joint of shoulder region     Arthralgia of hip     History of artificial joint     Osteoarthritis of hip     Greater trochanteric bursitis of left hip     S/P shoulder  replacement, left     Dermatitis     Vitamin D deficiency     Past Surgical History:   Procedure Laterality Date     HYSTERECTOMY  2004     OOPHORECTOMY Bilateral 2004     [place] ANTERIOR COLPORRAPHY RPR CYSTOCELE W/CYSTO      Description: Anterior Colporrhaphy, Repair Of Cystocele;  Recorded: 02/25/2008;     [place] APPENDECTOMY      Description: Appendectomy;  Recorded: 10/28/2008;     [place] REMOVAL GALLBLADDER      Description: Cholecystectomy;  Recorded: 10/28/2008;     [place] VAGINAL HYSTERECTOMY,UTERUS 250 GMS/<      Description: Vaginal Hysterectomy;  Recorded: 02/25/2012;  Comments: for hypermenorrhea/ prolapse; Dr. Cunningham     Family History   Problem Relation Age of Onset     Breast cancer Mother 57     Breast cancer Maternal Grandmother         Unsure of age       ROS  As listed above     Objective  Physical Exam  Vitals:    07/09/19 1519   BP: 100/62   Patient Site: Left Arm   Patient Position: Sitting   Cuff Size: Adult Regular   Pulse: 68   Resp: 16     Irregularly-shaped 5 or 6 mm area of redness without significant induration or ulceration, left posterior, medial thigh.  Normal surrounding skin.      Please note: Voice recognition software was used in this dictation.  It may therefore contain typographical errors.

## 2021-05-30 NOTE — TELEPHONE ENCOUNTER
"Pt reports \"new skin thing on chest.\"  \"Like a brownish mole.\"  \"Just noticed it 72 hours ago after feeling irritated from clothing.\"    \"Raised\".  \"Small -> \"smaller than a watermelon seed.\"  \"Mildly painful.\"  \"Hurts from friction with clothing.\"  \"Just different from other seborrheic keratoses.\"    Pt has upcoming appt for physical exam July 22nd.  Pt's QUESTIONS:  1)  If biopsy is determined necessary, can this be done in the office at time of physical (July 22nd)?  2)  Or, should pt schedule appt with her dermatologist instead?    Please advise; thank you.  Detailed voicemail message okay.     Gemma Landeros RN BSBA  [facility] Connection RN Triage     Reason for Disposition    [1] Skin growth or mole AND [2] border is irregular or blurry    [1] Skin growth or mole AND [2] sticks up out of the skin (elevated) AND [3] feels rough to the touch    Protocols used: SKIN LESION - MOLES OR GROWTHS-A-AH      "

## 2021-05-31 ENCOUNTER — RECORDS - HEALTHEAST (OUTPATIENT)
Dept: ADMINISTRATIVE | Facility: CLINIC | Age: 71
End: 2021-05-31

## 2021-05-31 VITALS — WEIGHT: 161.6 LBS | HEIGHT: 64 IN | BODY MASS INDEX: 27.59 KG/M2

## 2021-06-01 ENCOUNTER — RECORDS - HEALTHEAST (OUTPATIENT)
Dept: ADMINISTRATIVE | Facility: CLINIC | Age: 71
End: 2021-06-01

## 2021-06-01 VITALS — WEIGHT: 160 LBS | BODY MASS INDEX: 27.25 KG/M2

## 2021-06-01 VITALS — HEIGHT: 64 IN | BODY MASS INDEX: 27.88 KG/M2 | WEIGHT: 163.31 LBS

## 2021-06-01 NOTE — TELEPHONE ENCOUNTER
RN cannot approve Refill Request    RN can NOT refill this medication med is not covered by policy/route to provider. Last office visit: 5/21/2018 Hyun Mcgovern MD Last Physical: 7/22/2019 Last MTM visit: Visit date not found Last visit same specialty: 7/9/2019 Seferino Rene MD.  Next visit within 3 mo: Visit date not found  Next physical within 3 mo: Visit date not found      Cristiane Cat, [facility] Connection Triage/Med Refill 9/16/2019    Requested Prescriptions   Pending Prescriptions Disp Refills     amoxicillin-clavulanate (AUGMENTIN) 500-125 mg per tablet [Pharmacy Med Name: AMOX-CLAV 500-125 MG TABLET] 90 tablet 3     Sig: TAKE 1 TABLET BY MOUTH EVERY DAY       There is no refill protocol information for this order

## 2021-06-02 ENCOUNTER — RECORDS - HEALTHEAST (OUTPATIENT)
Dept: ADMINISTRATIVE | Facility: CLINIC | Age: 71
End: 2021-06-02

## 2021-06-03 VITALS — WEIGHT: 156.4 LBS | BODY MASS INDEX: 26.7 KG/M2 | HEIGHT: 64 IN

## 2021-06-05 NOTE — PROGRESS NOTES
Assessment and Plan:     1. Encounter for Medicare annual wellness exam     2. Routine general medical examination at a health care facility     3. Menopause  DXA Bone Density Scan   4. Encounter for HCV screening test for low risk patient  Hepatitis C Antibody (Anti-HCV)   5. Age-related osteoporosis without current pathological fracture  DXA Bone Density Scan   6. Facial rash  Antinuclear Antibodies Screen (CRIS)    Erythrocyte Sedimentation Rate    C-Reactive Protein   7. Bilateral hearing loss, unspecified hearing loss type  Pure Tone Audiometry Air    Ambulatory referral to Audiology   8. Sicca syndrome (H)       This is a 70 yo female - here for AWV/physical exam:  1.  Health Maintenance - discussed recommendation for hepatitis C screening - ordered;   2.  Menopause/age related osteoporosis - due for DXA bone density scan - ordered; discussed need for physical activity/weight bearing, Vitamin D and Calcium supplements  3.  Facial rash - has underlying rosacea, but patient is concerned this may be lupus - will check labs - I'm suspicious this is more likely rosacea  4.  Sicca syndrome - controlled currently  5.  Bilateral hearing loss - audiometry performed - patient has some hearing loss - would benefit from audiology evaluation.       The patient's current medical problems were reviewed.    I have had an Advance Directives discussion with the patient.  The following health maintenance schedule was reviewed with the patient and provided in printed form in the after visit summary:   Health Maintenance   Topic Date Due     DXA SCAN  08/16/2019     MEDICARE ANNUAL WELLNESS VISIT  01/06/2021     FALL RISK ASSESSMENT  01/06/2021     MAMMOGRAM  06/06/2021     LIPID  07/17/2023     TD 18+ HE  11/18/2023     ADVANCE CARE PLANNING  01/06/2025     COLONOSCOPY  12/04/2029     HEPATITIS C SCREENING  Completed     PNEUMOCOCCAL IMMUNIZATION 65+ LOW/MEDIUM RISK  Completed     INFLUENZA VACCINE RULE BASED  Completed      ZOSTER VACCINES  Completed        Subjective:   Chief Complaint: Cliff Dai is an 69 y.o. female here for an Annual Wellness visit.   HPI:      Couldn't walk a couple years ago - left hip pain  Goes to [facility] - was going weekly, now monthly  Had hyperextended right arm -     Worries about memory -     Fell on ice -     Immunizations      Review of Systems: Please see above.  The rest of the review of systems are negative for all systems.    Patient Care Team:  Hyun Mcgovern MD as PCP - General  Hyun Mcgovern MD as Assigned PCP     Patient Active Problem List   Diagnosis     Menopause     Edema     Sjogren's Syndrome     Osteoporosis     Excessive Thirst     Cystocele     Female Stress Incontinence     Postmenopausal Atrophic Vaginitis     Urticaria     Peripheral neuropathy     Sciatica of right side     Hives     Cervicalgia     Osteoarthritis of both shoulders     Inflammation of joint of shoulder region     Arthralgia of hip     History of artificial joint     Osteoarthritis of hip     Greater trochanteric bursitis of left hip     S/P shoulder replacement, left     Dermatitis     Vitamin D deficiency     Skin lesion     History reviewed. No pertinent past medical history.   Past Surgical History:   Procedure Laterality Date     HYSTERECTOMY  2004     OOPHORECTOMY Bilateral 2004     TX ANTERIOR COLPORRAPHY RPR CYSTOCELE W/CYSTO      Description: Anterior Colporrhaphy, Repair Of Cystocele;  Recorded: 02/25/2008;     TX APPENDECTOMY      Description: Appendectomy;  Recorded: 10/28/2008;     TX REMOVAL GALLBLADDER      Description: Cholecystectomy;  Recorded: 10/28/2008;     TX VAGINAL HYSTERECTOMY,UTERUS 250 GMS/<      Description: Vaginal Hysterectomy;  Recorded: 02/25/2012;  Comments: for hypermenorrhea/ prolapse; Dr. Cunningham      Family History   Problem Relation Age of Onset     Breast cancer Mother 57     Breast cancer Maternal Grandmother         Unsure  of age      Social History     Socioeconomic History     Marital status:      Spouse name: Not on file     Number of children: Not on file     Years of education: Not on file     Highest education level: Not on file   Occupational History     Not on file   Social Needs     Financial resource strain: Not on file     Food insecurity:     Worry: Not on file     Inability: Not on file     Transportation needs:     Medical: Not on file     Non-medical: Not on file   Tobacco Use     Smoking status: Former Smoker     Types: Cigarettes     Smokeless tobacco: Never Used   Substance and Sexual Activity     Alcohol use: Not on file     Drug use: Not on file     Sexual activity: Not on file   Lifestyle     Physical activity:     Days per week: Not on file     Minutes per session: Not on file     Stress: Not on file   Relationships     Social connections:     Talks on phone: Not on file     Gets together: Not on file     Attends [employer] service: Not on file     Active member of club or organization: Not on file     Attends meetings of clubs or organizations: Not on file     Relationship status: Not on file     Intimate partner violence:     Fear of current or ex partner: Not on file     Emotionally abused: Not on file     Physically abused: Not on file     Forced sexual activity: Not on file   Other Topics Concern     Not on file   Social History Narrative     Not on file      Current Outpatient Medications   Medication Sig Dispense Refill     amoxicillin-clavulanate (AUGMENTIN) 500-125 mg per tablet TAKE 1 TABLET BY MOUTH EVERY DAY 90 tablet 3     cetirizine (ZYRTEC) 10 mg cap        predniSONE (DELTASONE) 10 mg tablet 6 tabs po daily x 2 d, 5 tabs po daily x 2 d, 4 tabs po daily x 2 d,  3 tabs po daily x 2 d,  2 tabs po daily x 2 d,  1 tab po daily x 2 d 42 tablet 0     triamcinolone (KENALOG) 0.1 % cream Apply topically sparingly two times a day until clears 30 g 1     No current facility-administered medications for  "this visit.       Objective:   Vital Signs:   Visit Vitals  /80 (Patient Site: Right Arm, Patient Position: Sitting, Cuff Size: Adult Regular)   Pulse 76   Resp 16   Ht 5' 4.17\" (1.63 m)   Wt 155 lb 11.2 oz (70.6 kg)   LMP 02/17/2001   BMI 26.58 kg/m         VisionScreening:   Visual Acuity Screening    Right eye Left eye Both eyes   Without correction: 10/12.5 10/16 10/12.5   With correction:      Comments: Plus Lens: Fail: clear vision with +2.50 lens glasses       PHYSICAL EXAM  EXAM:  /80 (Patient Site: Right Arm, Patient Position: Sitting, Cuff Size: Adult Regular)   Pulse 76   Resp 16   Ht 5' 4.17\" (1.63 m)   Wt 155 lb 11.2 oz (70.6 kg)   LMP 02/17/2001   BMI 26.58 kg/m     Gen:  NAD, appears well, well-hydrated  HEENT:  TMs nl, oropharynx benign, nasal mucosa nl, conjunctiva clear  Neck:  Supple, no adenopathy, no thyromegaly, no carotid bruits, no JVD  Lungs:  Clear to auscultation bilaterally  Breast exam:  No breast lumps, no skin changes, no nipple discharge, no axillary adenopathy  Cor:  RRR no murmur  Abd:  Soft, nontender, BS+, no masses, no guarding or rebound, no HSM  Extr:  Neg.  Neuro:  No asymmetry, Nl motor tone/strength, nl sensation, reflexes =, gait nl, nl coordination, CN intact,   Skin:  Warm/dry, mild redness across malar distribution        Assessment Results 1/6/2020   Activities of Daily Living No help needed   Instrumental Activities of Daily Living No help needed   Mini Cog Total Score 5   Some recent data might be hidden     A Mini-Cog score of 0-2 suggests the possibility of dementia, score of 3-5 suggests no dementia    Identified Health Risks:     The patient was provided with written information regarding signs of hearing loss.  Information on urinary incontinence and treatment options given to patient.  She is at risk for falling and has been provided with information to reduce the risk of falling at home.  Patient's advanced directive was discussed and I am " comfortable with the patient's wishes.

## 2021-06-07 ENCOUNTER — COMMUNICATION - HEALTHEAST (OUTPATIENT)
Dept: AUDIOLOGY | Facility: CLINIC | Age: 71
End: 2021-06-07

## 2021-06-07 NOTE — TELEPHONE ENCOUNTER
Patient called stating she has noticed ringing in both of her ears for the past 3 days. She has had ringing in the past as well. I explained that due to COVID-19 we are only seeing patients on a urgent basis and tinnitus is not urgent. I explained that once we are open, we can see her with a hearing test to be evaluated. She agreed.    Nette Siegel RN  [facility]  [phone]

## 2021-06-08 NOTE — PROGRESS NOTES
Assessment/Plan:      Visit for Preoperative Exam.    1. Preop examination  XR Chest PA and Lateral    Electrocardiogram Perform - Clinic    Hemoglobin   2. Primary osteoarthritis of both shoulders     3. Edema     4. History of recurrent UTIs  amoxicillin-clavulanate (AUGMENTIN) 500-125 mg per tablet   5. Pruritus  loratadine (CLARITIN) 10 mg tablet         67 yo female with ongoing left shoulder pain.  Now with advanced osteoarthritis - medically stable.  Reviewed labs, CXR, EKG.  OK for surgery.     Of note, patient takes prophylactic antibiotics for recurrent UTI history.      Subjective:     Scheduled Procedure: Left Shoulder Replacement  Surgery Date:  2/1/17  Surgery Location:  [facility]  Surgeon:  Dr. Gonzalez    67 yo female with chronic left shoulder pain - has continued to advance and sequential xrays indicate worsening osteoarthritis with limiting ROM.  Has failed conservative therapy - is scheduled for left total shoulder arthroplasty.      Current Outpatient Prescriptions   Medication Sig Dispense Refill     naproxen sodium (ALEVE) 220 MG tablet Take 220 mg by mouth as needed for pain.       amoxicillin-clavulanate (AUGMENTIN) 500-125 mg per tablet Take 0.5 tablets (250 mg total) by mouth daily. 15 tablet 0     loratadine (CLARITIN) 10 mg tablet Take 1 tablet (10 mg total) by mouth daily. 30 tablet 0     triamcinolone (KENALOG) 0.1 % cream 1 APPLICATION FOUR TIMES A DAY TOPICALLY  4     No current facility-administered medications for this visit.        No Known Allergies    Immunization History   Administered Date(s) Administered     DT (pediatric) 10/01/1991, 01/01/1999     Hep A, historic 01/08/1997, 01/06/2010     Hep B, historic 11/18/2013     IPV 01/08/1997     Influenza [id number], 12/17/2009     Influenza high dose, seasonal 10/01/2015, 10/13/2016     Influenza, Seasonal, Inj PF 11/18/2013     Influenza, inj, historic 11/14/2007, 10/28/2008, 09/15/2009     Influenza, seasonal,quad inj 6-35  mos 10/14/2010, 10/31/2011, 09/19/2012     Influenza,seasonal quad, PF 10/23/2014     Pneumo Conj 13-V (2010&after) 10/23/2014, 05/15/2015     Td, historic 12/14/2007     Tdap 12/14/2007, 11/18/2013     ZOSTER 07/01/2015       Patient Active Problem List   Diagnosis     Menopause     Edema     Sjogren's Syndrome     Osteoporosis     Excessive Thirst     Cystocele     Female Stress Incontinence     Postmenopausal Atrophic Vaginitis     Urticaria     Peripheral neuropathy     Sciatica of right side     Hives     Cervicalgia     Osteoarthritis of both shoulders       History reviewed. No pertinent past medical history.    Social History     Social History     Marital status:      Spouse name: N/A     Number of children: N/A     Years of education: N/A     Occupational History     Not on file.     Social History Main Topics     Smoking status: Former Smoker     Types: Cigarettes     Smokeless tobacco: Not on file     Alcohol use Not on file     Drug use: Not on file     Sexual activity: Not on file     Other Topics Concern     Not on file     Social History Narrative       Past Surgical History   Procedure Laterality Date     Pr anter colporrhaphy,blad/vagina       Description: Anterior Colporrhaphy, Repair Of Cystocele;  Recorded: 02/25/2008;     Pr appendectomy       Description: Appendectomy;  Recorded: 10/28/2008;     Pr removal gallbladder       Description: Cholecystectomy;  Recorded: 10/28/2008;     Pr vaginal hysterectomy,uterus 250 gms/<       Description: Vaginal Hysterectomy;  Recorded: 02/25/2012;  Comments: for hypermenorrhea/ prolapse; Dr. Cunningham     Hysterectomy  2004     Oophorectomy Bilateral 2004       History of Present Illness  Recent Health  Fever: no  Chills: no  Fatigue: no  Chest Pain: no  Cough: no  Dyspnea: no  Urinary Frequency: no  Nausea: no  Vomiting: no  Diarrhea: no  Abdominal Pain: no  Easy Bruising: no  Lower Extremity Swelling: no  Poor Exercise Tolerance: no    Most recent  "Health Maintenance Visit:  1 year(s) ago    Pertinent History  Prior Anesthesia: yes  Previous Anesthesia Reaction:  no  Diabetes: no  Cardiovascular Disease: no  Pulmonary Disease: no  Renal Disease: no  GI Disease: no  Sleep Apnea: no  Thromboembolic Problems: no  Clotting Disorder: no  Bleeding Disorder: no  Transfusion Reaction: unknown  Impaired Immunity: no  Steroid use in the last 6 months: no  Frequent Aspirin use: no    Family history of none   Family History   Problem Relation Age of Onset     Breast cancer Mother 57     Breast cancer Maternal Grandmother      Unsure of age         Social history of patient does not wear denture or partial plates, there is no transfusion refusal and there are no concerns regarding care after surgery    After surgery, the patient plans to recover at home with family.    Review of Systems  Pertinent positives as noted in HPI; otherwise 12 point ROS negative.            Objective:         Vitals:    01/13/17 0923   BP: 124/82   Pulse: 76   Resp: 16   Temp: 98.1  F (36.7  C)   TempSrc: Oral   Weight: 165 lb (74.8 kg)   Height: 5' 4.5\" (1.638 m)       Physical Exam:  EXAM:  Visit Vitals     /82 (Patient Site: Right Arm, Patient Position: Sitting, Cuff Size: Adult Regular)     Pulse 76     Temp 98.1  F (36.7  C) (Oral)     Resp 16     Ht 5' 4.5\" (1.638 m)     Wt 165 lb (74.8 kg)     LMP 02/17/2001     BMI 27.88 kg/m2      Gen:  NAD, appears well, well-hydrated  HEENT:  TMs nl, oropharynx benign, nasal mucosa nl, conjunctiva clear  Neck:  Supple, no adenopathy, no thyromegaly, no carotid bruits, no JVD  Lungs:  Clear to auscultation bilaterally  Cor:  RRR no murmur  Abd:  Soft, nontender, BS+, no masses, no guarding or rebound, no HSM  Extr:  Decreased ROM bilateral shoulders  Neuro:  No asymmetry, Nl motor tone/strength, nl sensation, reflexes =, gait nl, nl coordination, CN intact,   Skin:  Warm/dry          Results for orders placed or performed in visit on 01/13/17 "   Hemoglobin   Result Value Ref Range    Hemoglobin 12.7 12.0 - 16.0 g/dL       EKG:  NSR , no acute ST-T changes    XR CHEST PA AND LATERAL1/13/2017 11:09 AMINDICATION: Encounter for other preprocedural examination. Total shoulder replacement.COMPARISON: None.FINDINGS: Negative chest.This report was electronically interpreted by: Dr. Edmund Stephenson MD ON 01/13/2017 at   11:28

## 2021-06-08 NOTE — PROGRESS NOTES
Patient came in for an EKG as part of her preop exam.  EKG sent to [facility] and to the [facility] for her preop.    Nydia Torres, [facility]

## 2021-06-08 NOTE — TELEPHONE ENCOUNTER
Pt saw Ashleysurinder Ramirez on 2/6 for a hearing test and at that time did not have any symptoms of tinnitus. She is now having those symptoms and also wanted to talk about Hearing Aid options. She's not sure what type of appointment she needs to schedule. She would like to speak with Ashley to discuss. She can be reached at [phone].

## 2021-06-09 NOTE — PROGRESS NOTES
ASSESSMENT/PLAN:  1. Left hip pain  MR Hip Without Contrast Left    MR Pelvis Without Contrast   2. Pelvic joint pain, left  MR Hip Without Contrast Left    MR Pelvis Without Contrast       This is a 66-year-old female, with left hip pain.  She presents at the request of her massage therapist.  She has been undergoing therapy for her left shoulder, which she had surgery on a couple months ago.  This is improving.  In the meanwhile, she has developed increasing pain in her left hip.  Denies any specific injury.  She has pain in the groin of the left side, but her massage therapist told her she thought it was the piriformis muscle that was giving her difficulty.  She has recently well-preserved range of motion in this left hip, but some tenderness posterior in the pelvis, tenderness along the inguinal canal in the left, no pain over the greater trochanter.  I think it is probably reasonable to check the hip and pelvis on the left with an MRI scan before making further referrals or further recommendations.  She will continue to do some lower extremity exercises while she rehabs that shoulder.        There are no discontinued medications.  There are no Patient Instructions on file for this visit.    Chief Complaint:  Chief Complaint   Patient presents with     Hip Pain     pt has had left hip pain since November, has seen a massage therapist and was told it is muscle related       HPI:   Cliff Dai is a 66 y.o. female c/o  Has had left hip pain since November  Was doing back therapy - was limping due to hip pain  Could walk, but couldn't go on a walk  Massage therapist - thought it was piriformis muscle  No injury to hip  Years ago - 1997 - back injury - mostly right sided - was exercising at 5:30 in a.m., had bulging disc  Had torn hamstring on left side years ago - slipped in the grass at a picnic    Has been going to gym trying to do things      PMH:   Patient Active Problem List    Diagnosis Date Noted      Osteoarthritis of both shoulders 01/13/2017     Cervicalgia 08/15/2016     Hives 01/27/2016     Sciatica of right side 01/04/2016     Peripheral neuropathy 07/01/2015     Urticaria      Menopause      Edema      Sjogren's Syndrome      Osteoporosis      Excessive Thirst      Cystocele      Female Stress Incontinence      Postmenopausal Atrophic Vaginitis      History reviewed. No pertinent past medical history.  Past Surgical History:   Procedure Laterality Date     HYSTERECTOMY  2004     OOPHORECTOMY Bilateral 2004     NE ANTER COLPORRHAPHY,BLAD/VAGINA      Description: Anterior Colporrhaphy, Repair Of Cystocele;  Recorded: 02/25/2008;     NE APPENDECTOMY      Description: Appendectomy;  Recorded: 10/28/2008;     NE REMOVAL GALLBLADDER      Description: Cholecystectomy;  Recorded: 10/28/2008;     NE VAGINAL HYSTERECTOMY,UTERUS 250 GMS/<      Description: Vaginal Hysterectomy;  Recorded: 02/25/2012;  Comments: for hypermenorrhea/ prolapse; Dr. Cunningham     Social History     Social History     Marital status:      Spouse name: N/A     Number of children: N/A     Years of education: N/A     Occupational History     Not on file.     Social History Main Topics     Smoking status: Former Smoker     Types: Cigarettes     Smokeless tobacco: Not on file     Alcohol use Not on file     Drug use: Not on file     Sexual activity: Not on file     Other Topics Concern     Not on file     Social History Narrative       Meds:    Current Outpatient Prescriptions:      amoxicillin-clavulanate (AUGMENTIN) 500-125 mg per tablet, Take 0.5 tablets (250 mg total) by mouth daily., Disp: 15 tablet, Rfl: 0     loratadine (CLARITIN) 10 mg tablet, Take 1 tablet (10 mg total) by mouth daily., Disp: 30 tablet, Rfl: 0     naproxen sodium (ALEVE) 220 MG tablet, Take 220 mg by mouth as needed for pain., Disp: , Rfl:      triamcinolone (KENALOG) 0.1 % cream, 1 APPLICATION FOUR TIMES A DAY TOPICALLY, Disp: , Rfl: 4    Allergies:  Allergies    Allergen Reactions     Cephalexin Itching       ROS:  Pertinent positives as noted in HPI; otherwise 12 point ROS negative.      Physical Exam:  EXAM:  Visit Vitals     BP 96/52 (Patient Site: Right Arm, Patient Position: Sitting, Cuff Size: Adult Regular)     Pulse 74     Temp 97.6  F (36.4  C) (Oral)     Resp 16     LMP 02/17/2001      Gen:  NAD, appears well, well-hydrated  HEENT:  TMs nl, oropharynx benign, nasal mucosa nl, conjunctiva clear  Neck:  Supple, no adenopathy, no thyromegaly, no carotid bruits, no JVD  Lungs:  Clear to auscultation bilaterally  Cor:  RRR no murmur  Abd:  Soft, nontender, BS+, no masses, no guarding or rebound, no HSM  Extr:  Improved ROM left shoulder; left hip - no pain to palpation overlying greater trochanter, does have tenderness to palpation at sciatic notch, some pain with palpation in inguinal region, nl ROM left hip  Neuro:  No asymmetry  Skin:  Warm/dry

## 2021-06-09 NOTE — PROGRESS NOTES
HISTORY OF PRESENT ILLNESS  Asked to see by Dr. Mcgovern for evaluation of ear issues. Patient reports that she has ringing in the ears. She feels that the ringing started around February of this year after her hearing test. She noticed it after the hearing test. She got the hearing test because she was having concerns. No vertigo. No pain, pressure or fullness. No otorrhea. No history of noise exposure.     REVIEW OF SYSTEMS  Review of Systems: a 10-system review was performed. Pertinent positives are noted in the HPI and on a separate scanned document in the chart.    PMH, PSH, FH and SH has documented in the EHR.      EXAM    CONSTITUTIONAL  General Appearance:  Normal, well developed, well nourished, no obvious distress  Ability to Communicate:  communicates appropriately.    HEAD AND FACE  Appearance and Symmetry:  Normal, no scalp or facial scarring or suspicious lesions.  Paranasal sinuses tenderness:  Normal, Paranasal sinuses non tender    EARS  Clinical speech reception threshold:  Normal, able to hear normal speech.  Auricle:  Normal, Auricles without scars, lesions, masses.  External auditory canal:  Normal, External auditory canal normal.  Tympanic membrane:  Normal, Tympanic membranes normal without swelling or erythema.  Tympanic membrane mobility:  Normal, Normal tympanic membrane mobility.    NOSE (speculum or scope)  Architecture:  Normal, Grossly normal external nasal architecture with no masses or lesions.  Mucosa:  Normal mucosa, No polyps or masses.  Septum:  Normal, Septum non-obstructing.  Turbinates:  Normal, No turbinate abnormalities    ORAL CAVITY AND OROPHARYNX  Lips:  Normal.  Dental and gingiva:  Normal, No obvious dental or gingival disease.  Mucosa:  Normal, Moist mucous membranes.  Tongue:  Normal, Tongue mobile with no mucosal abnormalities  Hard and soft palate:  Normal, Hard and soft palate without cleft or mucosal lesions.  Oral pharynx:  Normal, Posterior pharynx  without lesions or remarkable asymmetry.  Saliva:  Normal, Clear saliva.  Masses:  Normal, No palpable masses or pathologically enlarged lymph nodes.    NECK  Masses/lymph nodes:  Normal, No worrisome neck masses or lymph nodes.  Salivary glands:  Normal, Parotid and submandibular glands.  Trachea and larynx position:  Normal, Trachea and larynx midline.  Thyroid:  Normal, No thyroid abnormality.  Tenderness:  Normal, No cervical tenderness.  Suppleness:  Normal, Neck supple    NEUROLOGICAL  Speech pattern:  Normal, Proasaic    RESPIRATORY  Symmetry and Respiratory effort:  Normal, Symmetric chest movement and expansion with no increased intercostal retractions or use of accessory muscles.     HEARING TEST  Results of hearing test as documented in audiology notes which were reviewed.    IMPRESSION  Bilateral asymmetric SNHL with tinnitus.     RECOMMENDATION  Findings discussed. She is a candidate for hearing aid. She wants to discuss with audiology. I also discussed the relationship of hearing loss with tinnitus. She expressed understanding. I discussed masking as the most effective way to ease the impact of tinnitus.     Theron Gentile MD

## 2021-06-09 NOTE — PROGRESS NOTES
AUDIOLOGY REPORT    SUBJECTIVE: Cliff Dai, 69 y.o.  year old female, was seen on 07/13/20 for a hearing aid evaluation. She was accompanied by herself. Her hearing was assessed on 7/6/2020 and results revealed normal sloping to moderately severe sensorineural hearing loss bilaterally. Medical clearance was provided by Dr. Turk.     OBJECTIVE: Manufacturers, styles, monaural vs binaural fittings, trial period, technology levels, and realistic expectations were discussed. Cliff would like ALEJANDRO hearing aids that are rechargeable.    Hearing Aids  : [employer]  Model:  Eloquaeo M70-R  Style:   ALEJANDRO  : 1xS    ASSESSMENT: Hearing aids ordered. [facility] brochure provided. Purchase agreement to be signed at that time.    PLAN: Cliff will return in 1week for a hearing aid fitting. Please contact our clinic at [phone] with questions or concerns.    Ralf Santo, [facility], [place]  Clinical Audiologist  [place] #[id number]

## 2021-06-10 NOTE — PROGRESS NOTES
AUDIOLOGY REPORT    SUBJECTIVE: Cliff Dai, 70 y.o. year old female, was seen on 08/13/20 for a hearing aid check. Cliff was fit with bilateral Phonak Audeo M70-R ALEJANDRO hearing aids on 7/23/2020. Her hearing was assessed on 7/6/2020 and results revealed normal sloping to moderately severe sensorineural hearing loss bilaterally. Medical clearance was provided by Dr. Gentile.     Today, Cliff reports some improvement in her listening environments while wearing the hearing aids. She reports that the right hearing aid seems to fall out every once in a while.    OBJECTIVE:     Programming changes: 90% target increased to 100% target.    Data Logging: 15 hours per day     Reviewed cleaning and wax traps. Cliff changed the cerushields in office. Retention cord replaced at the right side.    ASSESSMENT: Hearing aid check completed.    PLAN: It is recommended that Cliff return in 6 months for hearing aid care, or sooner should concerns arise. Please call our clinic at [phone] with questions or concerns.    Ralf Santo, [facility]  Clinical Audiologist  [place] #[id number]
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
## 2021-06-14 NOTE — PROGRESS NOTES
Assessment and Plan:     1. Encounter for Medicare annual wellness exam  Lipid Schley FASTING    Comprehensive Metabolic Panel    HM2(CBC w/o Differential)   2. Vitamin D deficiency  Vitamin D, Total (25-Hydroxy)   3. Sicca syndrome (H)     4. Age-related osteoporosis without current pathological fracture     5. Primary osteoarthritis involving multiple joints       This is a 71 yo female here for AWV:  1.  Vitamin D deficiency - check levels  2.  Sicca syndrome with Sjogrens syndrome - generally controlled  3.  Osteoporosis - working on weight bearing activity   4.  Primary DJD - multiple joints - patient works with her extended providers for relief - and does well; remains active.   5.  Health Maintenance - will check labs - continue to follow levels.      Patient has been advised of split billing requirements and indicates understanding: Yes      The patient's current medical problems were reviewed.    I have had an Advance Directives discussion with the patient.  The following health maintenance schedule was reviewed with the patient and provided in printed form in the after visit summary:   Health Maintenance   Topic Date Due     MEDICARE ANNUAL WELLNESS VISIT  01/27/2022     FALL RISK ASSESSMENT  01/27/2022     MAMMOGRAM  07/10/2022     TD 18+ HE  11/18/2023     LIPID  01/27/2026     ADVANCE CARE PLANNING  01/27/2026     COLORECTAL CANCER SCREENING  12/04/2029     DEXA SCAN  01/15/2035     HEPATITIS C SCREENING  Completed     Pneumococcal Vaccine: 65+ Years  Completed     INFLUENZA VACCINE RULE BASED  Completed     ZOSTER VACCINES  Completed     Pneumococcal Vaccine: Pediatrics (0 to 5 Years) and At-Risk Patients (6 to 64 Years)  Aged Out     HEPATITIS B VACCINES  Aged Out        Subjective:   Chief Complaint: Cliff Dai is an 70 y.o. female here for an Annual Wellness visit.   HPI:    Zoom yoga  Zoom Binger    New hearing aid  Has tinnitus    Up 2-3 times/ night for voiding  Drinks a lot of  water  Dog walks/yoga  Does an [employer] weightlifting routine    Gets some stiffness   Gets therapeutic massages - at [address] -   Sees chiropractor as well -     Vitamin D 2500 units daily (x 2 = 5000 units)  Wonders if Vitamin E    Review of Systems:    Please see above.  The rest of the review of systems are negative for all systems.    Patient Care Team:  Hyun Mcgovern MD as PCP - General  Hyun Mcgovern MD as Assigned PCP  Theron Gentile MD as Assigned Surgical Provider     Patient Active Problem List   Diagnosis     Menopause     Edema     Sjogren's Syndrome     Osteoporosis     Excessive Thirst     Cystocele     Female Stress Incontinence     Postmenopausal Atrophic Vaginitis     Urticaria     Peripheral neuropathy     Sciatica of right side     Hives     Cervicalgia     Osteoarthritis of both shoulders     Inflammation of joint of shoulder region     Arthralgia of hip     History of artificial joint     Osteoarthritis of hip     Greater trochanteric bursitis of left hip     S/P shoulder replacement, left     Dermatitis     Vitamin D deficiency     Skin lesion     History reviewed. No pertinent past medical history.   Past Surgical History:   Procedure Laterality Date     HYSTERECTOMY  2004     OOPHORECTOMY Bilateral 2004     HI ANTERIOR COLPORRAPHY RPR CYSTOCELE W/CYSTO      Description: Anterior Colporrhaphy, Repair Of Cystocele;  Recorded: 02/25/2008;     HI APPENDECTOMY      Description: Appendectomy;  Recorded: 10/28/2008;     HI REMOVAL GALLBLADDER      Description: Cholecystectomy;  Recorded: 10/28/2008;     HI VAGINAL HYSTERECTOMY,UTERUS 250 GMS/<      Description: Vaginal Hysterectomy;  Recorded: 02/25/2012;  Comments: for hypermenorrhea/ prolapse; Dr. Cunningham      Family History   Problem Relation Age of Onset     Breast cancer Mother 57     Breast cancer Maternal Grandmother         Unsure of age      Social History     Socioeconomic History     Marital status:       Spouse name: Not on file     Number of children: Not on file     Years of education: Not on file     Highest education level: Not on file   Occupational History     Not on file   Social Needs     Financial resource strain: Not on file     Food insecurity     Worry: Not on file     Inability: Not on file     Transportation needs     Medical: Not on file     Non-medical: Not on file   Tobacco Use     Smoking status: Former Smoker     Types: Cigarettes     Quit date:      Years since quittin.1     Smokeless tobacco: Never Used   Substance and Sexual Activity     Alcohol use: Not on file     Drug use: Not on file     Sexual activity: Not on file   Lifestyle     Physical activity     Days per week: Not on file     Minutes per session: Not on file     Stress: Not on file   Relationships     Social connections     Talks on phone: Not on file     Gets together: Not on file     Attends [employer] service: Not on file     Active member of club or organization: Not on file     Attends meetings of clubs or organizations: Not on file     Relationship status: Not on file     Intimate partner violence     Fear of current or ex partner: Not on file     Emotionally abused: Not on file     Physically abused: Not on file     Forced sexual activity: Not on file   Other Topics Concern     Not on file   Social History Narrative     Not on file      Current Outpatient Medications   Medication Sig Dispense Refill     amoxicillin-clavulanate (AUGMENTIN) 500-125 mg per tablet TAKE 1 TABLET BY MOUTH EVERY DAY 90 tablet 3     cetirizine (ZYRTEC) 10 mg cap        FLUAD QUAD 2020-21,65Y UP,,PF, 60 mcg (15 mcg x 4)/0.5 mL Syrg TO BE ADMINISTERED BY PHARMACIST FOR IMMUNIZATION       No current facility-administered medications for this visit.       Objective:   Vital Signs:   Visit Vitals  /76 (Patient Site: Right Arm, Patient Position: Sitting, Cuff Size: Adult Regular)   Pulse 70   Temp 97.2  F (36.2  C) (Temporal)   Resp 16   Ht  "5' 4.5\" (1.638 m)   Wt 157 lb (71.2 kg)   LMP 02/17/2001   BMI 26.53 kg/m           VisionScreening:  No exam data present     PHYSICAL EXAM  EXAM:  /76 (Patient Site: Right Arm, Patient Position: Sitting, Cuff Size: Adult Regular)   Pulse 70   Temp 97.2  F (36.2  C) (Temporal)   Resp 16   Ht 5' 4.5\" (1.638 m)   Wt 157 lb (71.2 kg)   LMP 02/17/2001   BMI 26.53 kg/m     Gen:  NAD, appears well, well-hydrated  HEENT:  TMs nl, oropharynx benign, nasal mucosa nl, conjunctiva clear  Neck:  Supple, no adenopathy, no thyromegaly, no carotid bruits, no JVD  Lungs:  Clear to auscultation bilaterally  Breast exam:  No breast lumps, no skin changes, no nipple discharge, no axillary adenopathy  Cor:  RRR no murmur  Abd:  Soft, nontender, BS+, no masses, no guarding or rebound, no HSM  Extr:  Neg.  Neuro:  No asymmetry  Skin:  Warm/dry        Assessment Results 1/27/2021   Activities of Daily Living No help needed   Instrumental Activities of Daily Living No help needed   Mini Cog Total Score 5   Some recent data might be hidden     A Mini-Cog score of 0-2 suggests the possibility of dementia, score of 3-5 suggests no dementia    Identified Health Risks:     The patient was provided with written information regarding signs of hearing loss.  Information on urinary incontinence and treatment options given to patient.  She is at risk for falling and has been provided with information to reduce the risk of falling at home.  Patient's advanced directive was discussed and I am comfortable with the patient's wishes.        "

## 2021-06-14 NOTE — PROGRESS NOTES
Chief Complaint:  Chief Complaint   Patient presents with     Urinary Tract Infection     sharp pain when she urinates started yesterday     HPI:   Cliff Dai is a 67 y.o. female concern for UTI.  She has had dysuria since last night, sharp pains.  No blood in her urine.  No fevers.  She has a history of chronic UTIs.  She had been taking half a dose of an Augmentin pill daily for antibiotic prophylaxis.  She had been doing better for some time, so she stopped the Augmentin.  She then got a UTI in September.  Since then, she has been taking the Augmentin daily again.  She thinks she may have a prolapsed bladder.  Not too bothersome at this point, no significant urinary incontinence.  She also had an episode of diarrhea yesterday.    Patient Active Problem List   Diagnosis     Menopause     Edema     Sjogren's Syndrome     Osteoporosis     Excessive Thirst     Cystocele     Female Stress Incontinence     Postmenopausal Atrophic Vaginitis     Urticaria     Peripheral neuropathy     Sciatica of right side     Hives     Cervicalgia     Osteoarthritis of both shoulders     Inflammation of joint of shoulder region     Arthralgia of hip     History of artificial joint     Osteoarthritis of hip       Current Outpatient Prescriptions:      loratadine (CLARITIN) 10 mg tablet, Take 1 tablet (10 mg total) by mouth daily., Disp: 30 tablet, Rfl: 0     amoxicillin-clavulanate (AUGMENTIN) 500-125 mg per tablet, Take 0.5 tablets (250 mg total) by mouth daily., Disp: 15 tablet, Rfl: 0     sulfamethoxazole-trimethoprim (SEPTRA DS) 800-160 mg per tablet, Take 1 tablet by mouth 2 (two) times a day for 7 days., Disp: 14 tablet, Rfl: 0    Objective:  Allergies:  Allergies   Allergen Reactions     Cephalexin Itching       Vitals:    12/19/17 1448   BP: 100/52   Pulse: 84   Resp: 20     There is no height or weight on file to calculate BMI.    Vital signs reviewed  General: Patient is alert and oriented x 3, in no apparent  distress  Cardiac: Regular rate and rhythm, no murmurs  Lungs: Clear to auscultation bilaterally, No crackles, rales, rhonchi, or wheezing noted  Abdomen: No suprapubic tenderness, no CVA tenderness bilaterally     Results for orders placed or performed in visit on 12/19/17   Urinalysis   Result Value Ref Range    Color, UA Yellow Colorless, Yellow, Straw, Light Yellow    Clarity, UA Clear Clear    Glucose, UA Negative Negative    Bilirubin, UA Negative Negative    Ketones, UA 40 mg/dL (!) Negative    Specific Gravity, UA 1.025 1.005 - 1.030    Blood, UA Trace (!) Negative    pH, UA 6.0 5.0 - 8.0    Protein, UA Negative Negative mg/dL    Urobilinogen, UA 0.2 E.U./dL 0.2 E.U./dL, 1.0 E.U./dL    Nitrite, UA Negative Negative    Leukocytes, UA Small (!) Negative    Bacteria, UA Few (!) None Seen hpf    RBC, UA 3-5 (!) None Seen, 0-2 hpf    WBC, UA 5-10 (!) None Seen, 0-5 hpf    Squam Epithel, UA 5-10 (!) None Seen, 0-5 lpf     Urine culture is pending.    Assessment and Plan:  Dysuria, probable UTI.  History of recurrent UTIs.  Last UTI was in September.  Patient is leaving for [place] at the end of this week for a two-week vacation.  Prescription was sent today for Bactrim ×7 days.  If patient notices complete improvement by day 5, she could stop antibiotic a few days early if desired.  I will follow-up with urine culture.  I advised her to seek medical care on her vacation if her symptoms worsen.    This dictation uses voice recognition software, which may contain typographical errors.

## 2021-06-15 NOTE — TELEPHONE ENCOUNTER
Triage call:    Patient states she is very dizzy, unable to walk normally. Started a couple hours ago.   Pt also vomiting. Pt states the dizziness started first, main sx.   Patient took meclizine, no relief.   HR: 84, O2- 99%  Headache that patient rates as mild.  Pt feels like she is hyperventilating.     Pt was advised of protocol recommendation/disposition of ED now.  Patient's  will take her to to the ED.    Latasha Rivas RN 03/02/21 11:13 AM  [facility] Nurse Advisor      COVID 19 Nurse Triage Plan/Patient Instructions    Please be aware that novel coronavirus (COVID-19) may be circulating in the community. If you develop symptoms such as fever, cough, or SOB or if you have concerns about the presence of another infection including coronavirus (COVID-19), please contact your health care provider or visit [email or web address].     Disposition/Instructions    ED Visit recommended. Follow protocol based instructions.      Bring Your Own Device:  Please also bring your smart device(s) (smart phones, tablets, laptops) and their charging cables for your personal use and to communicate with your care team during your visit.      Thank you for taking steps to prevent the spread of this virus.  o Limit your contact with others.  o Wear a simple mask to cover your cough.  o Wash your hands well and often.    Resources    [facility]: About COVID-19: [email or web address]    CDC: What to Do If You're Sick: www.cdc.gov/coronavirus/2019-ncov/about/steps-when-sick.html    CDC: Ending Home Isolation: www.cdc.gov/coronavirus/2019-ncov/hcp/disposition-in-home-patients.html     CDC: Caring for Someone: www.cdc.gov/coronavirus/2019-ncov/if-you-are-sick/care-for-someone.html     [facility]: Interim Guidance for Hospital Discharge to Home: www.health.[facility].[place]/diseases/coronavirus/hcp/hospdischarge.pdf    [facility] clinical trials (COVID-19 research studies):  clinicalaffairs.[facility]-clinical-trials     Below are the COVID-19 hotlines at the [place] Department of Health ([facility]). Interpreters are available.   o For health questions: Call 882-508-2987 or 1-383.986.7470 (7 a.m. to 7 p.m.)  o For questions about schools and childcare: Call 126-560-0190 or 1-326.725.5938 (7 a.m. to 7 p.m.)     Reason for Disposition    Lightheadedness or dizziness and persists > 10 minutes and not relieved by reassurance provided by triager    Difficulty breathing and persists > 10 minutes and not relieved by reassurance provided by triager    Additional Information    Negative: Fainted, and still feels dizzy afterwards    Negative: SEVERE difficulty breathing (e.g., struggling for each breath, speaks in single words)    Negative: Overdose (accidental or intentional) of medications    Negative: New neurologic deficit that is present now: * Weakness of the face, arm, or leg on one side of the body * Numbness of the face, arm, or leg on one side of the body * Loss of speech or garbled speech    Negative: Sounds like a life-threatening emergency to the triager    Negative: Chest pain    Negative: Rectal bleeding, bloody stool, or tarry-black stool    Negative: Vomiting is the main symptom    Negative: Diarrhea is the main symptom    Negative: Headache is the main symptom    Negative: Heat exhaustion suspected (i.e., dehydration from heat exposure)    Negative: Patient states that he/she is having an anxiety/panic attack    SEVERE dizziness (e.g., unable to stand, requires support to walk, feels like passing out now)    Negative: Difficult to awaken or acting confused (e.g., disoriented, slurred speech)    Negative: Shock suspected (e.g., cold/pale/clammy skin, too weak to stand, low BP, rapid pulse)    Negative: Heart beating < 50 beats per minute OR > 140 beats per minute    Negative: SEVERE difficulty breathing (e.g., struggling for each breath, speaks in single words)    Negative: Bluish  (or gray) lips or face    Negative: Difficult to awaken or acting confused  (e.g., disoriented, slurred speech)    Negative: Hysterical or combative behavior    Negative: Sounds like a life-threatening emergency to the triager    Negative: Chest pain    Negative: Palpitations, skipped heart beat, or rapid heart beat    Negative: Cough is main symptom    Negative: Suicide thoughts, threats, attempts, or questions    Negative: Depression is main problem or symptom (e.g., feelings of sadness or hopelessness)    Protocols used: ANXIETY AND PANIC ATTACK-A-OH, DIZZINESS-A-OH

## 2021-06-15 NOTE — TELEPHONE ENCOUNTER
Triage call:   Patient calling back from previous triage call- reviewed for the purposes of this encounter   - states that her  and her are not safe to get out of the house and she needs additional assistance  Patient is very dizzy and not able to walk on her own and is vomiting.   Advised that since patient needs to be evaluated urgently that she should call 911 for additional assistance now. Patient agrees to do so now.     Shantel Guevara RN [facility] Triage/Med Refill 3/2/2021 12:07 PM      Additional Information    Negative: Shock suspected (e.g., cold/pale/clammy skin, too weak to stand, low BP, rapid pulse)    Negative: Difficult to awaken or acting confused (e.g., disoriented, slurred speech)    Negative: Fainted, and still feels dizzy afterwards    Negative: SEVERE difficulty breathing (e.g., struggling for each breath, speaks in single words)    Negative: Overdose (accidental or intentional) of medications    Negative: New neurologic deficit that is present now: * Weakness of the face, arm, or leg on one side of the body * Numbness of the face, arm, or leg on one side of the body * Loss of speech or garbled speech    Negative: Heart beating < 50 beats per minute OR > 140 beats per minute    Negative: Sounds like a life-threatening emergency to the triager    Negative: Chest pain    Negative: Rectal bleeding, bloody stool, or tarry-black stool    Negative: Vomiting is the main symptom    Negative: Diarrhea is the main symptom    Negative: Headache is the main symptom    Negative: Heat exhaustion suspected (i.e., dehydration from heat exposure)    Negative: Patient states that he/she is having an anxiety/panic attack    SEVERE dizziness (e.g., unable to stand, requires support to walk, feels like passing out now)     Patient was advised on previous call to be seen in the ER today - unable to get herself there.    Protocols used: DIZZINESS-A-OH

## 2021-06-15 NOTE — PROGRESS NOTES
ASSESSMENT/PLAN:  1. Vertigo     2. Hospital discharge follow-up         This is a 69 yo female here for hospital follow up  1.  She was admitted with severe vertigo symptoms - she is suspicious it has to do with her COVID-19 vaccine (started 2-1/2 weeks after her first dose).  I doubt this is the cause.  Other workup was negative.  Patient is improving  I have reviewed available hospital records, consults, notes, discharge summary as well as imaging and lab results.  In addition I have reviewed her medication list and made any adjustments as indicated in orders.    Post Discharge Medication Reconciliation Status: discharge medications reconciled, continue medications without change    Return in about 1 month (around 4/16/2021) for if not getting better.      There are no discontinued medications.  There are no Patient Instructions on file for this visit.    Chief Complaint:  Chief Complaint   Patient presents with     Hospital Visit Follow Up       HPI:   Cliff Dai is a 70 y.o. female c/o  Went to hospital via paramedics March 2-4 -   Had balance issue - never any spinning  Got up and worked out lightly  Getting breakfast ready - was listing to right -   Got up and had to hang on to counter and got to couch  Took Meclizine - then throwing up and throwing up - called and was told to go to hospital  No stroke  2-1/2 weeks after Moderna #1 -   Due for dose #2 on 3/11/2021 - didn't do it  Will get Moderna #2, 3/27/2021      [facility] is a brain villa - so he's got her on brain restorative powder and mushrooms that offer neurological protection - doing exercises - got rid of walker -   Brother recently retired from brief career as home health aide -     No therapy post hospital - doing that with chiropractor      PMH:   Patient Active Problem List    Diagnosis Date Noted     Vertigo 03/02/2021     Hypokalemia      Hyperglycemia      Skin lesion 07/22/2019     Dermatitis 07/17/2018     Vitamin D deficiency 07/17/2018      Greater trochanteric bursitis of left hip 2018     S/P shoulder replacement, left 2018     Arthralgia of hip 2017     Osteoarthritis of hip 2017     History of artificial joint 2017     Osteoarthritis of both shoulders 2017     Inflammation of joint of shoulder region 01/10/2017     Cervicalgia 08/15/2016     Hives 2016     Sciatica of right side 2016     Peripheral neuropathy 2015     Urticaria      Menopause      Edema      Sjogren's Syndrome      Osteoporosis      Excessive Thirst      Cystocele      Female Stress Incontinence      Postmenopausal Atrophic Vaginitis      History reviewed. No pertinent past medical history.  Past Surgical History:   Procedure Laterality Date     HYSTERECTOMY  2004     OOPHORECTOMY Bilateral      RI ANTERIOR COLPORRAPHY RPR CYSTOCELE W/CYSTO      Description: Anterior Colporrhaphy, Repair Of Cystocele;  Recorded: 2008;     RI APPENDECTOMY      Description: Appendectomy;  Recorded: 10/28/2008;     RI REMOVAL GALLBLADDER      Description: Cholecystectomy;  Recorded: 10/28/2008;     RI VAGINAL HYSTERECTOMY,UTERUS 250 GMS/<      Description: Vaginal Hysterectomy;  Recorded: 2012;  Comments: for hypermenorrhea/ prolapse; Dr. Cunningham     Social History     Socioeconomic History     Marital status:      Spouse name: Not on file     Number of children: Not on file     Years of education: Not on file     Highest education level: Not on file   Occupational History     Not on file   Social Needs     Financial resource strain: Not on file     Food insecurity     Worry: Not on file     Inability: Not on file     Transportation needs     Medical: Not on file     Non-medical: Not on file   Tobacco Use     Smoking status: Former Smoker     Types: Cigarettes     Quit date: 1980     Years since quittin.2     Smokeless tobacco: Never Used   Substance and Sexual Activity     Alcohol use: Not on file     Drug use: Not  "on file     Sexual activity: Not on file   Lifestyle     Physical activity     Days per week: Not on file     Minutes per session: Not on file     Stress: Not on file   Relationships     Social connections     Talks on phone: Not on file     Gets together: Not on file     Attends Scientologist service: Not on file     Active member of club or organization: Not on file     Attends meetings of clubs or organizations: Not on file     Relationship status: Not on file     Intimate partner violence     Fear of current or ex partner: Not on file     Emotionally abused: Not on file     Physically abused: Not on file     Forced sexual activity: Not on file   Other Topics Concern     Not on file   Social History Narrative     Not on file     Family History   Problem Relation Age of Onset     Breast cancer Mother 57     Breast cancer Maternal Grandmother         Unsure of age       Meds:    Current Outpatient Medications:      amoxicillin-clavulanate (AUGMENTIN) 500-125 mg per tablet, TAKE 1 TABLET BY MOUTH EVERY DAY, Disp: 90 tablet, Rfl: 3     cetirizine (ZYRTEC) 10 MG tablet, Take 10 mg by mouth daily. , Disp: , Rfl:      cholecalciferol, vitamin D3, 1,000 unit (25 mcg) tablet, Take 1,000 Units by mouth daily., Disp: , Rfl:      meclizine (ANTIVERT) 25 mg tablet, Take 1 tab po three times a day for 2 days and then take 1 tab po every 8 hours prn dizziness, Disp: 14 tablet, Rfl: 0    Allergies:  Allergies   Allergen Reactions     Other Allergy (See Comments)      PN: LW CM1: >>> NO CONTRAST ADVERSE REACTION <<<     Reaction :     Cephalexin Itching       ROS:  Pertinent positives as noted in HPI; otherwise 12 point ROS negative.      Physical Exam:  EXAM:  /72 (Patient Site: Right Arm, Patient Position: Sitting, Cuff Size: Adult Regular)   Pulse 64   Temp 97.6  F (36.4  C) (Temporal)   Ht 5' 4.5\" (1.638 m)   Wt 158 lb 8 oz (71.9 kg)   LMP 02/17/2001   BMI 26.79 kg/m     Gen:  NAD, appears well, well-hydrated  HEENT:  " TMs nl, oropharynx benign, nasal mucosa nl, conjunctiva clear, no nystagmus, EOMI  Neck:  Supple, no adenopathy, no thyromegaly, no carotid bruits, no JVD,   Lungs:  Clear to auscultation bilaterally  Cor:  RRR no murmur  Abd:  Soft, nontender, BS+, no masses, no guarding or rebound, no HSM  Extr:  Neg.  Neuro:  No asymmetry, Nl motor tone/strength, nl sensation, reflexes =, gait nl, nl coordination, CN intact, neg Romberg  Skin:  Warm/dry        Results:  Results for orders placed or performed during the hospital encounter of 03/02/21   INR   Result Value Ref Range    INR 1.10 0.90 - 1.10   APTT(PTT)   Result Value Ref Range    PTT 25 24 - 37 seconds   Basic Metabolic Panel   Result Value Ref Range    Sodium 140 136 - 145 mmol/L    Potassium 3.4 (L) 3.5 - 5.0 mmol/L    Chloride 105 98 - 107 mmol/L    CO2 19 (L) 22 - 31 mmol/L    Anion Gap, Calculation 16 5 - 18 mmol/L    Glucose 152 (H) 70 - 125 mg/dL    Calcium 8.9 8.5 - 10.5 mg/dL    BUN 22 8 - 28 mg/dL    Creatinine 0.64 0.60 - 1.10 mg/dL    GFR MDRD Af Amer >60 >60 mL/min/1.73m2    GFR MDRD Non Af Amer >60 >60 mL/min/1.73m2   HM2(CBC w/o Differential)   Result Value Ref Range    WBC 6.8 4.0 - 11.0 thou/uL    RBC 4.02 3.80 - 5.40 mill/uL    Hemoglobin 13.1 12.0 - 16.0 g/dL    Hematocrit 38.3 35.0 - 47.0 %    MCV 95 80 - 100 fL    MCH 32.6 27.0 - 34.0 pg    MCHC 34.2 32.0 - 36.0 g/dL    RDW 12.1 11.0 - 14.5 %    Platelets 263 140 - 440 thou/uL    MPV 10.0 8.5 - 12.5 fL   Troponin I   Result Value Ref Range    Troponin I <0.01 0.00 - 0.29 ng/mL   Asymptomatic SARS-CoV-2 (COVID-19)-PCR    Specimen: Respiratory   Result Value Ref Range    SARS-CoV-2 PCR Result Negative Negative, Invalid   Magnesium   Result Value Ref Range    Magnesium 2.0 1.8 - 2.6 mg/dL   Thyroid Cascade   Result Value Ref Range    TSH 1.69 0.30 - 5.00 uIU/mL   Potassium   Result Value Ref Range    Potassium 4.8 3.5 - 5.0 mmol/L   HM2(CBC w/o Differential)   Result Value Ref Range    WBC 9.0 4.0 -  11.0 thou/uL    RBC 3.67 (L) 3.80 - 5.40 mill/uL    Hemoglobin 12.0 12.0 - 16.0 g/dL    Hematocrit 36.3 35.0 - 47.0 %    MCV 99 80 - 100 fL    MCH 32.7 27.0 - 34.0 pg    MCHC 33.1 32.0 - 36.0 g/dL    RDW 12.5 11.0 - 14.5 %    Platelets 252 140 - 440 thou/uL    MPV 9.9 8.5 - 12.5 fL   Comprehensive Metabolic Panel   Result Value Ref Range    Sodium 141 136 - 145 mmol/L    Potassium 4.6 3.5 - 5.0 mmol/L    Chloride 110 (H) 98 - 107 mmol/L    CO2 27 22 - 31 mmol/L    Anion Gap, Calculation 4 (L) 5 - 18 mmol/L    Glucose 98 70 - 125 mg/dL    BUN 15 8 - 28 mg/dL    Creatinine 0.63 0.60 - 1.10 mg/dL    GFR MDRD Af Amer >60 >60 mL/min/1.73m2    GFR MDRD Non Af Amer >60 >60 mL/min/1.73m2    Bilirubin, Total 0.4 0.0 - 1.0 mg/dL    Calcium 8.6 8.5 - 10.5 mg/dL    Protein, Total 6.1 6.0 - 8.0 g/dL    Albumin 3.7 3.5 - 5.0 g/dL    Alkaline Phosphatase 75 45 - 120 U/L    AST 17 0 - 40 U/L    ALT 15 0 - 45 U/L   Potassium - Next AM   Result Value Ref Range    Potassium 3.8 3.5 - 5.0 mmol/L   Basic Metabolic Panel   Result Value Ref Range    Sodium 137 136 - 145 mmol/L    Potassium 3.8 3.5 - 5.0 mmol/L    Chloride 107 98 - 107 mmol/L    CO2 25 22 - 31 mmol/L    Anion Gap, Calculation 5 5 - 18 mmol/L    Glucose 97 70 - 125 mg/dL    Calcium 8.6 8.5 - 10.5 mg/dL    BUN 11 8 - 28 mg/dL    Creatinine 0.55 (L) 0.60 - 1.10 mg/dL    GFR MDRD Af Amer >60 >60 mL/min/1.73m2    GFR MDRD Non Af Amer >60 >60 mL/min/1.73m2   POCT CREATININE   Result Value Ref Range    iSTAT Creatinine 0.4 (L) 0.6 - 1.1 mg/dL    iSTAT GFR MDRD Af Amer >60 >60 mL/min/1.73m2    iSTAT GFR MDRD Non Af Amer >60 >60 mL/min/1.73m2   ECG 12 lead nursing unit performed   Result Value Ref Range    SYSTOLIC BLOOD PRESSURE 119 mmHg    DIASTOLIC BLOOD PRESSURE 72 mmHg    VENTRICULAR RATE 72 BPM    ATRIAL RATE 72 BPM    P-R INTERVAL 200 ms    QRS DURATION 98 ms    Q-T INTERVAL 438 ms    QTC CALCULATION (BEZET) 479 ms    P Axis 64 degrees    R AXIS 48 degrees    T AXIS 51  degrees    MUSE DIAGNOSIS       Normal sinus rhythm  Nonspecific T wave abnormality  Prolonged QT  Abnormal ECG  When compared with ECG of 18-JAN-2017 14:12,  No significant change was found  Confirmed by SEE ED PROVIDER NOTE FOR, ECG INTERPRETATION ([id number]),  ALISA MENARD ([id number]) on 3/2/2021 2:15:14 PM     ECG 12 lead MUSE   Result Value Ref Range    SYSTOLIC BLOOD PRESSURE      DIASTOLIC BLOOD PRESSURE      VENTRICULAR RATE 75 BPM    ATRIAL RATE 75 BPM    P-R INTERVAL 176 ms    QRS DURATION 94 ms    Q-T INTERVAL 400 ms    QTC CALCULATION (BEZET) 446 ms    P Axis 71 degrees    R AXIS 42 degrees    T AXIS 55 degrees    MUSE DIAGNOSIS       Sinus rhythm with Premature atrial complexes  Otherwise normal ECG  When compared with ECG of 02-MAR-2021 14:11,  Premature atrial complexes are now Present  Nonspecific T wave abnormality no longer evident in Anterior leads  Confirmed by MASSIMO GIMENEZ MD LOC:[facility] ([id number]) on 3/3/2021 12:07:53 PM     Echo Complete   Result Value Ref Range    LV volume diastolic 51 46.0 - 106.0 cm3    LV volume systolic 17 14.0 - 42.0 cm3    HR 76 bpm    IVSd 1.01 (!) 0.6 - 0.9 cm    LVIDd 4.25 3.8 - 5.2 cm    LVIDs 2.91 2.2 - 3.5 cm    LVOT diam 2.3 cm    LVOT mean gradient 2 mmHg    LVOT peak VTI 20.7 cm    LVOT mean nava 61.6 cm/s    LVOT peak nava 96 cm/s    LVOT peak gradient 4 mmHg    LV PWd 0.959 (!) 0.6 - 0.9 cm    MV E' lat nava 10.6 cm/s    MV E' med nava 8.27 cm/s    AV mean nava 79.8 cm/s    AV mean gradient 3 mmHg    AV VTI 25.5 cm    AV peak nava 118 cm/s    AO root 3.1 cm    AO ascending 3 cm    MV decel slope 3,400 mm/s2    MV decel slope 3,400 mm/s2    MV decel time 195 ms    MV P 1/2 time 57 ms    MV peak A nava 60.4 cm/s    MV peak E nava 66.4 cm/s    BSA 1.8 m2    Hieght 64.6 in    Weight 2,504 lbs    /62 mmHg    IVS/PW ratio 1.1     LV FS 31.5 28.0 - 44.0 %    Echo LVEF calculated 67 55 - 75 %    LV mass 136.8 g    AV area 3.4 cm2    AV DIM IND nava 0.8     MV area p 1/2  time 3.9 cm2    MV E/A Ratio 1.1     LVOT area 4.15 cm2    LVOT SV 86.0 cm3    AV peak gradient 5.6 mmHg    LV systolic volume index 9.4 8.0 - 24.0 cm3/m2    LV diastolic volume index 28.3 29.0 - 61.0 cm3/m2    LV mass index 76.0 g/m2    LV SVi 47.8 ml/m2    MV med E/e' ratio 8.0     MV lat E/e' ratio 6.3     LV CO 6.5 l/min    LV Ci 3.6 l/min/m2    Height 64.6 in    Weight 157 lbs    MV Avg E/e' Ratio 7.0 cm/s    AV DIM IND VTI 0.8

## 2021-06-17 DIAGNOSIS — M25.511 RIGHT SHOULDER PAIN, UNSPECIFIED CHRONICITY: Primary | ICD-10-CM

## 2021-06-17 NOTE — PATIENT INSTRUCTIONS - HE
Patient Instructions by Hyun Mcgovern MD at 1/6/2020 11:45 AM     Author: Hyun Mcgovern MD Service: -- Author Type: Physician    Filed: 1/6/2020 12:26 PM Encounter Date: 1/6/2020 Status: Signed    : Hyun Mcgovern MD (Physician)         Patient Education   Signs of Hearing Loss  Hearing loss is a problem shared by many people. In fact, it is one of the most common health conditions, particularly as people age. Most people over age 65 have some hearing loss, and by age 80, almost everyone does. Because hearing loss usually occurs slowly over the years, you may not realize your hearing ability has gotten worse.       Have your hearing checked  Contact your [facility] care provider if you:    Have to strain to hear normal conversation.    Have to watch other peoples faces very carefully to follow what theyre saying.    Need to ask people to repeat what theyve said.    Often misunderstand what people are saying.    Turn the volume of the television or radio up so high that others complain.    Feel that people are mumbling when theyre talking to you.    Find that the effort to hear leaves you feeling tired and irritated.    Notice, when using the phone, that you hear better with 1 ear than the other.    9792-3997 [employer]. [address] [place], [place] [id number]. All rights reserved. This information is not intended as a substitute for professional medical care. Always follow your healthcare professional's instructions.         Patient Education   Urinary Incontinence, Female (Adult)  Urinary incontinence means loss of control of the bladder. This problem affects many women, especially as they get older. If you have incontinence, you may be embarrassed to ask for help. But know that this problem can be treated.  Types of Incontinence  There are different types of incontinence. Two of the main types are described here. You can have more than one  type.    Stress incontinence. With this type, urine leaks when pressure (stress) is put on the bladder. This may happen when you cough, sneeze, or laugh. Stress incontinence most often occurs because the pelvic floor muscles that support the bladder and urethra are weak. This can happen after pregnancy and vaginal childbirth or a hysterectomy. It can also be due to excess body weight or hormone changes.    Urge incontinence (also called overactive bladder). With this type, a sudden urge to urinate is felt often. This may happen even though there may not be much urine in the bladder. The need to urinate often during the night is common. Urge incontinence most often occurs because of bladder spasms. This may be due to bladder irritation or infection. Damage to bladder nerves or pelvic muscles, constipation, and certain medicines can also lead to urge incontinence.  Treatment of urinary incontinence depends on the cause. Further evaluation is needed to find the type you have. This will likely include an exam and certain tests. Based on the results, you and your healthcare provider can then plan treatment. Until a diagnosis is made, the home care tips below can help relieve symptoms.  Home care    Do pelvic floor muscle exercises, if they are prescribed. The pelvic floor muscles help support the bladder and urethra. Many women find that their symptoms improve when doing special exercises that strengthen these muscles. To do the exercises contract the muscles you would use to stop your stream of urine, but do this when youre not urinating. Hold for 10 seconds, then relax. Repeat 10 to 20 times in a row, at least 3 times a day. Your provider may give you other instructions for how to do the exercises and how often.    Keep a bladder diary. This helps track how often and how much you urinate over a set period of time. Bring this diary with you to your next visit with the provider. The information can help your provider  learn more about your bladder problem.    Lose weight, if advised to by your provider. Excess weight puts pressure on the bladder. Your provider can help you create a weight-loss plan thats right for you. This may include exercising more and making certain diet changes.    Don't consume foods and drinks that may irritate the bladder. These can include alcohol and caffeinated drinks.    Quit smoking. Smoking and other tobacco use can lead to chronic cough that strains the pelvic floor muscles. Smoking may also damage the bladder and urethra. Talk with your provider about treatments or methods you can use to quit smoking.    If drinking large amounts of fluid causes you to have symptoms, you may be advised to limit your fluid intake. You may also be advised to drink most of your fluids during the day and to limit fluids at night.    If youre worried about urine leakage or accidents, you may wear absorbent pads to catch urine. Change the pads often. This helps reduce discomfort. It may also reduce the risk of skin or bladder infections.  Follow-up care  Follow up with your healthcare provider, or as directed. It may take some to find the right treatment for your problem. Your treatment plan may include special therapies or medicines. Certain procedures or surgery may also be options. Be sure to discuss any questions you have with your provider.  When to seek medical advice  Call the healthcare provider right away if any of these occur:    Fever of 100.4 F (38 C) or higher, or as directed by your provider    Bladder pain or fullness    Abdominal swelling    Nausea or vomiting    Back pain    Weakness, dizziness or fainting  Date Last Reviewed: 10/1/2017    [id number] [employer]. [address] [place] [address], [place] [address]. All rights reserved. This information is not intended as a substitute for professional medical care. Always follow your healthcare professional's instructions.     Patient Education    Preventing Falls in the Home  As you get older, falls are more likely. Thats because your reaction time slows. Your muscles and joints may also get stiffer, making them less flexible. Illness, medications, and vision changes can also affect your balance. A fall could leave you unable to live on your own. To make your home safer, follow these tips:    Floors    Put nonskid pads under area rugs.    Remove throw rugs.    Replace worn floor coverings.    Tack carpets firmly to each step on carpeted stairs. Put nonskid strips on the edges of uncarpeted stairs.    Keep floors and stairs free of clutter and cords.    Arrange furniture so there are clear pathways.    Clean up any spills right away.    Bathrooms    Install grab bars in the tub or shower.    Apply nonskid strips or put a nonskid rubber mat in the tub or shower.    Sit on a bath chair to bathe.    Use bathmats with nonskid backing.    Lighting    Keep a flashlight in each room.    Put a nightlight along the pathway between the bedroom and the bathroom.    8714-4834 [employer]. [address] [place] [address]. All rights reserved. This information is not intended as a substitute for professional medical care. Always follow your healthcare professional's instructions.           Advance Directive  Patients advance directive was discussed and I am comfortable with the patients wishes.  Patient Education   Personalized Prevention Plan  You are due for the preventive services outlined below.  Your care team is available to assist you in scheduling these services.  If you have already completed any of these items, please share that information with your care team to update in your medical record.  Health Maintenance   Topic Date Due   ? HEPATITIS C SCREENING  1950   ? MEDICARE ANNUAL WELLNESS VISIT  07/17/2019   ? DXA SCAN  08/16/2019   ? COLONOSCOPY  09/01/2019   ? FALL RISK ASSESSMENT  07/22/2020   ? MAMMOGRAM  06/06/2021   ? LIPID   07/17/2023   ? TD 18+ HE  11/18/2023   ? ADVANCE CARE PLANNING  07/22/2024   ? PNEUMOCOCCAL IMMUNIZATION 65+ LOW/MEDIUM RISK  Completed   ? INFLUENZA VACCINE RULE BASED  Completed   ? ZOSTER VACCINES  Completed

## 2021-06-17 NOTE — PROGRESS NOTES
ASSESSMENT/PLAN:  1. Vertigo         This is a 71 yo female with:  1.  Vertigo - this has improved - not taking any medications now.  Has continued to do some chiropractic assigned exercises - feels like this is working for her.No further acute intervention.    Return in about 3 months (around 7/27/2021) for Recheck.      There are no discontinued medications.  There are no Patient Instructions on file for this visit.    Chief Complaint:  Chief Complaint   Patient presents with     Follow-up       HPI:   Cliff Dai is a 70 y.o. female c/o  dizzyness is better  Meclizine - not using any more  Doing some of the chiropractic exercises    Did get her 2nd COVID shot - is waiting for symptoms related to that     Working part-time        PMH:   Patient Active Problem List    Diagnosis Date Noted     Vertigo 03/02/2021     Hypokalemia      Hyperglycemia      Skin lesion 07/22/2019     Dermatitis 07/17/2018     Vitamin D deficiency 07/17/2018     Greater trochanteric bursitis of left hip 05/01/2018     S/P shoulder replacement, left 02/06/2018     Arthralgia of hip 05/05/2017     Osteoarthritis of hip 05/05/2017     History of artificial joint 02/01/2017     Osteoarthritis of both shoulders 01/13/2017     Inflammation of joint of shoulder region 01/10/2017     Cervicalgia 08/15/2016     Hives 01/27/2016     Sciatica of right side 01/04/2016     Peripheral neuropathy 07/01/2015     Urticaria      Menopause      Edema      Sjogren's Syndrome      Osteoporosis      Excessive Thirst      Cystocele      Female Stress Incontinence      Postmenopausal Atrophic Vaginitis      No past medical history on file.  Past Surgical History:   Procedure Laterality Date     HYSTERECTOMY  2004     OOPHORECTOMY Bilateral 2004     NM ANTERIOR COLPORRAPHY RPR CYSTOCELE W/CYSTO      Description: Anterior Colporrhaphy, Repair Of Cystocele;  Recorded: 02/25/2008;     NM APPENDECTOMY      Description: Appendectomy;  Recorded: 10/28/2008;     NM  REMOVAL GALLBLADDER      Description: Cholecystectomy;  Recorded: 10/28/2008;     UT VAGINAL HYSTERECTOMY,UTERUS 250 GMS/<      Description: Vaginal Hysterectomy;  Recorded: 2012;  Comments: for hypermenorrhea/ prolapse; Dr. Cunningham     Social History     Socioeconomic History     Marital status:      Spouse name: Not on file     Number of children: Not on file     Years of education: Not on file     Highest education level: Not on file   Occupational History     Not on file   Social Needs     Financial resource strain: Not on file     Food insecurity     Worry: Not on file     Inability: Not on file     Transportation needs     Medical: Not on file     Non-medical: Not on file   Tobacco Use     Smoking status: Former Smoker     Types: Cigarettes     Quit date:      Years since quittin.3     Smokeless tobacco: Never Used   Substance and Sexual Activity     Alcohol use: Not on file     Drug use: Not on file     Sexual activity: Not on file   Lifestyle     Physical activity     Days per week: Not on file     Minutes per session: Not on file     Stress: Not on file   Relationships     Social connections     Talks on phone: Not on file     Gets together: Not on file     Attends Mosque service: Not on file     Active member of club or organization: Not on file     Attends meetings of clubs or organizations: Not on file     Relationship status: Not on file     Intimate partner violence     Fear of current or ex partner: Not on file     Emotionally abused: Not on file     Physically abused: Not on file     Forced sexual activity: Not on file   Other Topics Concern     Not on file   Social History Narrative     Not on file     Family History   Problem Relation Age of Onset     Breast cancer Mother 57     Breast cancer Maternal Grandmother         Unsure of age       Meds:    Current Outpatient Medications:      cetirizine (ZYRTEC) 10 MG tablet, Take 10 mg by mouth daily. , Disp: , Rfl:       cholecalciferol, vitamin D3, 1,000 unit (25 mcg) tablet, Take 1,000 Units by mouth daily., Disp: , Rfl:      amoxicillin-clavulanate (AUGMENTIN) 500-125 mg per tablet, Take 1 tablet (500 mg total) by mouth daily., Disp: 90 tablet, Rfl: 3    Allergies:  Allergies   Allergen Reactions     Other Allergy (See Comments)      PN: LW CM1: >>> NO CONTRAST ADVERSE REACTION <<<     Reaction :     Cephalexin Itching       ROS:  Pertinent positives as noted in HPI; otherwise 12 point ROS negative.      Physical Exam:  EXAM:  /66 (Patient Site: Right Arm, Patient Position: Sitting, Cuff Size: Adult Small)   Pulse 86   Temp 97.8  F (36.6  C) (Temporal)   Resp 12   LMP 02/17/2001    Gen:  NAD, appears well, well-hydrated  HEENT:  TMs nl, oropharynx benign, nasal mucosa nl, conjunctiva clear  Neck:  Supple, no adenopathy, no thyromegaly, no carotid bruits, no JVD  Lungs:  Clear to auscultation bilaterally  Cor:  RRR no murmur  Abd:  Soft, nontender, BS+, no masses, no guarding or rebound, no HSM  Extr:  Neg.  Neuro:  No asymmetry, Nl motor tone/strength, nl sensation, reflexes =, gait nl, nl coordination, CN intact, nl Romberg  Skin:  Warm/dry        Results:  Results for orders placed or performed during the hospital encounter of 03/02/21   INR   Result Value Ref Range    INR 1.10 0.90 - 1.10   APTT(PTT)   Result Value Ref Range    PTT 25 24 - 37 seconds   Basic Metabolic Panel   Result Value Ref Range    Sodium 140 136 - 145 mmol/L    Potassium 3.4 (L) 3.5 - 5.0 mmol/L    Chloride 105 98 - 107 mmol/L    CO2 19 (L) 22 - 31 mmol/L    Anion Gap, Calculation 16 5 - 18 mmol/L    Glucose 152 (H) 70 - 125 mg/dL    Calcium 8.9 8.5 - 10.5 mg/dL    BUN 22 8 - 28 mg/dL    Creatinine 0.64 0.60 - 1.10 mg/dL    GFR MDRD Af Amer >60 >60 mL/min/1.73m2    GFR MDRD Non Af Amer >60 >60 mL/min/1.73m2   HM2(CBC w/o Differential)   Result Value Ref Range    WBC 6.8 4.0 - 11.0 thou/uL    RBC 4.02 3.80 - 5.40 mill/uL    Hemoglobin 13.1 12.0 -  16.0 g/dL    Hematocrit 38.3 35.0 - 47.0 %    MCV 95 80 - 100 fL    MCH 32.6 27.0 - 34.0 pg    MCHC 34.2 32.0 - 36.0 g/dL    RDW 12.1 11.0 - 14.5 %    Platelets 263 140 - 440 thou/uL    MPV 10.0 8.5 - 12.5 fL   Troponin I   Result Value Ref Range    Troponin I <0.01 0.00 - 0.29 ng/mL   Asymptomatic SARS-CoV-2 (COVID-19)-PCR    Specimen: Respiratory   Result Value Ref Range    SARS-CoV-2 PCR Result Negative Negative, Invalid   Magnesium   Result Value Ref Range    Magnesium 2.0 1.8 - 2.6 mg/dL   Thyroid Cascade   Result Value Ref Range    TSH 1.69 0.30 - 5.00 uIU/mL   Potassium   Result Value Ref Range    Potassium 4.8 3.5 - 5.0 mmol/L   HM2(CBC w/o Differential)   Result Value Ref Range    WBC 9.0 4.0 - 11.0 thou/uL    RBC 3.67 (L) 3.80 - 5.40 mill/uL    Hemoglobin 12.0 12.0 - 16.0 g/dL    Hematocrit 36.3 35.0 - 47.0 %    MCV 99 80 - 100 fL    MCH 32.7 27.0 - 34.0 pg    MCHC 33.1 32.0 - 36.0 g/dL    RDW 12.5 11.0 - 14.5 %    Platelets 252 140 - 440 thou/uL    MPV 9.9 8.5 - 12.5 fL   Comprehensive Metabolic Panel   Result Value Ref Range    Sodium 141 136 - 145 mmol/L    Potassium 4.6 3.5 - 5.0 mmol/L    Chloride 110 (H) 98 - 107 mmol/L    CO2 27 22 - 31 mmol/L    Anion Gap, Calculation 4 (L) 5 - 18 mmol/L    Glucose 98 70 - 125 mg/dL    BUN 15 8 - 28 mg/dL    Creatinine 0.63 0.60 - 1.10 mg/dL    GFR MDRD Af Amer >60 >60 mL/min/1.73m2    GFR MDRD Non Af Amer >60 >60 mL/min/1.73m2    Bilirubin, Total 0.4 0.0 - 1.0 mg/dL    Calcium 8.6 8.5 - 10.5 mg/dL    Protein, Total 6.1 6.0 - 8.0 g/dL    Albumin 3.7 3.5 - 5.0 g/dL    Alkaline Phosphatase 75 45 - 120 U/L    AST 17 0 - 40 U/L    ALT 15 0 - 45 U/L   Potassium - Next AM   Result Value Ref Range    Potassium 3.8 3.5 - 5.0 mmol/L   Basic Metabolic Panel   Result Value Ref Range    Sodium 137 136 - 145 mmol/L    Potassium 3.8 3.5 - 5.0 mmol/L    Chloride 107 98 - 107 mmol/L    CO2 25 22 - 31 mmol/L    Anion Gap, Calculation 5 5 - 18 mmol/L    Glucose 97 70 - 125 mg/dL     Calcium 8.6 8.5 - 10.5 mg/dL    BUN 11 8 - 28 mg/dL    Creatinine 0.55 (L) 0.60 - 1.10 mg/dL    GFR MDRD Af Amer >60 >60 mL/min/1.73m2    GFR MDRD Non Af Amer >60 >60 mL/min/1.73m2   POCT CREATININE   Result Value Ref Range    iSTAT Creatinine 0.4 (L) 0.6 - 1.1 mg/dL    iSTAT GFR MDRD Af Amer >60 >60 mL/min/1.73m2    iSTAT GFR MDRD Non Af Amer >60 >60 mL/min/1.73m2   ECG 12 lead nursing unit performed   Result Value Ref Range    SYSTOLIC BLOOD PRESSURE 119 mmHg    DIASTOLIC BLOOD PRESSURE 72 mmHg    VENTRICULAR RATE 72 BPM    ATRIAL RATE 72 BPM    P-R INTERVAL 200 ms    QRS DURATION 98 ms    Q-T INTERVAL 438 ms    QTC CALCULATION (BEZET) 479 ms    P Axis 64 degrees    R AXIS 48 degrees    T AXIS 51 degrees    MUSE DIAGNOSIS       Normal sinus rhythm  Nonspecific T wave abnormality  Prolonged QT  Abnormal ECG  When compared with ECG of 18-JAN-2017 14:12,  No significant change was found  Confirmed by SEE ED PROVIDER NOTE FOR, ECG INTERPRETATION ([id number]),  ALISA MENARD ([id number]) on 3/2/2021 2:15:14 PM     ECG 12 lead MUSE   Result Value Ref Range    SYSTOLIC BLOOD PRESSURE      DIASTOLIC BLOOD PRESSURE      VENTRICULAR RATE 75 BPM    ATRIAL RATE 75 BPM    P-R INTERVAL 176 ms    QRS DURATION 94 ms    Q-T INTERVAL 400 ms    QTC CALCULATION (BEZET) 446 ms    P Axis 71 degrees    R AXIS 42 degrees    T AXIS 55 degrees    MUSE DIAGNOSIS       Sinus rhythm with Premature atrial complexes  Otherwise normal ECG  When compared with ECG of 02-MAR-2021 14:11,  Premature atrial complexes are now Present  Nonspecific T wave abnormality no longer evident in Anterior leads  Confirmed by PERNELL MARMOLEJO, MASSIMO LOC:WW [id number]) on 3/3/2021 12:07:53 PM     Echo Complete   Result Value Ref Range    LV volume diastolic 51 46.0 - 106.0 cm3    LV volume systolic 17 14.0 - 42.0 cm3    HR 76 bpm    IVSd 1.01 (!) 0.6 - 0.9 cm    LVIDd 4.25 3.8 - 5.2 cm    LVIDs 2.91 2.2 - 3.5 cm    LVOT diam 2.3 cm    LVOT mean gradient 2 mmHg    LVOT peak VTI  20.7 cm    LVOT mean nava 61.6 cm/s    LVOT peak nava 96 cm/s    LVOT peak gradient 4 mmHg    LV PWd 0.959 (!) 0.6 - 0.9 cm    MV E' lat nava 10.6 cm/s    MV E' med nava 8.27 cm/s    AV mean nava 79.8 cm/s    AV mean gradient 3 mmHg    AV VTI 25.5 cm    AV peak nava 118 cm/s    AO root 3.1 cm    AO ascending 3 cm    MV decel slope 3,400 mm/s2    MV decel slope 3,400 mm/s2    MV decel time 195 ms    MV P 1/2 time 57 ms    MV peak A nava 60.4 cm/s    MV peak E nava 66.4 cm/s    BSA 1.8 m2    Hieght 64.6 in    Weight 2,504 lbs    /62 mmHg    IVS/PW ratio 1.1     LV FS 31.5 28.0 - 44.0 %    Echo LVEF calculated 67 55 - 75 %    LV mass 136.8 g    AV area 3.4 cm2    AV DIM IND nava 0.8     MV area p 1/2 time 3.9 cm2    MV E/A Ratio 1.1     LVOT area 4.15 cm2    LVOT SV 86.0 cm3    AV peak gradient 5.6 mmHg    LV systolic volume index 9.4 8.0 - 24.0 cm3/m2    LV diastolic volume index 28.3 29.0 - 61.0 cm3/m2    LV mass index 76.0 g/m2    LV SVi 47.8 ml/m2    MV med E/e' ratio 8.0     MV lat E/e' ratio 6.3     LV CO 6.5 l/min    LV Ci 3.6 l/min/m2    Height 64.6 in    Weight 157 lbs    MV Avg E/e' Ratio 7.0 cm/s    AV DIM IND VTI 0.8

## 2021-06-18 NOTE — PROGRESS NOTES
"ASSESSMENT/PLAN:  1. Bilateral leg pain         This is a 66 yo female with bilateral lower extremity pain - she has hip and leg pain and is already treating this, but feels like legs are \"cold\" and worries about circulation.  Pulses were palpable bilaterally, but ABIs also performed and show SEAN > 1 bilaterally.  Discussed with patient - no sign of vascular flow issue.  I'm more suspicious that this is related to her spine.  She will continue with other treatments.          Medications Discontinued During This Encounter   Medication Reason     loratadine (CLARITIN) 10 mg tablet      amoxicillin-clavulanate (AUGMENTIN) 500-125 mg per tablet      There are no Patient Instructions on file for this visit.    Chief Complaint:  Chief Complaint   Patient presents with     Follow-up     Hip/leg pain       HPI:   Cliff Dai is a 67 y.o. female c/o  Has been in PT for a long time  Left shoulder x 1-1/2 years  Left hip - November 2016 - started hurting - couldn't walk - but, was anticipating shoulder surgery in Feb 2017  In fall 2017, started PT on left hip  Went to [place]    Doing Pilates/core/glute exercises  Not totally better  Saw Dr. Elmore - got a cortisone shot 3 weeks ago  Whole quadrant inflamed x 2 weeks -  Now, calmed down  Didn't help  Now, getting MRI for lower back/hip    Now, wonders about circulation issues  No restless legs  But, feels like she needs weight on her legs    Legs feel throbbing and cold  Took Aleve at 3 a.m.       PMH:   Patient Active Problem List    Diagnosis Date Noted     Arthralgia of hip 05/05/2017     Osteoarthritis of hip 05/05/2017     History of artificial joint 02/01/2017     Osteoarthritis of both shoulders 01/13/2017     Inflammation of joint of shoulder region 01/10/2017     Cervicalgia 08/15/2016     Hives 01/27/2016     Sciatica of right side 01/04/2016     Peripheral neuropathy 07/01/2015     Urticaria      Menopause      Edema      Sjogren's Syndrome      Osteoporosis      " Excessive Thirst      Cystocele      Female Stress Incontinence      Postmenopausal Atrophic Vaginitis      History reviewed. No pertinent past medical history.  Past Surgical History:   Procedure Laterality Date     HYSTERECTOMY  2004     OOPHORECTOMY Bilateral 2004     [facility] ANTERIOR COLPORRAPHY RPR CYSTOCELE W/CYSTO      Description: Anterior Colporrhaphy, Repair Of Cystocele;  Recorded: 02/25/2008;     VA APPENDECTOMY      Description: Appendectomy;  Recorded: 10/28/2008;     VA REMOVAL GALLBLADDER      Description: Cholecystectomy;  Recorded: 10/28/2008;     VA VAGINAL HYSTERECTOMY,UTERUS 250 GMS/<      Description: Vaginal Hysterectomy;  Recorded: 02/25/2012;  Comments: for hypermenorrhea/ prolapse; Dr. Cunningham     Social History     Social History     Marital status:      Spouse name: N/A     Number of children: N/A     Years of education: N/A     Occupational History     Not on file.     Social History Main Topics     Smoking status: Former Smoker     Types: Cigarettes     Smokeless tobacco: Never Used     Alcohol use Not on file     Drug use: Not on file     Sexual activity: Not on file     Other Topics Concern     Not on file     Social History Narrative       Meds:    Current Outpatient Prescriptions:      amoxicillin-clavulanate (AUGMENTIN) 500-125 mg per tablet, , Disp: , Rfl:     Allergies:  Allergies   Allergen Reactions     Cephalexin Itching       ROS:  Pertinent positives as noted in HPI; otherwise 12 point ROS negative.      Physical Exam:  EXAM:  /60 (Patient Site: Right Arm, Patient Position: Sitting, Cuff Size: Adult Regular)  Pulse 78  Temp 98.1  F (36.7  C) (Oral)   Resp 14  Wt 160 lb (72.6 kg)  LMP 02/17/2001  SpO2 95%  BMI 27.25 kg/m2   Gen:  NAD, appears well, well-hydrated  HEENT:  TMs nl, oropharynx benign, nasal mucosa nl, conjunctiva clear  Neck:  Supple, no adenopathy, no thyromegaly, no carotid bruits, no JVD  Lungs:  Clear to auscultation bilaterally  Cor:  RRR no  murmur  Abd:  Soft, nontender, BS+, no masses, no guarding or rebound, no HSM  Extr:  Neg.  Neuro:  No asymmetry  Skin:  Warm/dry        Results:  Results for orders placed or performed in visit on 12/19/17   Culture, Urine   Result Value Ref Range    Culture No Growth    Urinalysis   Result Value Ref Range    Color, UA Yellow Colorless, Yellow, Straw, Light Yellow    Clarity, UA Clear Clear    Glucose, UA Negative Negative    Bilirubin, UA Negative Negative    Ketones, UA 40 mg/dL (!) Negative    Specific Gravity, UA 1.025 1.005 - 1.030    Blood, UA Trace (!) Negative    pH, UA 6.0 5.0 - 8.0    Protein, UA Negative Negative mg/dL    Urobilinogen, UA 0.2 E.U./dL 0.2 E.U./dL, 1.0 E.U./dL    Nitrite, UA Negative Negative    Leukocytes, UA Small (!) Negative    Bacteria, UA Few (!) None Seen hpf    RBC, UA 3-5 (!) None Seen, 0-2 hpf    WBC, UA 5-10 (!) None Seen, 0-5 hpf    Squam Epithel, UA 5-10 (!) None Seen, 0-5 lpf

## 2021-06-18 NOTE — PATIENT INSTRUCTIONS - HE
Patient Instructions by Hyun Mcgovern MD at 1/27/2021  8:00 AM     Author: Hyun Mcgovern MD Service: -- Author Type: Physician    Filed: 1/27/2021  8:12 AM Encounter Date: 1/27/2021 Status: Signed    : Hyun Mcgovern MD (Physician)         Patient Education   Signs of Hearing Loss  Hearing loss is a problem shared by many people. In fact, it is one of the most common health conditions, particularly as people age. Most people over age 65 have some hearing loss, and by age 80, almost everyone does. Because hearing loss usually occurs slowly over the years, you may not realize your hearing ability has gotten worse.       Have your hearing checked  Contact your [facility] care provider if you:    Have to strain to hear normal conversation.    Have to watch other peoples faces very carefully to follow what theyre saying.    Need to ask people to repeat what theyve said.    Often misunderstand what people are saying.    Turn the volume of the television or radio up so high that others complain.    Feel that people are mumbling when theyre talking to you.    Find that the effort to hear leaves you feeling tired and irritated.    Notice, when using the phone, that you hear better with 1 ear than the other.    0889-6849 [employer]. [address] [place] [address]. All rights reserved. This information is not intended as a substitute for professional medical care. Always follow your healthcare professional's instructions.         Patient Education   Urinary Incontinence, Female (Adult)  Urinary incontinence means loss of control of the bladder. This problem affects many women, especially as they get older. If you have incontinence, you may be embarrassed to ask for help. But know that this problem can be treated.  Types of Incontinence  There are different types of incontinence. Two of the main types are described here. You can have more than one  type.    Stress incontinence. With this type, urine leaks when pressure (stress) is put on the bladder. This may happen when you cough, sneeze, or laugh. Stress incontinence most often occurs because the pelvic floor muscles that support the bladder and urethra are weak. This can happen after pregnancy and vaginal childbirth or a hysterectomy. It can also be due to excess body weight or hormone changes.    Urge incontinence (also called overactive bladder). With this type, a sudden urge to urinate is felt often. This may happen even though there may not be much urine in the bladder. The need to urinate often during the night is common. Urge incontinence most often occurs because of bladder spasms. This may be due to bladder irritation or infection. Damage to bladder nerves or pelvic muscles, constipation, and certain medicines can also lead to urge incontinence.  Treatment of urinary incontinence depends on the cause. Further evaluation is needed to find the type you have. This will likely include an exam and certain tests. Based on the results, you and your healthcare provider can then plan treatment. Until a diagnosis is made, the home care tips below can help relieve symptoms.  Home care    Do pelvic floor muscle exercises, if they are prescribed. The pelvic floor muscles help support the bladder and urethra. Many women find that their symptoms improve when doing special exercises that strengthen these muscles. To do the exercises contract the muscles you would use to stop your stream of urine, but do this when youre not urinating. Hold for 10 seconds, then relax. Repeat 10 to 20 times in a row, at least 3 times a day. Your provider may give you other instructions for how to do the exercises and how often.    Keep a bladder diary. This helps track how often and how much you urinate over a set period of time. Bring this diary with you to your next visit with the provider. The information can help your provider  learn more about your bladder problem.    Lose weight, if advised to by your provider. Excess weight puts pressure on the bladder. Your provider can help you create a weight-loss plan thats right for you. This may include exercising more and making certain diet changes.    Don't consume foods and drinks that may irritate the bladder. These can include alcohol and caffeinated drinks.    Quit smoking. Smoking and other tobacco use can lead to chronic cough that strains the pelvic floor muscles. Smoking may also damage the bladder and urethra. Talk with your provider about treatments or methods you can use to quit smoking.    If drinking large amounts of fluid causes you to have symptoms, you may be advised to limit your fluid intake. You may also be advised to drink most of your fluids during the day and to limit fluids at night.    If youre worried about urine leakage or accidents, you may wear absorbent pads to catch urine. Change the pads often. This helps reduce discomfort. It may also reduce the risk of skin or bladder infections.  Follow-up care  Follow up with your healthcare provider, or as directed. It may take some to find the right treatment for your problem. Your treatment plan may include special therapies or medicines. Certain procedures or surgery may also be options. Be sure to discuss any questions you have with your provider.  When to seek medical advice  Call the healthcare provider right away if any of these occur:    Fever of 100.4 F (38 C) or higher, or as directed by your provider    Bladder pain or fullness    Abdominal swelling    Nausea or vomiting    Back pain    Weakness, dizziness or fainting  Date Last Reviewed: 10/1/2017    [id number] [employer]. [address] [place] [address], [place] [address]. All rights reserved. This information is not intended as a substitute for professional medical care. Always follow your healthcare professional's instructions.     Patient Education    Preventing Falls in the Home  As you get older, falls are more likely. Thats because your reaction time slows. Your muscles and joints may also get stiffer, making them less flexible. Illness, medications, and vision changes can also affect your balance. A fall could leave you unable to live on your own. To make your home safer, follow these tips:    Floors    Put nonskid pads under area rugs.    Remove throw rugs.    Replace worn floor coverings.    Tack carpets firmly to each step on carpeted stairs. Put nonskid strips on the edges of uncarpeted stairs.    Keep floors and stairs free of clutter and cords.    Arrange furniture so there are clear pathways.    Clean up any spills right away.    Bathrooms    Install grab bars in the tub or shower.    Apply nonskid strips or put a nonskid rubber mat in the tub or shower.    Sit on a bath chair to bathe.    Use bathmats with nonskid backing.    Lighting    Keep a flashlight in each room.    Put a nightlight along the pathway between the bedroom and the bathroom.    0198-1219 [employer]. [address] [place] [address]. All rights reserved. This information is not intended as a substitute for professional medical care. Always follow your healthcare professional's instructions.           Advance Directive  Patients advance directive was discussed and I am comfortable with the patients wishes.  Patient Education   Personalized Prevention Plan  You are due for the preventive services outlined below.  Your care team is available to assist you in scheduling these services.  If you have already completed any of these items, please share that information with your care team to update in your medical record.  Health Maintenance   Topic Date Due   ? MEDICARE ANNUAL WELLNESS VISIT  01/27/2022   ? FALL RISK ASSESSMENT  01/27/2022   ? MAMMOGRAM  07/10/2022   ? LIPID  07/17/2023   ? TD 18+ HE  11/18/2023   ? ADVANCE CARE PLANNING  01/06/2025   ? COLORECTAL  CANCER SCREENING  12/04/2029   ? DEXA SCAN  01/15/2035   ? HEPATITIS C SCREENING  Completed   ? Pneumococcal Vaccine: 65+ Years  Completed   ? INFLUENZA VACCINE RULE BASED  Completed   ? ZOSTER VACCINES  Completed   ? Pneumococcal Vaccine: Pediatrics (0 to 5 Years) and At-Risk Patients (6 to 64 Years)  Aged Out   ? HEPATITIS B VACCINES  Aged Out

## 2021-06-19 NOTE — PROGRESS NOTES
Assessment and Plan:     1. Routine general medical examination at a health care facility  Lipid Profile    Comprehensive Metabolic Panel    HM1(CBC and Differential)    Thyroid Cascade    HM1 (CBC with Diff)   2. Recurrent UTI  amoxicillin-clavulanate (AUGMENTIN) 500-125 mg per tablet   3. Peripheral neuropathy (H)     4. Dermatitis  predniSONE (DELTASONE) 10 mg tablet    triamcinolone (KENALOG) 0.1 % cream   5. Vitamin D deficiency  Vitamin D, Total (25-Hydroxy)         This is a 67 (nearly 68) year old female here for AWV/physical exam.   1.  Health Maintenance - will check labs as noted.  No need for follow up cervical cancer screening.  Mammogram done April 2018; colonoscopy done September 2009.    2.  H/o Recurrent UTI - continue low dose (generic) Augmentin daily (as recommended by Urology  3.  Peripheral Neuropathy - generally stable.  No new symptoms.  4.  Dermatitis  - has rash on legs - perhaps contact - no sign of shingles  5.  Vitamin D deficiency - check totals    The patient's current medical problems were reviewed.    I have had an Advance Directives discussion with the patient.  The following health maintenance schedule was reviewed with the patient and provided in printed form in the after visit summary:   Health Maintenance   Topic Date Due     INFLUENZA VACCINE RULE BASED (1) 08/01/2018     FALL RISK ASSESSMENT  07/17/2019     DXA SCAN  08/16/2019     COLONOSCOPY  09/01/2019     MAMMOGRAM  04/12/2020     ADVANCE DIRECTIVES DISCUSSED WITH PATIENT  07/17/2023     TD 18+ HE  11/18/2023     PNEUMOCOCCAL POLYSACCHARIDE VACCINE AGE 65 AND OVER  Completed     PNEUMOCOCCAL CONJUGATE VACCINE FOR ADULTS (PCV13 OR PREVNAR)  Completed     ZOSTER VACCINE  Completed        Subjective:   Chief Complaint: Cliff Dai is an 68 y.o. female here for an Annual Wellness visit.   HPI:      Here for AWV/physical  1.  Rash - left buttocks (posterior thigh crease), anterior left thigh and under left breast  Wonder if  she contacted poison ivy while raking at cabin?  2.  Pinches nerve - had MRI - had cortisone at L3 -  Was supposed to help left hip  Has achy/cold/pins and needles in L4 distribution  3.  Travelling to [place] next week  4.  Doing PT for left hip - for a year -  Then, decreased exercise/walking  Seeing Dr. Lynn (neuro chiropractor)   Does brain/balance stuff   Trying to stimulate right hemisphere of brain -         Review of Systems:    Please see above.  The rest of the review of systems are negative for all systems.    Patient Care Team:  Hyun Mcgovern MD as PCP - General     Patient Active Problem List   Diagnosis     Menopause     Edema     Sjogren's Syndrome     Osteoporosis     Excessive Thirst     Cystocele     Female Stress Incontinence     Postmenopausal Atrophic Vaginitis     Urticaria     Peripheral neuropathy (H)     Sciatica of right side     Hives     Cervicalgia     Osteoarthritis of both shoulders     Inflammation of joint of shoulder region     Arthralgia of hip     History of artificial joint     Osteoarthritis of hip     Greater trochanteric bursitis of left hip     S/P shoulder replacement, left     Dermatitis     Vitamin D deficiency     History reviewed. No pertinent past medical history.   Past Surgical History:   Procedure Laterality Date     HYSTERECTOMY  2004     OOPHORECTOMY Bilateral 2004     TN ANTERIOR COLPORRAPHY RPR CYSTOCELE W/CYSTO      Description: Anterior Colporrhaphy, Repair Of Cystocele;  Recorded: 02/25/2008;     TN APPENDECTOMY      Description: Appendectomy;  Recorded: 10/28/2008;     TN REMOVAL GALLBLADDER      Description: Cholecystectomy;  Recorded: 10/28/2008;     TN VAGINAL HYSTERECTOMY,UTERUS 250 GMS/<      Description: Vaginal Hysterectomy;  Recorded: 02/25/2012;  Comments: for hypermenorrhea/ prolapse; Dr. Cunningham      Family History   Problem Relation Age of Onset     Breast cancer Mother 57     Breast cancer Maternal Grandmother      Unsure of age     "  Social History     Social History     Marital status:      Spouse name: N/A     Number of children: N/A     Years of education: N/A     Occupational History     Not on file.     Social History Main Topics     Smoking status: Former Smoker     Types: Cigarettes     Smokeless tobacco: Never Used     Alcohol use Not on file     Drug use: Not on file     Sexual activity: Not on file     Other Topics Concern     Not on file     Social History Narrative      Current Outpatient Prescriptions   Medication Sig Dispense Refill     amoxicillin-clavulanate (AUGMENTIN) 500-125 mg per tablet Take 1 tablet (500 mg total) by mouth daily. 90 tablet 3     predniSONE (DELTASONE) 10 mg tablet 6 tabs po daily x 2 d, 5 tabs po daily x 2 d, 4 tabs po daily x 2 d,  3 tabs po daily x 2 d,  2 tabs po daily x 2 d,  1 tab po daily x 2 d 42 tablet 0     triamcinolone (KENALOG) 0.1 % cream Apply topically sparingly two times a day until clears 30 g 1     No current facility-administered medications for this visit.       Objective:   Vital Signs:   Visit Vitals     /66 (Patient Site: Right Arm, Patient Position: Sitting, Cuff Size: Adult Regular)     Pulse 72     Temp 97.7  F (36.5  C) (Oral)     Resp 20     Ht 5' 4.25\" (1.632 m)     Wt 163 lb 5 oz (74.1 kg)     LMP 02/17/2001     Breastfeeding No     BMI 27.81 kg/m2        VisionScreening:  No exam data present     PHYSICAL EXAM  EXAM:  /66 (Patient Site: Right Arm, Patient Position: Sitting, Cuff Size: Adult Regular)  Pulse 72  Temp 97.7  F (36.5  C) (Oral)   Resp 20  Ht 5' 4.25\" (1.632 m)  Wt 163 lb 5 oz (74.1 kg)  LMP 02/17/2001  Breastfeeding? No  BMI 27.81 kg/m2   Gen:  NAD, appears well, well-hydrated  HEENT:  TMs nl, oropharynx benign, nasal mucosa nl, conjunctiva clear  Neck:  Supple, no adenopathy, no thyromegaly, no carotid bruits, no JVD  Lungs:  Clear to auscultation bilaterally  Breast exam:  No breast lumps, no skin changes, no nipple discharge, no " axillary adenopathy  Cor:  RRR no murmur  Abd:  Soft, nontender, BS+, no masses, no guarding or rebound, no HSM  Extr:  Neg.  Neuro:  No asymmetry, Nl motor tone/strength, nl sensation, reflexes =, gait nl, nl coordination, CN intact,   Skin:  Warm/dry        Assessment Results 7/17/2018   Activities of Daily Living No help needed   Instrumental Activities of Daily Living No help needed   Mini Cog Total Score 5   Some recent data might be hidden     A Mini-Cog score of 0-2 suggests the possibility of dementia, score of 3-5 suggests no dementia    Identified Health Risks:     She is at risk for lack of exercise and has been provided with information to increase physical activity for the benefit of her well-being.  Patient's advanced directive was discussed and I am comfortable with the patient's wishes.

## 2021-06-20 NOTE — LETTER
Letter by Val Osman AuD at      Author: Val Osman AuD Service: -- Author Type: --    Filed:  Encounter Date: 7/8/2020 Status: (Other)       [facility]- Audiology Benefit Letter    DESTINY TERRAZAS  1950  [address] [place] [address]    Insurance Company: MEDICA MEDICARE ADVANTAGE SOLUTION    [place] Product: No     ID # :  [id number]    Group#:  [id number]    Estimate of Benefits  Consult Visit Benefits:  MEDICA MEDICARE ADV 7/2020 NICOLAS HUFFMAN, HAF, HAC Benefits:   ONLY COVERED BY EPIC HEARING PROVIDER ([facility] ONLY)     ROUTINE HEARING EXAM WITH Epic PROVIDER $0 COPAY  3 HEARING AID FIT EVAL WITH PURCHASE $0 COPAY  HEARING AID VISTA 610 $599 PER AID  HEARING AID VISTA 810 $899 PER AID      Batteries/Accessories Coverage:  1 YEAR FREE BATTERY COVERAGE, NO BATTERIES AFTER 1 YEAR/ NO ACCESSORIES COVERAGE    Representative name: KAAY Inter[employer] reference: [phone]  Date of contact: 7/8/2020    Insurance verified today by MELISSA DONAHUE    Additional Information:      The information provided is an estimate of benefits. This does not guarantee coverage; the insurance company will make the final determination of coverage to include patient responsibility of payment by the patient.   [email or web address] is not responsible for the decisions made by the insurance company regarding coverage.  Any changes to coverage (new plan or new policy) or procedures may void the contents provided in this letter.     Term Definitions  Patient responsibility: Can include but not limited to: co-pays, co-insurance, deductibles, out-of-pocket and non-covered and/or policy exclusions.

## 2021-06-21 ENCOUNTER — ANCILLARY PROCEDURE (OUTPATIENT)
Dept: GENERAL RADIOLOGY | Facility: CLINIC | Age: 71
End: 2021-06-21
Attending: ORTHOPAEDIC SURGERY
Payer: COMMERCIAL

## 2021-06-21 ENCOUNTER — OFFICE VISIT (OUTPATIENT)
Dept: ORTHOPEDICS | Facility: CLINIC | Age: 71
End: 2021-06-21
Payer: COMMERCIAL

## 2021-06-21 DIAGNOSIS — Z96.612 PRESENCE OF LEFT ARTIFICIAL SHOULDER JOINT: Primary | ICD-10-CM

## 2021-06-21 DIAGNOSIS — M25.511 RIGHT SHOULDER PAIN, UNSPECIFIED CHRONICITY: ICD-10-CM

## 2021-06-21 PROCEDURE — 99213 OFFICE O/P EST LOW 20 MIN: CPT | Performed by: ORTHOPAEDIC SURGERY

## 2021-06-21 PROCEDURE — 73030 X-RAY EXAM OF SHOULDER: CPT | Mod: RT | Performed by: RADIOLOGY

## 2021-06-21 NOTE — LETTER
6/21/2021         RE: Cliff Dai  [address] [place] [address]        Dear Colleague,    Thank you for referring your patient, Cliff Dai, to the [facility]. Please see a copy of my visit note below.    CHIEF COMPLAINT:  Right shoulder pain, followup.    HISTORY OF PRESENT ILLNESS:  Ms. Dai returns today for followup.  She has known right shoulder arthritis.  She notes that she was active recently doing some significant amount of work to get the inside of her house ready for painting.  She did not do the painting but the preparation caused a significant flare in her symptoms.  It was about a 2-week period where she was quite miserable.  Since then, she has backed off of that.  She continues to do her home exercises but has backed off of her yoga.  She was taking Tylenol on a daily basis at that time.  She notes that she cannot keep her right shoulder from being more painful unlike her left shoulder preoperatively.  She notes she has trouble wearing her brassiere and is doing the turn and flip.      IMAGING:  Radiographs obtained today, comparison views 01/14/2019, show unchanged glenohumeral arthritis with mild increased narrowing of the glenohumeral joint but no evidence of increased bone loss.    PHYSICAL EXAMINATION:  On exam today, she has 155 degrees of right shoulder forward elevation, 55 degrees of external rotation at the side, internal rotation to the back to T9.  She has 5/5 forward elevator and external rotator strength.  Normal liftoff.    ASSESSMENT:    1.  Right shoulder mildly progressive glenohumeral arthritis.  2.  Left shoulder status post total shoulder arthroplasty in 01/2017.    PLAN:  Ms. Dai and I discussed her options.  She really would be a candidate for total shoulder arthroplasty or if she was having a flareup to have her shoulder injected.  She feels that she is really at her baseline and that the flare has resolved.  She is  concerned about her pain but really is not in an urgent issue to have surgery.    I think we should see each other back in another year with repeat radiographs of bilateral shoulders as she will be 5 years out on the left.  She also had some concerns about weakness on the left.  I told her that if she would continue with her strengthening over there, this would be her best bet.  She demonstrated understanding of this and will see me back in another year or sooner should any additional problems arise.  She will require radiographs of both shoulders at that time.        Lui Gonzalez MD

## 2021-06-21 NOTE — PROGRESS NOTES
CHIEF COMPLAINT:  Right shoulder pain, followup.    HISTORY OF PRESENT ILLNESS:  Ms. Dai returns today for followup.  She has known right shoulder arthritis.  She notes that she was active recently doing some significant amount of work to get the inside of her house ready for painting.  She did not do the painting but the preparation caused a significant flare in her symptoms.  It was about a 2-week period where she was quite miserable.  Since then, she has backed off of that.  She continues to do her home exercises but has backed off of her yoga.  She was taking Tylenol on a daily basis at that time.  She notes that she cannot keep her right shoulder from being more painful unlike her left shoulder preoperatively.  She notes she has trouble wearing her brassiere and is doing the turn and flip.      IMAGING:  Radiographs obtained today, comparison views 01/14/2019, show unchanged glenohumeral arthritis with mild increased narrowing of the glenohumeral joint but no evidence of increased bone loss.    PHYSICAL EXAMINATION:  On exam today, she has 155 degrees of right shoulder forward elevation, 55 degrees of external rotation at the side, internal rotation to the back to T9.  She has 5/5 forward elevator and external rotator strength.  Normal liftoff.    ASSESSMENT:    1.  Right shoulder mildly progressive glenohumeral arthritis.  2.  Left shoulder status post total shoulder arthroplasty in 01/2017.    PLAN:  Ms. Dai and I discussed her options.  She really would be a candidate for total shoulder arthroplasty or if she was having a flareup to have her shoulder injected.  She feels that she is really at her baseline and that the flare has resolved.  She is concerned about her pain but really is not in an urgent issue to have surgery.    I think we should see each other back in another year with repeat radiographs of bilateral shoulders as she will be 5 years out on the left.  She also had some concerns about  weakness on the left.  I told her that if she would continue with her strengthening over there, this would be her best bet.  She demonstrated understanding of this and will see me back in another year or sooner should any additional problems arise.  She will require radiographs of both shoulders at that time.

## 2021-06-21 NOTE — NURSING NOTE
Reason For Visit:   Chief Complaint   Patient presents with     RECHECK     Right shoulder pain follow up        PCP: Hyun Mcgovern        ?  No  Occupation Retired Psychologist/admin.     Date of injury: None     Date of surgery: 2017  Type of surgery: Left TSA  Smoker: No     Right hand dominant       SANE score  Affected shoulder: Right   Right shoulder SANE: 25  Left shoulder SANE: 50    There were no vitals taken for this visit.      Pain Assessment  Patient Currently in Pain: Arcenio Rhoades LPN
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
## 2021-06-25 NOTE — TELEPHONE ENCOUNTER
Called patient to pick-up left hearing aid at the  at earliest convenience. No charge for services, hearing aid in warranty.    Ralf Meneses, CCC-A  Clinical Audiologist   [place] #[id number]

## 2021-06-27 NOTE — PROGRESS NOTES
Progress Notes by Hyun Mcgovern MD at 7/22/2019  7:30 AM     Author: Hyun Mcgovern MD Service: -- Author Type: Physician    Filed: 7/28/2019  1:40 PM Encounter Date: 7/22/2019 Status: Signed    : Hyun Mcgovern MD (Physician)       FEMALE PREVENTATIVE EXAM    Assessment and Plan:     1. Encounter for Medicare annual wellness exam     2. Routine general medical examination at a health care facility     3. Pain of both hip joints     4. Age-related osteoporosis without current pathological fracture     5. Colon cancer screening  Ambulatory referral for Colonoscopy   6. Vitamin D deficiency  Vitamin D, Total (25-Hydroxy)   7. Sjogren's Syndrome  Comprehensive Metabolic Panel    HM2(CBC w/o Differential)   8. Skin lesion  lidocaine 1%-EPINEPHrine 1:100,000 1 %-1:100,000 injection 1 mL (XYLOCAINE W/EPI)    Surgical Pathology Exam   9. Dysuria  Urinalysis    Culture, Urine       This is a 68 yo female here for AWV/physical exam.  1.  Bilateral hip pain - this continues to trouble her - she has had physical therapy/multiple interventions - no workup indicated currently  2.  Osteoporosis - due for DXA scan in future - could do this in next year.   3.  Colon cancer screening - due - refer for colonoscopy  4.  Vitamin D deficiency - has taken supplements - check Vitamin D   5.  Sjogren's syndrome - check labs - generally stable  6.  Dysuria - may have infection - will check UA/UC  7.  Skin lesion - has slightly raised lesion on left chest - it has changed recently - it is most likely a seborrheic keratosis, but somewhat troubling - will do a shave biopsy today (see procedure note)    Next follow up:  Return in about 1 year (around 7/22/2020) for Annual wellness visit.    Immunization Review  Adult Imm Review: No immunizations due today  patient is not a tobacco smoker    I discussed the following with the patient:   Adult Healthy Living: Importance of regular exercise  Healthy  "nutrition    I have had an Advance Directives discussion with the patient.    Subjective:   Chief Complaint: Cliff Dai is an 69 y.o. female here for a preventative health visit.     HPI:      No specific complaints today - feeling well  still having pain in hips - has been working with ortho        Healthy Habits  Are you taking a daily aspirin? No  Do you typically exercising at least 40 min, 3-4 times per week?  NO  Do you usually eat at least 4 servings of fruit and vegetables a day, include whole grains and fiber and avoid regularly eating high fat foods? Yes  Have you had an eye exam in the past two years? Yes  Do you see a dentist twice per year? Yes  Do you have any concerns regarding sleep? No    Safety Screen  If you own firearms, are they secured in a locked gun cabinet or with trigger locks? NO  Do you feel you are safe where you are living?: Yes (7/22/2019  7:23 AM)  Do you feel you are safe in your relationship(s)?: Yes (7/22/2019  7:23 AM)      Review of Systems:  Please see above.  The rest of the review of systems are negative for all systems.     Pap History:   patient is > age 65  Cancer Screening       Status Date      COLONOSCOPY Next Due 9/1/2019      Done 9/1/2009      Patient has more history with this topic...    MAMMOGRAM Next Due 6/6/2021      Done 6/6/2019 MAMMO SCREENING BILATERAL     Patient has more history with this topic...          Patient Care Team:  Hyun Mcgovern MD as PCP - General        History     Reviewed By Date/Time Sections Reviewed    Hyun Mcgovern MD 7/22/2019  7:43 AM Medical, Surgical, Tobacco, Alcohol, Drug Use, Sexual Activity, Family    Codi Mcguire CMA 7/22/2019  7:30 AM Tobacco            Objective:   Vital Signs:   Visit Vitals  BP 98/60 (Patient Site: Right Arm, Patient Position: Sitting, Cuff Size: Adult Regular)   Pulse 72   Temp 98.3  F (36.8  C) (Oral)   Resp 16   Ht 5' 4.25\" (1.632 m)   Wt 156 lb 6.4 oz (70.9 kg)   LMP " "02/17/2001   SpO2 98% Comment: ra   Breastfeeding? No   BMI 26.64 kg/m           PHYSICAL EXAM  EXAM:  BP 98/60 (Patient Site: Right Arm, Patient Position: Sitting, Cuff Size: Adult Regular)   Pulse 72   Temp 98.3  F (36.8  C) (Oral)   Resp 16   Ht 5' 4.25\" (1.632 m)   Wt 156 lb 6.4 oz (70.9 kg)   LMP 02/17/2001   SpO2 98% Comment: ra  Breastfeeding? No   BMI 26.64 kg/m     Gen:  NAD, appears well, well-hydrated  HEENT:  TMs nl, oropharynx benign, nasal mucosa nl, conjunctiva clear  Neck:  Supple, no adenopathy, no thyromegaly, no carotid bruits, no JVD  Lungs:  Clear to auscultation bilaterally  Cor:  RRR no murmur  Breast exam:  No breast lumps, no skin changes, no nipple discharge, no axillary adenopathy  Abd:  Soft, nontender, BS+, no masses, no guarding or rebound, no HSM  Extr:  Decreased ROM bilateral hips  Neuro:  No asymmetry, Nl motor tone/strength, nl sensation, reflexes =, gait nl, nl coordination, CN intact,   Skin:  Warm/dry, inflamed raised lesion left anterior chest wall      Shave Biopsy Procedure Note    Pre-operative Diagnosis: Suspicious lesion    Post-operative Diagnosis: same    Locations: left anterior chest    Indications: changing lesion  -     Anesthesia: Lidocaine 1% with epinephrine     Procedure Details   History of allergy to iodine: no    Patient informed of the risks (including bleeding and infection) and benefits of the   procedure and Verbal informed consent obtained.    The lesion and surrounding area were given a sterile prep using betadyne and draped in the usual sterile fashion. A scalpel was used to shave an area of skin approximately 0.2 cm by 0.2cm.  Hemostasis achieved with hyfrecation. Antibiotic ointment and a sterile dressing applied.  The specimen was sent for pathologic examination. The patient tolerated the procedure well.    EBL: <1 ml    Findings:  Inflamed raised skin lesion    Condition:  Stable    Complications:  none.    Plan:  1. Instructed to keep the " wound dry and covered for 24-48h and clean thereafter.  2. Warning signs of infection were reviewed.    3. Recommended that the patient use OTC analgesics as needed for pain.   4. Return as needed.  Will notify of results.           The 10-year ASCVD risk score (Marlena ASTORGA Jr., et al., 2013) is: 6.5%    Values used to calculate the score:      Age: 69 years      Sex: Female      Is Non- : No      Diabetic: No      Tobacco smoker: No      Systolic Blood Pressure: 98 mmHg      Is BP treated: No      HDL Cholesterol: 57 mg/dL      Total Cholesterol: 212 mg/dL         Medication List           Accurate as of 7/22/19 11:59 PM. If you have any questions, ask your nurse or doctor.               CONTINUE taking these medications    amoxicillin-clavulanate 500-125 mg per tablet  Also known as:  AUGMENTIN  INSTRUCTIONS:  Take 1 tablet (500 mg total) by mouth daily.  Reason for med:  KNOWN/SUSPECTED INFECTION        predniSONE 10 mg tablet  Also known as:  DELTASONE  INSTRUCTIONS:  6 tabs po daily x 2 d, 5 tabs po daily x 2 d, 4 tabs po daily x 2 d,  3 tabs po daily x 2 d,  2 tabs po daily x 2 d,  1 tab po daily x 2 d        triamcinolone 0.1 % cream  Also known as:  KENALOG  INSTRUCTIONS:  Apply topically sparingly two times a day until clears        ZyrTEC 10 mg Cap  Generic drug:  cetirizine               Additional Screenings Completed Today:

## 2021-06-28 NOTE — PROGRESS NOTES
Progress Notes by Luisa Ramirez AuD at 2/6/2020  1:00 PM     Author: Luisa Ramirez AuD Service: -- Author Type: Audiologist    Filed: 2/6/2020  1:53 PM Encounter Date: 2/6/2020 Status: Signed    : Luisa Ramirez AuD (Audiologist)       Audiology Report:    Referring Provider:  Dr. Mcgovern    History:  Cliff Dai is seen today for comprehensive hearing evaluation. She has a history of difficulty hearing bilaterally for the past year. Cliff states she has trouble hearing on the telephone and hearing in meetings. She denies a history of otalgia, otorrhea, aural fullness, tinnitus, ear surgery, dizziness, noise exposure, and family history of hearing loss.      Results:     Left Ear Right Ear   Otoscopy clear canals clear canals   Pure Tone Audiometry normal hearing from 250-1000 Hz sloping to severe sensorineural hearing loss  normal hearing from 250-2000 Hz sloping to severe sensorineural hearing loss    Word Recognition excellent excellent   Acoustic Reflexes Present ipsilateral and contralateral reflexes  Present ipsilateral and contralateral reflexes    Tympanometry normal (Type A)  normal (Type A)     Transducer: Insert earphones and Circumaural headphones    Reliability was good  and there was good  SRT to PTA agreement.       Plan:  Results are discussed in detail.  She should return for retesting in 1 year to monitor the asymmetry in hearing found today. Cliff is told she is a borderline candidate for hearing aids if motivated.    Please see audiogram below or under media and audiogram in the patients chart.     Ralf Waddell, [facility]  [place] Licensed Audiologist #[id number]

## 2021-06-29 NOTE — PROGRESS NOTES
"Progress Notes by Maria Luz Sneed AuD at 7/6/2020  9:00 AM     Author: Maria Luz Sneed AuD Service: -- Author Type: Audiologist    Filed: 7/6/2020  9:30 AM Encounter Date: 7/6/2020 Status: Signed    : Maria Luz Sneed AuD (Audiologist)       Hearing evaluation in conjunction with ENT exam (Dr. Gentile)    Summary:  Audiology visit completed. Please see audiogram below or under \"media\" tab for history and results.    Behavioral results stable, bilaterally, per 2-6-20 audiogram.  Patient reported no new medications/supplements since last being seen by audiology.    Transducer:  Both insert phones and circumaural headphones were used.    Reliability:    Good    Recommendations:  Follow-up with ENT; retest hearing annually (to monitor) or per medical management/patient concern.  Wear hearing protection consistently in noise to preserve residual hearing sensitivity and to minimize the effects of tinnitus.  Cliff Dai is a potential binaural amplification candidate, if patient motivation exists and medical clearance is granted.    Ralf Regalado, [facility]  [place] Licensed Audiologist [id number]           "

## 2021-08-23 ENCOUNTER — ANCILLARY PROCEDURE (OUTPATIENT)
Dept: MAMMOGRAPHY | Facility: CLINIC | Age: 71
End: 2021-08-23
Attending: FAMILY MEDICINE
Payer: COMMERCIAL

## 2021-08-23 ENCOUNTER — OFFICE VISIT (OUTPATIENT)
Dept: FAMILY MEDICINE | Facility: CLINIC | Age: 71
End: 2021-08-23
Payer: COMMERCIAL

## 2021-08-23 VITALS
DIASTOLIC BLOOD PRESSURE: 62 MMHG | BODY MASS INDEX: 25.86 KG/M2 | TEMPERATURE: 98.1 F | WEIGHT: 153 LBS | SYSTOLIC BLOOD PRESSURE: 93 MMHG | HEART RATE: 79 BPM

## 2021-08-23 DIAGNOSIS — Z80.3 FAMILY HISTORY OF MALIGNANT NEOPLASM OF BREAST: ICD-10-CM

## 2021-08-23 DIAGNOSIS — Z12.31 SCREENING MAMMOGRAM, ENCOUNTER FOR: ICD-10-CM

## 2021-08-23 DIAGNOSIS — L82.0 INFLAMED SEBORRHEIC KERATOSIS: Primary | ICD-10-CM

## 2021-08-23 DIAGNOSIS — R39.9 UTI SYMPTOMS: ICD-10-CM

## 2021-08-23 PROBLEM — M16.12 PRIMARY OSTEOARTHRITIS OF LEFT HIP: Status: ACTIVE | Noted: 2017-05-05

## 2021-08-23 PROBLEM — M19.019 INFLAMMATION OF JOINT OF SHOULDER REGION: Status: ACTIVE | Noted: 2017-01-10

## 2021-08-23 PROBLEM — N39.3 FEMALE STRESS INCONTINENCE: Status: ACTIVE | Noted: 2021-08-23

## 2021-08-23 PROBLEM — M19.012 OSTEOARTHRITIS OF BOTH SHOULDERS: Status: ACTIVE | Noted: 2017-01-13

## 2021-08-23 PROBLEM — M35.00 SJOGREN'S SYNDROME (H): Status: ACTIVE | Noted: 2021-08-23

## 2021-08-23 PROBLEM — M25.552 HIP PAIN, LEFT: Status: ACTIVE | Noted: 2017-05-05

## 2021-08-23 PROBLEM — N81.10 PROLAPSE OF VAGINAL WALL: Status: ACTIVE | Noted: 2021-08-23

## 2021-08-23 PROBLEM — M70.62 GREATER TROCHANTERIC BURSITIS OF LEFT HIP: Status: ACTIVE | Noted: 2018-05-01

## 2021-08-23 PROBLEM — M19.012 BILATERAL SHOULDER REGION ARTHRITIS: Status: ACTIVE | Noted: 2017-01-10

## 2021-08-23 PROBLEM — E55.9 VITAMIN D DEFICIENCY: Status: ACTIVE | Noted: 2018-07-17

## 2021-08-23 PROBLEM — M81.0 OSTEOPOROSIS: Status: ACTIVE | Noted: 2021-08-23

## 2021-08-23 PROBLEM — L30.9 DERMATITIS: Status: ACTIVE | Noted: 2018-07-17

## 2021-08-23 PROBLEM — R73.9 HYPERGLYCEMIA: Status: ACTIVE | Noted: 2021-08-23

## 2021-08-23 PROBLEM — R42 VERTIGO: Status: ACTIVE | Noted: 2021-03-02

## 2021-08-23 PROBLEM — M19.011 OSTEOARTHRITIS OF BOTH SHOULDERS: Status: ACTIVE | Noted: 2017-01-13

## 2021-08-23 PROBLEM — E87.6 HYPOKALEMIA: Status: ACTIVE | Noted: 2021-08-23

## 2021-08-23 PROBLEM — R60.9 EDEMA: Status: ACTIVE | Noted: 2021-08-23

## 2021-08-23 PROBLEM — M19.011 BILATERAL SHOULDER REGION ARTHRITIS: Status: ACTIVE | Noted: 2017-01-10

## 2021-08-23 PROBLEM — R63.1 EXCESSIVE THIRST: Status: ACTIVE | Noted: 2021-08-23

## 2021-08-23 PROBLEM — N95.1 SYMPTOMATIC MENOPAUSAL OR FEMALE CLIMACTERIC STATES: Status: ACTIVE | Noted: 2021-08-23

## 2021-08-23 PROCEDURE — 99213 OFFICE O/P EST LOW 20 MIN: CPT | Performed by: FAMILY MEDICINE

## 2021-08-23 PROCEDURE — 87086 URINE CULTURE/COLONY COUNT: CPT | Performed by: FAMILY MEDICINE

## 2021-08-23 PROCEDURE — 77063 BREAST TOMOSYNTHESIS BI: CPT

## 2021-08-23 RX ORDER — AMOXICILLIN 250 MG/1
250 CAPSULE ORAL
COMMUNITY
End: 2022-03-02

## 2021-08-23 NOTE — PROGRESS NOTES
"Assessment & Plan    1. Inflamed seborrheic keratosis  This is a 70 yo female with new pruritic lesion in right mid back.  This is a classic inflamed/irritated seborrheic keratosis.  No intervention required.     2. Family history of malignant neoplasm of breast  Patient notes family history of cancer, eezkiel breast cancer in mom/ MGM.  She is interested in genetic risks - especially in terms of her daughter's risks.  Discussed that she could consult with a .  Will refer.  She can decide how far she'd like to go with this.    - Cancer Risk Mgmt/Cancer Genetic Counseling Referral; Future    3. UTI symptoms  Patient has mild UTI symptoms - will check culture to determine if infection is present.  Will treat only if culture positive.   - Urine Culture Aerobic Bacterial - lab collect; Future    Return if symptoms worsen or fail to improve.    Chief Complaint   Patient presents with     Mass     itchy spot on back- ongoing several weeks      HPI  Has a \"pesky spot\" on her back  Itches  Can't see it   told her it looks \"okay\"    Mother and grandmother had breast cancer -   Has 2 sisters - neither with breast cancer -   Mom and MGM had cancer at later ages -   Mom lived to age 93    Other cancers:  Dad had stomach cancer    Has standard derm appointment in September -     Takes Amox-Clav, 1/2 tab daily for UTI prevention  A week ago, had sensation of UTI - took full tablet daily x 7 days    Feels overwhelmed by stimuli  Media - war in [place], resurgence of COVID-19             Patient Active Problem List   Diagnosis     Presence of artificial shoulder joint     Hip pain, left     Bilateral shoulder region arthritis     Cervicalgia     Edema     Excessive thirst     Female stress incontinence     Hyperglycemia     Hypokalemia     Inflammation of joint of shoulder region     Low back pain     Osteoarthritis of both shoulders     Osteoporosis     Pain in joint, shoulder region     Peripheral neuropathy     " Primary osteoarthritis of left hip     Prolapse of vaginal wall     Greater trochanteric bursitis of left hip     Sjogren's syndrome (H)     Dermatitis     Symptomatic menopausal or female climacteric states     Urticaria     Vertigo     Vitamin D deficiency        Past Medical History:   Diagnosis Date     Arthritis     OSTEOARTHRITIS BOTH SHOULDERS     Osteoporosis      Peripheral neuropathy      Sjogren's syndrome (H)      Stress incontinence         Current Outpatient Medications   Medication     amoxicillin (AMOXIL) 250 MG capsule     amoxicillin-clavulanate (AUGMENTIN) 500-125 MG tablet     cetirizine HCl (ZYRTEC ALLERGY) 10 MG CAPS     cholecalciferol 25 MCG (1000 UT) TABS     No current facility-administered medications for this visit.        Past Surgical History:   Procedure Laterality Date     APPENDECTOMY  2008     ARTHROPLASTY SHOULDER Left 2/1/2017    Procedure: ARTHROPLASTY SHOULDER;  Surgeon: Lui Gonzalez MD;  Location: [facility] OR     C APPENDECTOMY      Description: Appendectomy;  Recorded: 10/28/2008;     CHOLECYSTECTOMY  2008     GYN SURGERY  2012    VAG HYST, OOPHORECTOMY     GYN SURGERY  2008    ANTERIOR COLPORRHAPHY, REPAIR CYSTOCELE     HC ANTER COLPORRHAPHY,BLAD/VAGINA      Description: Anterior Colporrhaphy, Repair Of Cystocele;  Recorded: 02/25/2008;     HC REMOVAL GALLBLADDER      Description: Cholecystectomy;  Recorded: 10/28/2008;     HYSTERECTOMY  2004     OOPHORECTOMY Bilateral 2004     WA VAGINAL HYSTERECTOMY,UTERUS 250 GMS/<      Description: Vaginal Hysterectomy;  Recorded: 02/25/2012;  Comments: for hypermenorrhea/ prolapse; Dr. Cunningham        Social History     Socioeconomic History     Marital status:      Spouse name: Not on file     Number of children: Not on file     Years of education: Not on file     Highest education level: Not on file   Occupational History     Not on file   Tobacco Use     Smoking status: Former Smoker     Types: Cigarettes, Cigarettes      Quit date: 1980     Years since quittin.6     Smokeless tobacco: Never Used   Substance and Sexual Activity     Alcohol use: Not on file     Comment: 2-3 GLASSES OF WINE 3 TIMES/WEEK     Drug use: Not on file     Sexual activity: Not on file   Other Topics Concern     Not on file   Social History Narrative     Not on file     Social Determinants of Health     Financial Resource Strain:      Difficulty of Paying Living Expenses:    Food Insecurity:      Worried About Running Out of Food in the Last Year:      Ran Out of Food in the Last Year:    Transportation Needs:      Lack of Transportation (Medical):      Lack of Transportation (Non-Medical):    Physical Activity:      Days of Exercise per Week:      Minutes of Exercise per Session:    Stress:      Feeling of Stress :    Social Connections:      Frequency of Communication with Friends and Family:      Frequency of Social Gatherings with Friends and Family:      Attends [employer] Services:      Active Member of Clubs or Organizations:      Attends Club or Organization Meetings:      Marital Status:    Intimate Partner Violence:      Fear of Current or Ex-Partner:      Emotionally Abused:      Physically Abused:      Sexually Abused:         Family History   Problem Relation Age of Onset     Breast Cancer Mother 57.00     Breast Cancer Maternal Grandmother         Unsure of age        Review of Systems   All other systems reviewed and are negative.       BP 93/62   Pulse 79   Temp 98.1  F (36.7  C)   Wt 69.4 kg (153 lb)   LMP  (LMP Unknown)   BMI 25.86 kg/m       Physical Exam  Constitutional:       Appearance: Normal appearance.   HENT:      Head: Normocephalic and atraumatic.      Right Ear: Tympanic membrane, ear canal and external ear normal.      Left Ear: Tympanic membrane, ear canal and external ear normal.      Nose: Nose normal.      Mouth/Throat:      Pharynx: No posterior oropharyngeal erythema.   Eyes:      Extraocular Movements:  Extraocular movements intact.      Conjunctiva/sclera: Conjunctivae normal.   Cardiovascular:      Rate and Rhythm: Normal rate and regular rhythm.      Pulses: Normal pulses.      Heart sounds: Normal heart sounds.   Pulmonary:      Effort: Pulmonary effort is normal.      Breath sounds: Normal breath sounds.   Abdominal:      Palpations: Abdomen is soft.      Tenderness: There is no abdominal tenderness. There is no right CVA tenderness, left CVA tenderness, guarding or rebound.   Musculoskeletal:         General: Normal range of motion.      Cervical back: Normal range of motion.   Skin:     Findings: Lesion (exophytic hyperpigmented lesion) present.   Neurological:      General: No focal deficit present.      Mental Status: She is alert.   Psychiatric:         Mood and Affect: Mood normal.          Results:  Results for orders placed or performed in visit on 08/23/21   MA Screen Bilateral w/Michael     Status: None    Narrative    BILATERAL FULL FIELD DIGITAL SCREENING MAMMOGRAM WITH TOMOSYNTHESIS    Performed on: 8/23/21    Compared to: 07/10/2020, 06/06/2019, 04/12/2018, 04/04/2017, 02/19/2016,   02/17/2015, 02/14/2014, 02/13/2013, and 02/07/2012    Technique:  This study was evaluated with the assistance of Computer-Aided   Detection.  Breast Tomosynthesis was used in interpretation.    Findings: The breasts have scattered areas of fibroglandular density.    There is no radiographic evidence of malignancy.     IMPRESSION: ACR BI-RADS Category 1: Negative    RECOMMENDED FOLLOW-UP: Annual routine screening mammogram    The results and recommendations of this examination will be communicated   to the patient.       Results for orders placed or performed in visit on 08/23/21   Urine Culture Aerobic Bacterial - lab collect     Status: None    Specimen: Urine, Midstream   Result Value Ref Range    Culture No Growth        Medications at Conclusion of Visit:  Current Outpatient Medications   Medication Sig Dispense  Refill     amoxicillin (AMOXIL) 250 MG capsule Take 250 mg by mouth       amoxicillin-clavulanate (AUGMENTIN) 500-125 MG tablet        cetirizine HCl (ZYRTEC ALLERGY) 10 MG CAPS        cholecalciferol 25 MCG (1000 UT) TABS Take 1,000 Units by mouth           MARIUM KRUEGER MD

## 2021-08-24 LAB — BACTERIA UR CULT: NO GROWTH

## 2021-09-04 ENCOUNTER — HEALTH MAINTENANCE LETTER (OUTPATIENT)
Age: 71
End: 2021-09-04

## 2021-11-10 ENCOUNTER — IMMUNIZATION (OUTPATIENT)
Dept: NURSING | Facility: CLINIC | Age: 71
End: 2021-11-10
Payer: COMMERCIAL

## 2021-11-10 PROCEDURE — 0064A COVID-19,PF,MODERNA (18+ YRS BOOSTER .25ML): CPT

## 2021-11-10 PROCEDURE — 91306 COVID-19,PF,MODERNA (18+ YRS BOOSTER .25ML): CPT

## 2021-12-16 ENCOUNTER — TRANSFERRED RECORDS (OUTPATIENT)
Dept: HEALTH INFORMATION MANAGEMENT | Facility: CLINIC | Age: 71
End: 2021-12-16
Payer: COMMERCIAL

## 2022-01-18 VITALS
RESPIRATION RATE: 16 BRPM | TEMPERATURE: 97.2 F | HEART RATE: 70 BPM | WEIGHT: 157 LBS | BODY MASS INDEX: 26.16 KG/M2 | DIASTOLIC BLOOD PRESSURE: 76 MMHG | HEIGHT: 65 IN | SYSTOLIC BLOOD PRESSURE: 117 MMHG

## 2022-01-18 VITALS
DIASTOLIC BLOOD PRESSURE: 66 MMHG | RESPIRATION RATE: 12 BRPM | SYSTOLIC BLOOD PRESSURE: 104 MMHG | TEMPERATURE: 97.8 F | HEART RATE: 86 BPM

## 2022-01-18 VITALS
HEIGHT: 65 IN | WEIGHT: 158.5 LBS | DIASTOLIC BLOOD PRESSURE: 72 MMHG | TEMPERATURE: 97.6 F | HEART RATE: 64 BPM | SYSTOLIC BLOOD PRESSURE: 112 MMHG | BODY MASS INDEX: 26.41 KG/M2

## 2022-01-18 VITALS
DIASTOLIC BLOOD PRESSURE: 80 MMHG | HEIGHT: 64 IN | SYSTOLIC BLOOD PRESSURE: 114 MMHG | BODY MASS INDEX: 26.58 KG/M2 | RESPIRATION RATE: 16 BRPM | HEART RATE: 76 BPM | WEIGHT: 155.7 LBS

## 2022-01-20 ENCOUNTER — TRANSFERRED RECORDS (OUTPATIENT)
Dept: HEALTH INFORMATION MANAGEMENT | Facility: CLINIC | Age: 72
End: 2022-01-20
Payer: COMMERCIAL

## 2022-03-01 ENCOUNTER — NURSE TRIAGE (OUTPATIENT)
Dept: NURSING | Facility: CLINIC | Age: 72
End: 2022-03-01
Payer: COMMERCIAL

## 2022-03-01 NOTE — TELEPHONE ENCOUNTER
Triage Call: Left wrist on the inside, vein/lump swelling, throbbing pain, constant, 2-3/10. Started last week. No redness, no numbness, no weakness.    According to the protocol, patient should be seen within 24 hours. Care advice given. Patient verbalizes understanding and agrees with plan of care. Connected with scheduling.    COVID 19 Nurse Triage Plan/Patient Instructions    Please be aware that novel coronavirus (COVID-19) may be circulating in the community. If you develop symptoms such as fever, cough, or SOB or if you have concerns about the presence of another infection including coronavirus (COVID-19), please contact your health care provider or visit [email or web address].     Disposition/Instructions    In-Person Visit with provider recommended. Reference Visit Selection Guide.    Thank you for taking steps to prevent the spread of this virus.  o Limit your contact with others.  o Wear a simple mask to cover your cough.  o Wash your hands well and often.    Resources    [facility]: About COVID-19: [email or web address]    CDC: What to Do If You're Sick: www.cdc.gov/coronavirus/2019-ncov/about/steps-when-sick.html    CDC: Ending Home Isolation: www.cdc.gov/coronavirus/2019-ncov/hcp/disposition-in-home-patients.html     CDC: Caring for Someone: www.cdc.gov/coronavirus/2019-ncov/if-you-are-sick/care-for-someone.html     [facility]: Interim Guidance for Hospital Discharge to Home: www.health.[facility].mn.us/diseases/coronavirus/hcp/hospdischarge.pdf    [facility] clinical trials (COVID-19 research studies): clinicalaffairs.[email or web address]/n-clinical-trials     Below are the COVID-19 hotlines at the [facility] ([facility]). Interpreters are available.   o For health questions: Call 542-198-9447 or 1-433.224.2716 (7 a.m. to 7 p.m.)  o For questions about schools and childcare: Call 933-384-0065 or 1-702.714.8435 (7 a.m. to 7 pluly Desouza, RN Nursing Advisor 3/1/2022  3:31 PM     Reason for Disposition    [1] Swelling is painful to touch AND [2] no fever    Additional Information    Negative: Sounds like a life-threatening emergency to the triager    Negative: Small growth, spot, bump, or pigmented area of skin (e.g., moles, skin tags, wart, melanoma, skin cancer)    Negative: Inguinal hernia previously diagnosed by a physician    Negative: Followed a skin injury    Negative: Follows an insect bite    Negative: Swelling of lymph node suspected    Negative: Swelling of vaccination site    Negative: Swelling of tongue    Negative: Swelling of lip    Negative: Swelling of eye    Negative: Swelling of entire face    Negative: Swelling of scrotum    Negative: Swelling of labia    Negative: Swelling of surgical incision    Negative: Swelling of ankle joint    Negative: Swelling of elbow joint    Negative: Swelling of knee joint    Negative: Swelling with a skin rash    Negative: Patient sounds very sick or weak to the triager    Negative: SEVERE pain (e.g., excruciating)    Negative: [1] Swelling is painful to touch AND [2] fever    Negative: [1] Swelling is red AND [2] fever    Negative: [1] Swelling is red AND [2] size > 2 inches (5.0 cm) (Exception: itchy area of skin)    Negative: [1] Swelling of groin (inguinal area) AND [2] painful    Protocols used: SKIN LUMP OR LOCALIZED SWELLING-A-AH

## 2022-03-02 ENCOUNTER — OFFICE VISIT (OUTPATIENT)
Dept: FAMILY MEDICINE | Facility: CLINIC | Age: 72
End: 2022-03-02
Payer: COMMERCIAL

## 2022-03-02 VITALS
SYSTOLIC BLOOD PRESSURE: 118 MMHG | TEMPERATURE: 98.2 F | HEIGHT: 65 IN | BODY MASS INDEX: 25.49 KG/M2 | DIASTOLIC BLOOD PRESSURE: 64 MMHG | OXYGEN SATURATION: 98 % | RESPIRATION RATE: 16 BRPM | WEIGHT: 153 LBS | HEART RATE: 63 BPM

## 2022-03-02 DIAGNOSIS — R22.30 PAIN OF WRIST WITH PALPABLE MASS: Primary | ICD-10-CM

## 2022-03-02 DIAGNOSIS — M25.539 PAIN OF WRIST WITH PALPABLE MASS: Primary | ICD-10-CM

## 2022-03-02 PROCEDURE — 99213 OFFICE O/P EST LOW 20 MIN: CPT | Performed by: FAMILY MEDICINE

## 2022-03-02 NOTE — PROGRESS NOTES
Assessment & Plan      Pain of wrist with palpable mass  Etiology of the patient's mass and discomfort is uncertain.  I did have another physician take a look as well.  She can try an over-the-counter wrist brace for comfort and I like her to see hand surgeon to further evaluate.  - Orthopedic  Referral       I spent a total of 20 minutes on the day of the visit.   Time spent doing chart review, history and exam, discussion with other doctor, documentation and further activities per the note         Return in about 1 month (around 4/2/2022) for Annual Wellness Visit, with your regular doctor.    Samantha Koroma MD  [facility] ELMO Coronado is a 71 year old who presents for the following health issues     History of Present Illness     Asthma:  She presents for follow up of asthma.  She has no cough, no wheezing, and no shortness of breath. She is not using a relief medication. She does not have a controller medication. Patient is aware of the following triggers: none and unaware of any triggers. The patient has not had a visit to the Emergency Room, Urgent Care or Hospital due to asthma since the last clinic visit.   Reason for visit:  Left Wrist  Symptom onset:  1-2 weeks ago  Symptoms include:  Mild Pain and throbbing  Symptom intensity:  Mild  Symptom progression:  Staying the same  Had these symptoms before:  No  What makes it worse:  Flexing of my wrist  What makes it better:  Sleep    She eats 2-3 servings of fruits and vegetables daily.She consumes 0 sweetened beverage(s) daily.She exercises with enough effort to increase her heart rate 20 to 29 minutes per day.  She exercises with enough effort to increase her heart rate 3 or less days per week.   She is taking medications regularly.       Has been typing a lot more and suboptimal ergonomic position and thinks that could possibly be contributing.  Of note, had a problem with her right wrist in the past and wrist brace  "but has not had the left.  Is right-handed.    Pertinent problem list significant for osteoarthritis, osteoporosis, and Sjogren's disease.        Objective    /64   Pulse 63   Temp 98.2  F (36.8  C) (Oral)   Resp 16   Ht 1.638 m (5' 4.5\")   Wt 69.4 kg (153 lb)   LMP  (LMP Unknown)   SpO2 98%   BMI 25.86 kg/m    Body mass index is 25.86 kg/m .  Physical Exam     General: Awake alert in no acute distress  Musculoskeletal, vascular, Derm: On the volar surface of the proximal left wrist on the ulnar side there is slight prominence visible.  Palpation reveals a ropey nodular area with some small hard areas within it.  There is also some subtle swelling over the bilateral distal wrist on the lateral/ulnar area.  Normal strength and sensation of the distal upper extremities        "

## 2022-03-07 ENCOUNTER — TRANSFERRED RECORDS (OUTPATIENT)
Dept: HEALTH INFORMATION MANAGEMENT | Facility: CLINIC | Age: 72
End: 2022-03-07
Payer: COMMERCIAL

## 2022-04-05 ENCOUNTER — TRANSFERRED RECORDS (OUTPATIENT)
Dept: HEALTH INFORMATION MANAGEMENT | Facility: CLINIC | Age: 72
End: 2022-04-05

## 2022-04-09 ASSESSMENT — ACTIVITIES OF DAILY LIVING (ADL): CURRENT_FUNCTION: NO ASSISTANCE NEEDED

## 2022-04-12 ENCOUNTER — OFFICE VISIT (OUTPATIENT)
Dept: FAMILY MEDICINE | Facility: CLINIC | Age: 72
End: 2022-04-12
Payer: COMMERCIAL

## 2022-04-12 VITALS
WEIGHT: 154 LBS | HEART RATE: 70 BPM | HEIGHT: 64 IN | BODY MASS INDEX: 26.29 KG/M2 | TEMPERATURE: 98.5 F | DIASTOLIC BLOOD PRESSURE: 58 MMHG | OXYGEN SATURATION: 98 % | SYSTOLIC BLOOD PRESSURE: 94 MMHG

## 2022-04-12 DIAGNOSIS — E55.9 VITAMIN D DEFICIENCY: ICD-10-CM

## 2022-04-12 DIAGNOSIS — Z00.00 ENCOUNTER FOR MEDICARE ANNUAL WELLNESS EXAM: ICD-10-CM

## 2022-04-12 DIAGNOSIS — R42 LIGHTHEADEDNESS: ICD-10-CM

## 2022-04-12 DIAGNOSIS — Z00.00 ADULT GENERAL MEDICAL EXAM: Primary | ICD-10-CM

## 2022-04-12 DIAGNOSIS — Z13.220 LIPID SCREENING: ICD-10-CM

## 2022-04-12 DIAGNOSIS — R73.9 HYPERGLYCEMIA: ICD-10-CM

## 2022-04-12 LAB
ALBUMIN SERPL-MCNC: 4.2 G/DL (ref 3.5–5)
ALP SERPL-CCNC: 77 U/L (ref 45–120)
ALT SERPL W P-5'-P-CCNC: 18 U/L (ref 0–45)
ANION GAP SERPL CALCULATED.3IONS-SCNC: 11 MMOL/L (ref 5–18)
AST SERPL W P-5'-P-CCNC: 21 U/L (ref 0–40)
BILIRUB SERPL-MCNC: 0.2 MG/DL (ref 0–1)
BUN SERPL-MCNC: 19 MG/DL (ref 8–28)
CALCIUM SERPL-MCNC: 9.4 MG/DL (ref 8.5–10.5)
CHLORIDE BLD-SCNC: 102 MMOL/L (ref 98–107)
CHOLEST SERPL-MCNC: 202 MG/DL
CO2 SERPL-SCNC: 24 MMOL/L (ref 22–31)
CREAT SERPL-MCNC: 0.63 MG/DL (ref 0.6–1.1)
ERYTHROCYTE [DISTWIDTH] IN BLOOD BY AUTOMATED COUNT: 12.6 % (ref 10–15)
FASTING STATUS PATIENT QL REPORTED: ABNORMAL
GFR SERPL CREATININE-BSD FRML MDRD: >90 ML/MIN/1.73M2
GLUCOSE BLD-MCNC: 97 MG/DL (ref 70–125)
HBA1C MFR BLD: 5.5 % (ref 0–5.6)
HCT VFR BLD AUTO: 40.4 % (ref 35–47)
HDLC SERPL-MCNC: 52 MG/DL
HGB BLD-MCNC: 13.4 G/DL (ref 11.7–15.7)
LDLC SERPL CALC-MCNC: 120 MG/DL
MCH RBC QN AUTO: 32.5 PG (ref 26.5–33)
MCHC RBC AUTO-ENTMCNC: 33.2 G/DL (ref 31.5–36.5)
MCV RBC AUTO: 98 FL (ref 78–100)
PLATELET # BLD AUTO: 270 10E3/UL (ref 150–450)
POTASSIUM BLD-SCNC: 4.4 MMOL/L (ref 3.5–5)
PROT SERPL-MCNC: 6.8 G/DL (ref 6–8)
RBC # BLD AUTO: 4.12 10E6/UL (ref 3.8–5.2)
SODIUM SERPL-SCNC: 137 MMOL/L (ref 136–145)
TRIGL SERPL-MCNC: 152 MG/DL
WBC # BLD AUTO: 6.5 10E3/UL (ref 4–11)

## 2022-04-12 PROCEDURE — 80061 LIPID PANEL: CPT | Performed by: FAMILY MEDICINE

## 2022-04-12 PROCEDURE — 83036 HEMOGLOBIN GLYCOSYLATED A1C: CPT | Performed by: FAMILY MEDICINE

## 2022-04-12 PROCEDURE — 80053 COMPREHEN METABOLIC PANEL: CPT | Performed by: FAMILY MEDICINE

## 2022-04-12 PROCEDURE — 99397 PER PM REEVAL EST PAT 65+ YR: CPT | Performed by: FAMILY MEDICINE

## 2022-04-12 PROCEDURE — 36415 COLL VENOUS BLD VENIPUNCTURE: CPT | Performed by: FAMILY MEDICINE

## 2022-04-12 PROCEDURE — 91306 COVID-19,PF,MODERNA (18+ YRS BOOSTER .25ML): CPT | Performed by: FAMILY MEDICINE

## 2022-04-12 PROCEDURE — 0064A COVID-19,PF,MODERNA (18+ YRS BOOSTER .25ML): CPT | Performed by: FAMILY MEDICINE

## 2022-04-12 PROCEDURE — 85027 COMPLETE CBC AUTOMATED: CPT | Performed by: FAMILY MEDICINE

## 2022-04-12 PROCEDURE — 82306 VITAMIN D 25 HYDROXY: CPT | Performed by: FAMILY MEDICINE

## 2022-04-12 RX ORDER — FLUOCINONIDE TOPICAL SOLUTION USP, 0.05% 0.5 MG/ML
SOLUTION TOPICAL
COMMUNITY
Start: 2021-12-16 | End: 2023-07-10

## 2022-04-12 ASSESSMENT — ACTIVITIES OF DAILY LIVING (ADL): CURRENT_FUNCTION: NO ASSISTANCE NEEDED

## 2022-04-12 NOTE — PROGRESS NOTES
"SUBJECTIVE:   Cliff Dai is a 71 year old female who presents for Preventive Visit.        Are you in the first 12 months of your Medicare coverage?  No    Had injury left wrist - \"caterpillar\" - separation of left wrist   Inherited a project from a friend - friend developed cancer - and now    Patient has now inherited the friend's library   Has 1st edition [employer] books -       Healthy Habits:     In general, how would you rate your overall health?  Good    Frequency of exercise:  2-3 days/week    Duration of exercise:  15-30 minutes    Do you usually eat at least 4 servings of fruit and vegetables a day, include whole grains    & fiber and avoid regularly eating high fat or \"junk\" foods?  Yes    Taking medications regularly:  Yes    Barriers to taking medications:  None    Medication side effects:  None    Ability to successfully perform activities of daily living:  No assistance needed    Home Safety:  No safety concerns identified    Hearing Impairment:  No hearing concerns    In the past 6 months, have you been bothered by leaking of urine?  No    In general, how would you rate your overall mental or emotional health?  Good      PHQ-2 Total Score: 0    Additional concerns today:  Yes    Do you feel safe in your environment? Yes    Have you ever done Advance Care Planning? (For example, a Health Directive, POLST, or a discussion with a medical provider or your loved ones about your wishes): No, advance care planning information given to patient to review.  Patient plans to discuss their wishes with loved ones or provider.         Fall risk  Fallen 2 or more times in the past year?: No  Any fall with injury in the past year?: No    Cognitive Screening   1) Repeat 3 items (Leader, Season, Table)    2) Clock draw: NORMAL  3) 3 item recall: Recalls 3 objects  Results: 3 items recalled: COGNITIVE IMPAIRMENT LESS LIKELY    Mini-CogTM Copyright S Saeid. Licensed by the author for use in [employer] " Services; reprinted with permission (somissy@[facility]). All rights reserved.          Reviewed and updated as needed this visit by clinical staff   Tobacco  Allergies  Meds                Reviewed and updated as needed this visit by Provider                   Social History     Tobacco Use     Smoking status: Former Smoker     Types: Cigarettes, Cigarettes     Quit date: 1980     Years since quittin.3     Smokeless tobacco: Never Used   Substance Use Topics     Alcohol use: Not on file     Comment: 2-3 GLASSES OF WINE 3 TIMES/WEEK     If you drink alcohol do you typically have >3 drinks per day or >7 drinks per week? No    No flowsheet data found.  Current providers sharing in care for this patient include:     Patient Care Team:  Hyun Mcgovern MD as PCP - General (Family Practice)  Lui Gonzalez MD as MD (Orthopedics)  Hyun Mcgovern MD as Assigned PCP  Lui Gonzalez MD as Assigned Musculoskeletal Provider    The following health maintenance items are reviewed in Epic and correct as of today:  There are no preventive care reminders to display for this patient.  BP Readings from Last 3 Encounters:   22 94/58   22 118/64   21 93/62    Wt Readings from Last 3 Encounters:   22 69.9 kg (154 lb)   22 69.4 kg (153 lb)   21 69.4 kg (153 lb)                  Patient Active Problem List   Diagnosis     Presence of artificial shoulder joint     Hip pain, left     Bilateral shoulder region arthritis     Cervicalgia     Edema     Excessive thirst     Female stress incontinence     Hyperglycemia     Hypokalemia     Inflammation of joint of shoulder region     Low back pain     Osteoarthritis of both shoulders     Osteoporosis     Pain in joint, shoulder region     Peripheral neuropathy     Primary osteoarthritis of left hip     Prolapse of vaginal wall     Greater trochanteric bursitis of left hip     Sjogren's syndrome (H)     Dermatitis      Symptomatic menopausal or female climacteric states     Urticaria     Vertigo     Vitamin D deficiency     Past Surgical History:   Procedure Laterality Date     APPENDECTOMY  2008     ARTHROPLASTY SHOULDER Left 2017    Procedure: ARTHROPLASTY SHOULDER;  Surgeon: Lui Gonzalez MD;  Location: [facility]     CHOLECYSTECTOMY       GYN SURGERY      VAG HYST, OOPHORECTOMY     GYN SURGERY      ANTERIOR COLPORRHAPHY, REPAIR CYSTOCELE     HC ANTER COLPORRHAPHY,BLAD/VAGINA      Description: Anterior Colporrhaphy, Repair Of Cystocele;  Recorded: 2008;     HC REMOVAL GALLBLADDER      Description: Cholecystectomy;  Recorded: 10/28/2008;     HYSTERECTOMY  2004     OOPHORECTOMY Bilateral      HI VAGINAL HYSTERECTOMY,UTERUS 250 GMS/<      Description: Vaginal Hysterectomy;  Recorded: 2012;  Comments: for hypermenorrhea/ prolapse; Dr. Octavio LYNN APPENDECTOMY      Description: Appendectomy;  Recorded: 10/28/2008;       Social History     Tobacco Use     Smoking status: Former Smoker     Types: Cigarettes, Cigarettes     Quit date: 1980     Years since quittin.3     Smokeless tobacco: Never Used   Substance Use Topics     Alcohol use: Not on file     Comment: 2-3 GLASSES OF WINE 3 TIMES/WEEK     Family History   Problem Relation Age of Onset     Breast Cancer Mother 57.00     Breast Cancer Maternal Grandmother         Unsure of age         Current Outpatient Medications   Medication Sig Dispense Refill     amoxicillin-clavulanate (AUGMENTIN) 875-125 MG tablet Take 0.5 tablets by mouth daily       cetirizine HCl 10 MG CAPS        cholecalciferol 25 MCG (1000 UT) TABS Take 1,000 Units by mouth       fluocinonide (LIDEX) 0.05 % external solution 1 APPLICATION TWICE A DAY AS NEEDED TOPICALLY TO SCALP       Allergies   Allergen Reactions     Cephalexin Itching     Other Environmental Allergy      PN: LW CM1: >>> NO CONTRAST ADVERSE REACTION <<<     Reaction :  PN: LW CM1: >>> NO CONTRAST  "ADVERSE REACTION <<<     Reaction :     Reviewed HM        Review of Systems   Constitutional: Negative for chills and fever.   Respiratory: Negative for shortness of breath.    Cardiovascular: Negative for chest pain and peripheral edema.   Gastrointestinal: Negative for abdominal pain.   Musculoskeletal:        Left wrist pain de to poor ergonomic workspace   Neurological: Negative for weakness.   All other systems reviewed and are negative.        OBJECTIVE:   BP 94/58   Pulse 70   Temp 98.5  F (36.9  C)   Ht 1.626 m (5' 4\")   Wt 69.9 kg (154 lb)   LMP  (LMP Unknown)   SpO2 98%   BMI 26.43 kg/m   Estimated body mass index is 26.43 kg/m  as calculated from the following:    Height as of this encounter: 1.626 m (5' 4\").    Weight as of this encounter: 69.9 kg (154 lb).  Physical Exam  Constitutional:       General: She is not in acute distress.     Appearance: She is well-developed.   HENT:      Right Ear: Tympanic membrane and external ear normal.      Left Ear: Tympanic membrane and external ear normal.      Nose: Nose normal.      Mouth/Throat:      Pharynx: No oropharyngeal exudate.   Eyes:      General:         Right eye: No discharge.         Left eye: No discharge.      Conjunctiva/sclera: Conjunctivae normal.      Pupils: Pupils are equal, round, and reactive to light.   Neck:      Thyroid: No thyromegaly.      Trachea: No tracheal deviation.   Cardiovascular:      Rate and Rhythm: Normal rate and regular rhythm.      Pulses: Normal pulses.      Heart sounds: Normal heart sounds, S1 normal and S2 normal. No murmur heard.    No friction rub. No S3 or S4 sounds.   Pulmonary:      Effort: Pulmonary effort is normal. No respiratory distress.      Breath sounds: Normal breath sounds. No wheezing or rales.   Chest:   Breasts:      Right: No mass, nipple discharge or tenderness.      Left: No mass, nipple discharge or tenderness.       Abdominal:      General: Bowel sounds are normal.      Palpations: " Abdomen is soft. There is no mass.      Tenderness: There is no abdominal tenderness.   Musculoskeletal:         General: Normal range of motion.      Cervical back: Neck supple.   Lymphadenopathy:      Cervical: No cervical adenopathy.   Skin:     General: Skin is warm and dry.      Findings: No rash.   Neurological:      General: No focal deficit present.      Mental Status: She is alert and oriented to person, place, and time.      Motor: No abnormal muscle tone.      Deep Tendon Reflexes: Reflexes are normal and symmetric.   Psychiatric:         Mood and Affect: Mood normal.         Thought Content: Thought content normal.         Judgment: Judgment normal.         Diagnostic Test Results:  Labs reviewed in Epic  Results for orders placed or performed in visit on 04/12/22   Comprehensive metabolic panel (BMP + Alb, Alk Phos, ALT, AST, Total. Bili, TP)     Status: Normal   Result Value Ref Range    Sodium 137 136 - 145 mmol/L    Potassium 4.4 3.5 - 5.0 mmol/L    Chloride 102 98 - 107 mmol/L    Carbon Dioxide (CO2) 24 22 - 31 mmol/L    Anion Gap 11 5 - 18 mmol/L    Urea Nitrogen 19 8 - 28 mg/dL    Creatinine 0.63 0.60 - 1.10 mg/dL    Calcium 9.4 8.5 - 10.5 mg/dL    Glucose 97 70 - 125 mg/dL    Alkaline Phosphatase 77 45 - 120 U/L    AST 21 0 - 40 U/L    ALT 18 0 - 45 U/L    Protein Total 6.8 6.0 - 8.0 g/dL    Albumin 4.2 3.5 - 5.0 g/dL    Bilirubin Total 0.2 0.0 - 1.0 mg/dL    GFR Estimate >90 >60 mL/min/1.73m2   Lipid Profile (Chol, Trig, HDL, LDL calc)     Status: Abnormal   Result Value Ref Range    Cholesterol 202 (H) <=199 mg/dL    Triglycerides 152 (H) <=149 mg/dL    Direct Measure HDL 52 >=50 mg/dL    LDL Cholesterol Calculated 120 <=129 mg/dL    Patient Fasting > 8hrs? Unknown    CBC with platelets     Status: Normal   Result Value Ref Range    WBC Count 6.5 4.0 - 11.0 10e3/uL    RBC Count 4.12 3.80 - 5.20 10e6/uL    Hemoglobin 13.4 11.7 - 15.7 g/dL    Hematocrit 40.4 35.0 - 47.0 %    MCV 98 78 - 100 fL     MCH 32.5 26.5 - 33.0 pg    MCHC 33.2 31.5 - 36.5 g/dL    RDW 12.6 10.0 - 15.0 %    Platelet Count 270 150 - 450 10e3/uL   Vitamin D Deficiency     Status: Abnormal   Result Value Ref Range    Vitamin D, Total (25-Hydroxy) 77 (H) 20 - 75 ug/L    Narrative    Season, race, dietary intake, and treatment affect the concentration of 25-hydroxy-Vitamin D. Values may decrease during winter months and increase during summer months. Values 20-29 ug/L may indicate Vitamin D insufficiency and values <20 ug/L may indicate Vitamin D deficiency.    Vitamin D determination is routinely performed by an immunoassay specific for 25 hydroxyvitamin D3.  If an individual is on vitamin D2(ergocalciferol) supplementation, please specify 25 OH vitamin D2 and D3 level determination by LCMSMS test VITD23.     Hemoglobin A1c     Status: Normal   Result Value Ref Range    Hemoglobin A1C 5.5 0.0 - 5.6 %       ASSESSMENT / PLAN:   .1. Adult general medical exam  This is a 70 yo female here for physical exam/AWV    2. Encounter for Medicare annual wellness exam  Here for AWV, reviewed HM  - REVIEW OF HEALTH MAINTENANCE PROTOCOL ORDERS    3. Lightheadedness  Complains of some lightheadedness - may be related to mild orthostatic changes - check labs  - Comprehensive metabolic panel (BMP + Alb, Alk Phos, ALT, AST, Total. Bili, TP); Future  - CBC with platelets; Future  - Comprehensive metabolic panel (BMP + Alb, Alk Phos, ALT, AST, Total. Bili, TP)  - CBC with platelets    4. Vitamin D deficiency  Takes Vitamin D supplement - check Vitamin D   - Vitamin D Deficiency; Future  - Vitamin D Deficiency    5. Lipid screening  Due for lipid screening - ordered  - Lipid Profile (Chol, Trig, HDL, LDL calc); Future  - Lipid Profile (Chol, Trig, HDL, LDL calc)    6. Hyperglycemia  Has h/o elevated blood sugar - recheck, check A1c  - Hemoglobin A1c; Future  - Hemoglobin A1c      Patient has been advised of split billing requirements and indicates understanding:  "Yes    COUNSELING:  Reviewed preventive health counseling, as reflected in patient instructions       Regular exercise       Healthy diet/nutrition    Estimated body mass index is 26.43 kg/m  as calculated from the following:    Height as of this encounter: 1.626 m (5' 4\").    Weight as of this encounter: 69.9 kg (154 lb).        She reports that she quit smoking about 42 years ago. Her smoking use included cigarettes and cigarettes. She has never used smokeless tobacco.      Appropriate preventive services were discussed with this patient, including applicable screening as appropriate for cardiovascular disease, diabetes, osteopenia/osteoporosis, and glaucoma.  As appropriate for age/gender, discussed screening for colorectal cancer, prostate cancer, breast cancer, and cervical cancer. Checklist reviewing preventive services available has been given to the patient.    Reviewed patients plan of care and provided an AVS. The Basic Care Plan (routine screening as documented in Health Maintenance) for Cliff meets the Care Plan requirement. This Care Plan has been established and reviewed with the Patient.    Counseling Resources:  ATP IV Guidelines  Pooled Cohorts Equation Calculator  Breast Cancer Risk Calculator  Breast Cancer: Medication to Reduce Risk  FRAX Risk Assessment  ICSI Preventive Guidelines  Dietary Guidelines for Americans, 2010  [employer]'s MyPlate  ASA Prophylaxis  Lung CA Screening    MARIUM KRUEGER MD  [facility]    Identified Health Risks:  "

## 2022-04-13 LAB — DEPRECATED CALCIDIOL+CALCIFEROL SERPL-MC: 77 UG/L (ref 20–75)

## 2022-04-17 ASSESSMENT — ENCOUNTER SYMPTOMS
ABDOMINAL PAIN: 0
CHILLS: 0
SHORTNESS OF BREATH: 0
WEAKNESS: 0
FEVER: 0

## 2022-04-18 ENCOUNTER — MYC MEDICAL ADVICE (OUTPATIENT)
Dept: FAMILY MEDICINE | Facility: CLINIC | Age: 72
End: 2022-04-18
Payer: COMMERCIAL

## 2022-04-20 DIAGNOSIS — E78.2 ELEVATED TRIGLYCERIDES WITH HIGH CHOLESTEROL: ICD-10-CM

## 2022-04-20 DIAGNOSIS — E55.9 VITAMIN D DEFICIENCY: Primary | ICD-10-CM

## 2022-04-20 NOTE — TELEPHONE ENCOUNTER
Called and reviewed with patient.  Will recheck Vitamin D and Lipids (fasting) in August/September.   Will decrease Vitamin D supplements (taking Vitamin D 2500 units x 2 and another Vitamin D3 tablet?) - will stop at least one of her 2500 unit tablets (or both over summer).      Discussed dry mouth - likely Sjogren's syndrome.  Using Xylimelt.

## 2022-04-25 ENCOUNTER — TRANSFERRED RECORDS (OUTPATIENT)
Dept: HEALTH INFORMATION MANAGEMENT | Facility: CLINIC | Age: 72
End: 2022-04-25
Payer: COMMERCIAL

## 2022-05-02 DIAGNOSIS — N39.0 URINARY TRACT INFECTION, SITE NOT SPECIFIED: ICD-10-CM

## 2022-05-02 DIAGNOSIS — R39.9 UTI SYMPTOMS: Primary | ICD-10-CM

## 2022-05-03 RX ORDER — AMOXICILLIN AND CLAVULANATE POTASSIUM 500; 125 MG/1; MG/1
1 TABLET, FILM COATED ORAL DAILY
Qty: 90 TABLET | Refills: 0 | Status: SHIPPED | OUTPATIENT
Start: 2022-05-03 | End: 2022-07-31

## 2022-05-05 RX ORDER — AMOXICILLIN AND CLAVULANATE POTASSIUM 500; 125 MG/1; MG/1
TABLET, FILM COATED ORAL
Qty: 90 TABLET | Refills: 3 | OUTPATIENT
Start: 2022-05-05

## 2022-05-05 NOTE — TELEPHONE ENCOUNTER
Refused as duplicate request.   Filled 5/3/2022 disp 90 no refills.   Antonina Gonzalez RN   05/05/22 2:08 PM  [facility] Nurse Advisor

## 2022-06-22 ENCOUNTER — MYC MEDICAL ADVICE (OUTPATIENT)
Dept: FAMILY MEDICINE | Facility: CLINIC | Age: 72
End: 2022-06-22

## 2022-06-24 DIAGNOSIS — N63.20 LEFT BREAST LUMP: Primary | ICD-10-CM

## 2022-06-24 DIAGNOSIS — M25.532 LEFT WRIST PAIN: ICD-10-CM

## 2022-06-24 DIAGNOSIS — Z91.89 OTHER SPECIFIED PERSONAL RISK FACTORS, NOT ELSEWHERE CLASSIFIED: ICD-10-CM

## 2022-06-24 DIAGNOSIS — N63.42 UNSPECIFIED LUMP IN LEFT BREAST, SUBAREOLAR: ICD-10-CM

## 2022-07-05 ENCOUNTER — HOSPITAL ENCOUNTER (OUTPATIENT)
Dept: MAMMOGRAPHY | Facility: CLINIC | Age: 72
Discharge: HOME | End: 2022-07-05
Attending: FAMILY MEDICINE
Payer: COMMERCIAL

## 2022-07-05 DIAGNOSIS — N63.20 LEFT BREAST LUMP: ICD-10-CM

## 2022-07-05 DIAGNOSIS — N63.42 UNSPECIFIED LUMP IN LEFT BREAST, SUBAREOLAR: ICD-10-CM

## 2022-07-05 DIAGNOSIS — Z91.89 OTHER SPECIFIED PERSONAL RISK FACTORS, NOT ELSEWHERE CLASSIFIED: ICD-10-CM

## 2022-07-05 PROCEDURE — 76642 ULTRASOUND BREAST LIMITED: CPT | Mod: LT

## 2022-07-05 PROCEDURE — 77066 DX MAMMO INCL CAD BI: CPT

## 2022-07-22 ENCOUNTER — VIRTUAL VISIT (OUTPATIENT)
Dept: FAMILY MEDICINE | Facility: CLINIC | Age: 72
End: 2022-07-22
Payer: COMMERCIAL

## 2022-07-22 DIAGNOSIS — U07.1 CLINICAL DIAGNOSIS OF COVID-19: Primary | ICD-10-CM

## 2022-07-22 PROCEDURE — 99213 OFFICE O/P EST LOW 20 MIN: CPT | Mod: CS | Performed by: NURSE PRACTITIONER

## 2022-07-22 ASSESSMENT — ENCOUNTER SYMPTOMS
HEADACHES: 1
COUGH: 1
FEVER: 0
FATIGUE: 1

## 2022-07-22 NOTE — PROGRESS NOTES
Cliff is a 72 year old who is being evaluated via a billable telephone visit.      What phone number would you like to be contacted at? [phone]  How would you like to obtain your AVS? Karlahartiffanie    Assessment & Plan     Clinical diagnosis of COVID-19  Discussed treatment options with patient. Paxlovid prescribed. Side effects, risks and benefits of medication were discussed with patient. Discussed how and when to take medication. Recommended monitoring BP 1-2 times per day while on Paxlovid. Follow-up with any questions or concerns.     - nirmatrelvir and ritonavir (PAXLOVID) therapy pack; Take 3 tablets by mouth 2 times daily for 5 days Take 2 Nirmatrelvir tablets and 1 Ritonavir tablet twice daily for 5 days.             There are no Patient Instructions on file for this visit.    No follow-ups on file.    KRYS Fong CNP  [facility] is a 72 year old, presenting for the following health issues:  Covid Concern      HPI     Started developing symptoms Tuesday and Wed she did a home test which showed positive.   How are you feeling today? Better  In the past 24 hours have you had shortness of breath when speaking, walking, or climbing stairs? I don't have breathing problems  Do you have a cough? Yes, I have a cough but it's not worse- had a barking cough but it is getting better  When is the last time you had a fever greater than 100? n/a  Are you having any other symptoms? Fatigue and Headaches   Do you have any other stressors you would like to discuss with your provider? No          Additional provider notes: Patient presents virtually with symptoms started 7/19/22 and tested positive for COVID 7/20/22.     Allergies   Allergen Reactions     Cephalexin Itching     Other Environmental Allergy      PN: LW CM1: >>> NO CONTRAST ADVERSE REACTION <<<     Reaction :  PN: LW CM1: >>> NO CONTRAST ADVERSE REACTION <<<     Reaction :       Current Outpatient Medications    Medication     amoxicillin-clavulanate (AUGMENTIN) 500-125 MG tablet     cetirizine HCl 10 MG CAPS     cholecalciferol 25 MCG (1000 UT) TABS     fluocinonide (LIDEX) 0.05 % external solution     No current facility-administered medications for this visit.       Past Medical History:   Diagnosis Date     Arthritis     OSTEOARTHRITIS BOTH SHOULDERS     Osteoporosis      Peripheral neuropathy      Sjogren's syndrome (H)      Stress incontinence           Review of Systems   Constitutional: Positive for fatigue. Negative for fever.   Respiratory: Positive for cough.    Neurological: Positive for headaches.            Objective           Vitals:  No vitals were obtained today due to virtual visit.    Physical Exam   alert, no distress and cooperative  PSYCH: Alert and oriented times 3; coherent speech, normal   rate and volume, able to articulate logical thoughts, able   to abstract reason, no tangential thoughts, no hallucinations   or delusions  Her affect is normal and pleasant  RESP: No cough, no audible wheezing, able to talk in full sentences  Remainder of exam unable to be completed due to telephone visits                Phone call duration: 8 minutes    .  ..

## 2022-07-30 DIAGNOSIS — R39.9 UTI SYMPTOMS: ICD-10-CM

## 2022-07-31 RX ORDER — AMOXICILLIN AND CLAVULANATE POTASSIUM 500; 125 MG/1; MG/1
TABLET, FILM COATED ORAL
Qty: 90 TABLET | Refills: 0 | Status: SHIPPED | OUTPATIENT
Start: 2022-07-31 | End: 2022-11-01

## 2022-07-31 NOTE — TELEPHONE ENCOUNTER
Routing refill request to provider for review/approval because:  Drug not on the [facility] refill protocol     Last Written Prescription Date:  5/3/22  Last Fill Quantity: 90,  # refills: 0   Last office visit provider:  4/12/22     Requested Prescriptions   Pending Prescriptions Disp Refills     amoxicillin-clavulanate (AUGMENTIN) 500-125 MG tablet [Pharmacy Med Name: AMOX-CLAV 500-125 MG TABLET] 90 tablet 0     Sig: TAKE 1 TABLET BY MOUTH EVERY DAY       There is no refill protocol information for this order          Karolina Sahni RN 07/31/22 11:03 AM

## 2022-08-01 ENCOUNTER — TELEPHONE (OUTPATIENT)
Dept: FAMILY MEDICINE | Facility: CLINIC | Age: 72
End: 2022-08-01

## 2022-08-01 DIAGNOSIS — R05.9 COUGH: Primary | ICD-10-CM

## 2022-08-01 NOTE — TELEPHONE ENCOUNTER
Reason for Call:  Other COVID antigen test    Detailed comments: Patient called to ask provider to put in order for a COVID antigen test. She tested positive a few weeks ago and her test results are still coming back positive. She was prescribed medication but is still having cold like symptoms. Please call patient to advise if she should get the antigen test.    Phone Number Patient can be reached at: Home number on file [phone] (home)    Best Time: any    Can we leave a detailed message on this number? YES    Call taken on 8/1/2022 at 8:17 AM by Danette Rothman

## 2022-08-02 ENCOUNTER — LAB (OUTPATIENT)
Dept: FAMILY MEDICINE | Facility: CLINIC | Age: 72
End: 2022-08-02
Attending: FAMILY MEDICINE
Payer: COMMERCIAL

## 2022-08-02 DIAGNOSIS — R05.9 COUGH: ICD-10-CM

## 2022-08-02 LAB — SARS-COV-2 RNA RESP QL NAA+PROBE: POSITIVE

## 2022-08-02 PROCEDURE — U0005 INFEC AGEN DETEC AMPLI PROBE: HCPCS

## 2022-08-02 PROCEDURE — U0003 INFECTIOUS AGENT DETECTION BY NUCLEIC ACID (DNA OR RNA); SEVERE ACUTE RESPIRATORY SYNDROME CORONAVIRUS 2 (SARS-COV-2) (CORONAVIRUS DISEASE [COVID-19]), AMPLIFIED PROBE TECHNIQUE, MAKING USE OF HIGH THROUGHPUT TECHNOLOGIES AS DESCRIBED BY CMS-2020-01-R: HCPCS

## 2022-08-21 ENCOUNTER — MYC MEDICAL ADVICE (OUTPATIENT)
Dept: FAMILY MEDICINE | Facility: CLINIC | Age: 72
End: 2022-08-21

## 2022-08-22 ENCOUNTER — TELEPHONE (OUTPATIENT)
Dept: FAMILY MEDICINE | Facility: CLINIC | Age: 72
End: 2022-08-22

## 2022-08-22 ENCOUNTER — OFFICE VISIT (OUTPATIENT)
Dept: AUDIOLOGY | Facility: CLINIC | Age: 72
End: 2022-08-22
Payer: COMMERCIAL

## 2022-08-22 DIAGNOSIS — F41.9 ANXIETY: Primary | ICD-10-CM

## 2022-08-22 DIAGNOSIS — G47.00 INSOMNIA, UNSPECIFIED TYPE: ICD-10-CM

## 2022-08-22 DIAGNOSIS — H90.3 SENSORINEURAL HEARING LOSS, BILATERAL: Primary | ICD-10-CM

## 2022-08-22 PROCEDURE — 92593 PR HEARING AID CHECK, BINAURAL: CPT | Performed by: AUDIOLOGIST

## 2022-08-22 RX ORDER — ALPRAZOLAM 0.5 MG
0.5 TABLET ORAL DAILY PRN
Qty: 15 TABLET | Refills: 0 | Status: SHIPPED | OUTPATIENT
Start: 2022-08-22 | End: 2022-11-23

## 2022-08-22 NOTE — TELEPHONE ENCOUNTER
Reason for Call:  Other prescription    Detailed comments: Pt called and would like a RX to Xanax because she is having trouble sleeping. Please send RX to [facility] on [address] if appropriate.     Phone Number Patient can be reached at: Home number on file [phone] (home)    Best Time: any    Can we leave a detailed message on this number? YES    Call taken on 8/22/2022 at 8:38 AM by Theodore Galvan

## 2022-08-22 NOTE — PROGRESS NOTES
AUDIOLOGY REPORT    SUBJECTIVE: Cliff Dai, 72 y.o. year old female , was seen on 08/22/22 for a hearing aid check. Cliff was fit with bilateral Phonak Audeo M70-R ALEJANDRO hearing aids on 7/23/2020. Her hearing was last assessed on 7/6/2020 and results revealed normal sloping to moderately severe sensorineural hearing loss bilaterally.     Today, Cliff reports that she needs to have her hearing aids cleaned. She states that she sometimes struggles to hear her  or to hear in noisy settings. Cliff reports that her right hearing aid sometimes has a red flashing light when she places it on the . She states the right aid sometimes plays the power on sound randomly.    OBJECTIVE: The hearing aids were cleaned and checked. The listening check found both devices to have good sound quality. Cliff is shown how to replace the wax traps on the hearing aids and she is able to do this independently. The cap domes are replaced as well. Cliff is provided additional wax traps and cap domes.    The overall gain of the hearing aids is increased from 100% to 110% plus an additional 3 dB. Cliff reports the new volume setting is comfortable.     A manual speech in noise program is added to the hearing aids. Cliff is told how to manually change the hearing aid program.     Cliff is told that the hearing aids can be paired to her phone and they can be adjusted with the Plizy abhay. She isn't interested in pairing them with her phone today.    Cliff is told that her hearing aids are under warranty until 10/9/23. If she continues to have issues with her right hearing aid she may consider sending it out for repair.    ASSESSMENT: Hearing aid cleaning and check completed. Changes to hearing aid were made as listed above.     PLAN: Cliff should return for hearing aid checks as needed. Please call our clinic at [phone] with questions or concerns.    Ralf Waddell, [facility]  [place] Licensed Audiologist #[id number]

## 2022-08-22 NOTE — TELEPHONE ENCOUNTER
[facility] today and has rescheduled her lab appointment.     No further action needed at this time.         Yun Urias RN  [facility]

## 2022-08-23 NOTE — TELEPHONE ENCOUNTER
I think she is well aware of this, but make sure she knows this is a short-term prescription.  Not meant for long-term use for sleep (or any other diagnosis for that matter).

## 2022-10-22 ENCOUNTER — HEALTH MAINTENANCE LETTER (OUTPATIENT)
Age: 72
End: 2022-10-22

## 2022-10-30 DIAGNOSIS — R39.9 UTI SYMPTOMS: ICD-10-CM

## 2022-10-31 NOTE — TELEPHONE ENCOUNTER
Routing refill request to provider for review/approval because:  Drug not on the [facility] refill protocol     Last Written Prescription Date:  07/31/2022  Last Fill Quantity: 90 tablet,  # refills: 0   Last office visit provider:   4/12/2022    Requested Prescriptions   Pending Prescriptions Disp Refills     amoxicillin-clavulanate (AUGMENTIN) 500-125 MG tablet [Pharmacy Med Name: AMOX-CLAV 500-125 MG TABLET] 90 tablet 0     Sig: TAKE 1 TABLET BY MOUTH EVERY DAY       There is no refill protocol information for this order          Leobardo Rodriguez RN 10/31/22 9:42 AM

## 2022-11-01 RX ORDER — AMOXICILLIN AND CLAVULANATE POTASSIUM 500; 125 MG/1; MG/1
TABLET, FILM COATED ORAL
Qty: 90 TABLET | Refills: 0 | Status: SHIPPED | OUTPATIENT
Start: 2022-11-01 | End: 2023-07-10

## 2022-11-16 ENCOUNTER — IMMUNIZATION (OUTPATIENT)
Dept: FAMILY MEDICINE | Facility: CLINIC | Age: 72
End: 2022-11-16
Payer: COMMERCIAL

## 2022-11-16 PROCEDURE — 91312 COVID-19,PF,PFIZER BOOSTER BIVALENT: CPT

## 2022-11-16 PROCEDURE — 0124A COVID-19,PF,PFIZER BOOSTER BIVALENT: CPT

## 2022-11-23 ENCOUNTER — LAB (OUTPATIENT)
Dept: LAB | Facility: CLINIC | Age: 72
End: 2022-11-23
Payer: COMMERCIAL

## 2022-11-23 DIAGNOSIS — E55.9 VITAMIN D DEFICIENCY: Primary | ICD-10-CM

## 2022-11-23 DIAGNOSIS — E78.2 MIXED HYPERLIPIDEMIA: ICD-10-CM

## 2022-11-23 DIAGNOSIS — E55.9 VITAMIN D DEFICIENCY: ICD-10-CM

## 2022-11-23 LAB
CHOLEST SERPL-MCNC: 169 MG/DL
DEPRECATED CALCIDIOL+CALCIFEROL SERPL-MC: 77 UG/L (ref 20–75)
HDLC SERPL-MCNC: 63 MG/DL
LDLC SERPL CALC-MCNC: 89 MG/DL
NONHDLC SERPL-MCNC: 106 MG/DL
TRIGL SERPL-MCNC: 84 MG/DL

## 2022-11-23 PROCEDURE — 80061 LIPID PANEL: CPT

## 2022-11-23 PROCEDURE — 36415 COLL VENOUS BLD VENIPUNCTURE: CPT

## 2022-11-23 PROCEDURE — 82306 VITAMIN D 25 HYDROXY: CPT

## 2023-02-07 ENCOUNTER — TRANSFERRED RECORDS (OUTPATIENT)
Dept: HEALTH INFORMATION MANAGEMENT | Facility: CLINIC | Age: 73
End: 2023-02-07

## 2023-03-15 ENCOUNTER — OFFICE VISIT (OUTPATIENT)
Dept: FAMILY MEDICINE | Facility: CLINIC | Age: 73
End: 2023-03-15
Payer: COMMERCIAL

## 2023-03-15 VITALS
HEART RATE: 72 BPM | RESPIRATION RATE: 18 BRPM | OXYGEN SATURATION: 98 % | SYSTOLIC BLOOD PRESSURE: 118 MMHG | DIASTOLIC BLOOD PRESSURE: 72 MMHG

## 2023-03-15 DIAGNOSIS — E55.9 VITAMIN D DEFICIENCY: ICD-10-CM

## 2023-03-15 DIAGNOSIS — R41.3 MEMORY LOSS: Primary | ICD-10-CM

## 2023-03-15 LAB
ALBUMIN SERPL BCG-MCNC: 4.5 G/DL (ref 3.5–5.2)
ALP SERPL-CCNC: 79 U/L (ref 35–104)
ALT SERPL W P-5'-P-CCNC: 16 U/L (ref 10–35)
ANION GAP SERPL CALCULATED.3IONS-SCNC: 11 MMOL/L (ref 7–15)
AST SERPL W P-5'-P-CCNC: 24 U/L (ref 10–35)
BASOPHILS # BLD AUTO: 0.1 10E3/UL (ref 0–0.2)
BASOPHILS NFR BLD AUTO: 1 %
BILIRUB SERPL-MCNC: 0.3 MG/DL
BUN SERPL-MCNC: 24.2 MG/DL (ref 8–23)
CALCIUM SERPL-MCNC: 9.4 MG/DL (ref 8.8–10.2)
CHLORIDE SERPL-SCNC: 102 MMOL/L (ref 98–107)
CREAT SERPL-MCNC: 0.55 MG/DL (ref 0.51–0.95)
DEPRECATED CALCIDIOL+CALCIFEROL SERPL-MC: 69 UG/L (ref 20–75)
DEPRECATED HCO3 PLAS-SCNC: 24 MMOL/L (ref 22–29)
EOSINOPHIL # BLD AUTO: 0.3 10E3/UL (ref 0–0.7)
EOSINOPHIL NFR BLD AUTO: 4 %
ERYTHROCYTE [DISTWIDTH] IN BLOOD BY AUTOMATED COUNT: 12.8 % (ref 10–15)
GFR SERPL CREATININE-BSD FRML MDRD: >90 ML/MIN/1.73M2
GLUCOSE SERPL-MCNC: 104 MG/DL (ref 70–99)
HCT VFR BLD AUTO: 39.6 % (ref 35–47)
HGB BLD-MCNC: 12.9 G/DL (ref 11.7–15.7)
IMM GRANULOCYTES # BLD: 0 10E3/UL
IMM GRANULOCYTES NFR BLD: 0 %
LYMPHOCYTES # BLD AUTO: 2.1 10E3/UL (ref 0.8–5.3)
LYMPHOCYTES NFR BLD AUTO: 25 %
MAGNESIUM SERPL-MCNC: 2.3 MG/DL (ref 1.7–2.3)
MCH RBC QN AUTO: 32.4 PG (ref 26.5–33)
MCHC RBC AUTO-ENTMCNC: 32.6 G/DL (ref 31.5–36.5)
MCV RBC AUTO: 100 FL (ref 78–100)
MONOCYTES # BLD AUTO: 0.7 10E3/UL (ref 0–1.3)
MONOCYTES NFR BLD AUTO: 9 %
NEUTROPHILS # BLD AUTO: 5.2 10E3/UL (ref 1.6–8.3)
NEUTROPHILS NFR BLD AUTO: 62 %
PLATELET # BLD AUTO: 266 10E3/UL (ref 150–450)
POTASSIUM SERPL-SCNC: 4.6 MMOL/L (ref 3.4–5.3)
PROT SERPL-MCNC: 6.9 G/DL (ref 6.4–8.3)
RBC # BLD AUTO: 3.98 10E6/UL (ref 3.8–5.2)
SODIUM SERPL-SCNC: 137 MMOL/L (ref 136–145)
T4 FREE SERPL-MCNC: 1.35 NG/DL (ref 0.9–1.7)
TSH SERPL DL<=0.005 MIU/L-ACNC: 2.31 UIU/ML (ref 0.3–4.2)
VIT B12 SERPL-MCNC: 451 PG/ML (ref 232–1245)
WBC # BLD AUTO: 8.4 10E3/UL (ref 4–11)

## 2023-03-15 PROCEDURE — 82607 VITAMIN B-12: CPT | Performed by: FAMILY MEDICINE

## 2023-03-15 PROCEDURE — 84443 ASSAY THYROID STIM HORMONE: CPT | Performed by: FAMILY MEDICINE

## 2023-03-15 PROCEDURE — 85025 COMPLETE CBC W/AUTO DIFF WBC: CPT | Performed by: FAMILY MEDICINE

## 2023-03-15 PROCEDURE — 84439 ASSAY OF FREE THYROXINE: CPT | Performed by: FAMILY MEDICINE

## 2023-03-15 PROCEDURE — 82306 VITAMIN D 25 HYDROXY: CPT | Performed by: FAMILY MEDICINE

## 2023-03-15 PROCEDURE — 2894A CBC WITH PLATELETS AND DIFFERENTIAL: CPT | Performed by: FAMILY MEDICINE

## 2023-03-15 PROCEDURE — 80053 COMPREHEN METABOLIC PANEL: CPT | Performed by: FAMILY MEDICINE

## 2023-03-15 PROCEDURE — 36415 COLL VENOUS BLD VENIPUNCTURE: CPT | Performed by: FAMILY MEDICINE

## 2023-03-15 PROCEDURE — 83735 ASSAY OF MAGNESIUM: CPT | Performed by: FAMILY MEDICINE

## 2023-03-15 PROCEDURE — 99214 OFFICE O/P EST MOD 30 MIN: CPT | Performed by: FAMILY MEDICINE

## 2023-03-15 NOTE — PROGRESS NOTES
"1. Memory loss  This is a 73 yo female who has had fairly \"sudden\" memory issues - notable in last 6 months or so per family report; this does coincide with significant stress for patient (she and  are moving into a new condo which they are helping design/remodel before moving in - move in date is in next couple weeks).  Patient has been attending to details for this build - and apparently has done okay with this - with help from her decorator and movers and builders.  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  Daughters have noted some fall off in her mentation.  Patient is aware of some of this.  We have reviewed personal history, family history, symptoms per family ( is here to relay family concerns).  Will refer to Neurology, will also try to get neuropsych testing done.  Will check labs and will get CT scan to rule out any sort of space-occupying lesion that may be problematic.  We have had lengthy discussion regarding all of this today.  We discussed mimics of dementia including hearing loss(she does have at least mild hearing loss and wears hearing aids), and depression (it is not clear if her low mood today is due to her dementia or if the memory issues are related to her mood).    - Adult Neurology  Referral; Future  - Adult Neuropsychology Referral; Future  - TSH; Future  - T4, free; Future  - Vitamin B12; Future  - Comprehensive metabolic panel (BMP + Alb, Alk Phos, ALT, AST, Total. Bili, TP); Future  - Magnesium; Future  - CT Head w/o Contrast; Future  - CBC with Platelets & Differential; Future  - TSH  - T4, free  - Vitamin B12  - Comprehensive metabolic panel (BMP + Alb, Alk Phos, ALT, AST, Total. Bili, TP)  - Magnesium  - CBC with Platelets & Differential    2. Vitamin D deficiency  H/o Vitamin D deficiency - check levels.    - Vitamin D Deficiency; Future  - Vitamin D Deficiency      Ethan Coronado is a 72 " year old accompanied by her spouse, presenting for the following health issues:  Memory Loss (Having trouble remembering things x6 months) and Vitamin Deficiency (Would like to get level redrawn)      Patient's  notes:  Cliff forgets having had a conversation   Daughters have noticed as well  No getting lost -     Cliff has noticed that she has general stress level  Having to remember things  Moving to a new place in mid April -   A lot of pressure/details  Takes copious notes -   Requested a Xanax rx back then because of anxiety -   Took about 1/2 pill -   Feels, in general, stressed by all the changes - lots of things that have     Stories - fast moving and many characters are hard to keep track of  Work stories/kid stories     Less capacity to deal with stress/details    Just read a mystery novel - had to take notes to keep track of things/characters    Sister - 10 months older - 2019 had evaluation (cognitive)  Recently - 2022 - had another one - came out normal in first one; recent is lesser performing (compared to age group)  Mom - memory issues - began in 70s in some regard    Hearing - wears hearing aids -   Has a  - working with ambulation/strength -     Has gluten intolerance - gets frog in throat feeling -           History of Present Illness       Reason for visit:  Memory issues  Symptom onset:  More than a month  Symptoms include:  Not remembering details  Symptom intensity:  Mild    She eats 2-3 servings of fruits and vegetables daily.She consumes 0 sweetened beverage(s) daily.She exercises with enough effort to increase her heart rate 9 or less minutes per day.    She is taking medications regularly.             Review of Systems   Constitutional: Positive for fatigue. Negative for activity change, appetite change, chills and fever.   HENT: Positive for hearing loss (wears hearing aids).    Eyes: Negative for visual disturbance.   Respiratory: Negative for cough and shortness of breath.     Cardiovascular: Negative for chest pain and peripheral edema.   Gastrointestinal: Negative for abdominal pain.   Endocrine: Negative for polydipsia and polyuria.   Genitourinary: Negative.    Musculoskeletal: Positive for arthralgias.   Skin: Negative.    Allergic/Immunologic: Negative.    Neurological:        Memory issues -    Hematological: Does not bruise/bleed easily.   Psychiatric/Behavioral:        Memory issues - less attention to detail   All other systems reviewed and are negative.           Objective    /72 (BP Location: Right arm, Patient Position: Sitting, Cuff Size: Adult Regular)   Pulse 72   Resp 18   LMP  (LMP Unknown)   SpO2 98%   There is no height or weight on file to calculate BMI.  Physical Exam  Vitals reviewed.   Constitutional:       General: She is not in acute distress.     Appearance: Normal appearance.   HENT:      Head: Normocephalic.      Ears:      Comments: Bilateral hearing aids.      Nose: Nose normal.      Mouth/Throat:      Mouth: Mucous membranes are moist.      Pharynx: No posterior oropharyngeal erythema.   Eyes:      Extraocular Movements: Extraocular movements intact.      Conjunctiva/sclera: Conjunctivae normal.      Pupils: Pupils are equal, round, and reactive to light.   Cardiovascular:      Rate and Rhythm: Normal rate and regular rhythm.      Pulses: Normal pulses.      Heart sounds: Normal heart sounds. No murmur heard.  Pulmonary:      Effort: Pulmonary effort is normal.      Breath sounds: Normal breath sounds.   Abdominal:      Palpations: Abdomen is soft. There is no mass.      Tenderness: There is no abdominal tenderness. There is no guarding or rebound.   Genitourinary:     Comments: External genitalia normal  Musculoskeletal:         General: No deformity. Normal range of motion.      Cervical back: Normal range of motion and neck supple.   Lymphadenopathy:      Cervical: No cervical adenopathy.   Skin:     General: Skin is warm and dry.    Neurological:      General: No focal deficit present.      Mental Status: She is alert and oriented to person, place, and time.      Cranial Nerves: No cranial nerve deficit.      Sensory: No sensory deficit.      Motor: No weakness.      Gait: Gait normal.      Deep Tendon Reflexes: Reflexes normal.   Psychiatric:         Mood and Affect: Mood normal.         Behavior: Behavior normal.      Comments: Critical thinking is more impaired than previous meetings with this patient - previously very sharp and on point; now very tentative             Results for orders placed or performed in visit on 03/15/23   TSH     Status: Normal   Result Value Ref Range    TSH 2.31 0.30 - 4.20 uIU/mL   T4, free     Status: Normal   Result Value Ref Range    Free T4 1.35 0.90 - 1.70 ng/dL   Vitamin B12     Status: Normal   Result Value Ref Range    Vitamin B12 451 232 - 1,245 pg/mL   Comprehensive metabolic panel (BMP + Alb, Alk Phos, ALT, AST, Total. Bili, TP)     Status: Abnormal   Result Value Ref Range    Sodium 137 136 - 145 mmol/L    Potassium 4.6 3.4 - 5.3 mmol/L    Chloride 102 98 - 107 mmol/L    Carbon Dioxide (CO2) 24 22 - 29 mmol/L    Anion Gap 11 7 - 15 mmol/L    Urea Nitrogen 24.2 (H) 8.0 - 23.0 mg/dL    Creatinine 0.55 0.51 - 0.95 mg/dL    Calcium 9.4 8.8 - 10.2 mg/dL    Glucose 104 (H) 70 - 99 mg/dL    Alkaline Phosphatase 79 35 - 104 U/L    AST 24 10 - 35 U/L    ALT 16 10 - 35 U/L    Protein Total 6.9 6.4 - 8.3 g/dL    Albumin 4.5 3.5 - 5.2 g/dL    Bilirubin Total 0.3 <=1.2 mg/dL    GFR Estimate >90 >60 mL/min/1.73m2   Magnesium     Status: Normal   Result Value Ref Range    Magnesium 2.3 1.7 - 2.3 mg/dL   Vitamin D Deficiency     Status: Normal   Result Value Ref Range    Vitamin D, Total (25-Hydroxy) 69 20 - 75 ug/L    Narrative    Season, race, dietary intake, and treatment affect the concentration of 25-hydroxy-Vitamin D. Values may decrease during winter months and increase during summer months. Values 20-29 ug/L  may indicate Vitamin D insufficiency and values <20 ug/L may indicate Vitamin D deficiency.    Vitamin D determination is routinely performed by an immunoassay specific for 25 hydroxyvitamin D3.  If an individual is on vitamin D2(ergocalciferol) supplementation, please specify 25 OH vitamin D2 and D3 level determination by LCMSMS test VITD23.     CBC with platelets and differential     Status: None   Result Value Ref Range    WBC Count 8.4 4.0 - 11.0 10e3/uL    RBC Count 3.98 3.80 - 5.20 10e6/uL    Hemoglobin 12.9 11.7 - 15.7 g/dL    Hematocrit 39.6 35.0 - 47.0 %     78 - 100 fL    MCH 32.4 26.5 - 33.0 pg    MCHC 32.6 31.5 - 36.5 g/dL    RDW 12.8 10.0 - 15.0 %    Platelet Count 266 150 - 450 10e3/uL    % Neutrophils 62 %    % Lymphocytes 25 %    % Monocytes 9 %    % Eosinophils 4 %    % Basophils 1 %    % Immature Granulocytes 0 %    Absolute Neutrophils 5.2 1.6 - 8.3 10e3/uL    Absolute Lymphocytes 2.1 0.8 - 5.3 10e3/uL    Absolute Monocytes 0.7 0.0 - 1.3 10e3/uL    Absolute Eosinophils 0.3 0.0 - 0.7 10e3/uL    Absolute Basophils 0.1 0.0 - 0.2 10e3/uL    Absolute Immature Granulocytes 0.0 <=0.4 10e3/uL   CBC with Platelets & Differential     Status: None    Narrative    The following orders were created for panel order CBC with Platelets & Differential.  Procedure                               Abnormality         Status                     ---------                               -----------         ------                     CBC with platelets and d...[[id number]]                      Final result                 Please view results for these tests on the individual orders.

## 2023-03-19 ASSESSMENT — ENCOUNTER SYMPTOMS
COUGH: 0
CHILLS: 0
SHORTNESS OF BREATH: 0
ARTHRALGIAS: 1
FEVER: 0
APPETITE CHANGE: 0
ALLERGIC/IMMUNOLOGIC NEGATIVE: 1
ACTIVITY CHANGE: 0
BRUISES/BLEEDS EASILY: 0
ABDOMINAL PAIN: 0
FATIGUE: 1
POLYDIPSIA: 0

## 2023-04-05 ENCOUNTER — NURSE TRIAGE (OUTPATIENT)
Dept: NURSING | Facility: CLINIC | Age: 73
End: 2023-04-05
Payer: COMMERCIAL

## 2023-04-05 NOTE — TELEPHONE ENCOUNTER
Nurse Triage SBAR    Is this a 2nd Level Triage? NO    Situation: Patient calling with tenderness in breast.  Consent: not needed    Background: seen in orthopedic  on Friday night because she was having intense pain 10/10 running down her L arm on the top of the arm and bottom.  States she had a shoulder replacement in that arm a couple years ago.  Went to Orthopedic .  Had no chest pain at the time.  This morning, was taking a shower and doing a breast check and states it's very sore on the left side on outside of breast.      Assessment:   * Arm pain is gone today pain is a 0/10 but pt notes there is less strength in that arm today.   Noting some breast pain.   * Outside - side on left breast.  Sore - not painful.  If pressing on it, it's sore and the other side doesn't feel like this.    * Denies any history of problems with breasts.    * Unsure what is causing this.   Pt states she has dense breast tissue.        Protocol Recommended Disposition:   See in clinic within 3 days     Recommendation: Advised patient to make an appointment. Transferred to scheduling. . Reviewed concerning symptoms and when to call back.       Yenifer Ruelas RN [facility] 4/5/2023 9:46 AM    Reason for Disposition    Patient wants to be seen    Additional Information    Negative: Chest pain    Negative: Breastfeeding questions about baby    Negative: Breastfeeding questions about mother (breast symptoms or feeling sick)    Negative: Breastfeeding questions about mother's medicines and diet    Negative: Postpartum breast pain and swelling, not breastfeeding    Negative: Small spot, skin growth or mole    Negative: SEVERE breast pain and fever > 103 F  (39.4 C)    Negative: Patient sounds very sick or weak to the triager    Negative: Breast looks infected (spreading redness, feels hot or painful to touch) and fever    Negative: Breast looks infected (spreading redness, feels hot or painful to touch) and no  "fever    Negative: Painful rash and multiple small blisters grouped together (i.e., dermatomal distribution or \"band\" or \"stripe\")    Negative: Cuts, burns, or bruises of breasts and suspicious history for the injury    Negative: Breast lump    Negative: Nipple discharge and bloody    Negative: Nipple is inverted (i.e., points inward)  (Exception: long-term physical characteristic, present for many years)    Negative: Dry flaking-peeling skin of nipple    Negative: Change in shape or appearance of breast    Negative: Dimpling of skin of breast (i.e., skin looks like the outside of an orange peel)    Protocols used: BREAST SYMPTOMS-A-[place]      "

## 2023-04-07 ENCOUNTER — ANCILLARY PROCEDURE (OUTPATIENT)
Dept: GENERAL RADIOLOGY | Facility: CLINIC | Age: 73
End: 2023-04-07
Payer: COMMERCIAL

## 2023-04-07 ENCOUNTER — OFFICE VISIT (OUTPATIENT)
Dept: FAMILY MEDICINE | Facility: CLINIC | Age: 73
End: 2023-04-07
Payer: COMMERCIAL

## 2023-04-07 VITALS
RESPIRATION RATE: 15 BRPM | HEART RATE: 79 BPM | TEMPERATURE: 98.8 F | SYSTOLIC BLOOD PRESSURE: 96 MMHG | OXYGEN SATURATION: 96 % | DIASTOLIC BLOOD PRESSURE: 63 MMHG | HEIGHT: 65 IN | BODY MASS INDEX: 23.82 KG/M2 | WEIGHT: 143 LBS

## 2023-04-07 DIAGNOSIS — M54.12 CERVICAL RADICULOPATHY: Primary | ICD-10-CM

## 2023-04-07 DIAGNOSIS — M54.12 CERVICAL RADICULOPATHY: ICD-10-CM

## 2023-04-07 PROCEDURE — 99213 OFFICE O/P EST LOW 20 MIN: CPT | Performed by: FAMILY MEDICINE

## 2023-04-07 PROCEDURE — 72040 X-RAY EXAM NECK SPINE 2-3 VW: CPT | Mod: TC | Performed by: RADIOLOGY

## 2023-04-07 NOTE — PROGRESS NOTES
"  Assessment & Plan     Cervical radiculopathy  No red flags. No trauma. She has a diagnosis of cervical pain in the chart from 2016. She reports having a fair amount of arthritis, but not specifically in the neck. She had her left shoulder replaced a few years ago. Symptoms are not as bad as the first hours, but not resolving. She declines pain medication.  - XR Cervical Spine 2/3 Views  We may need an MRI.    GRISELDA LEON MD  [facility]    Ethan Coronado is a 72 year old, presenting for the following health issues:  Office Visit (Left arm pain from shoulder down arm and under arm pit.) and Recheck Medication        4/7/2023    12:54 PM   Additional Questions   Roomed by javon ibrahim   Accompanied by self         4/7/2023    12:54 PM   Patient Reported Additional Medications   Patient reports taking the following new medications none      Constitutional, HEENT, cardiovascular, pulmonary, GI, , musculoskeletal, neuro, skin, endocrine and psych systems are negative, except as otherwise noted.      Objective    BP 96/63 (BP Location: Left arm, Patient Position: Sitting, Cuff Size: Adult Regular)   Pulse 79   Temp 98.8  F (37.1  C) (Tympanic)   Resp 15   Ht 1.651 m (5' 5\")   Wt 64.9 kg (143 lb)   LMP  (LMP Unknown)   SpO2 96%   BMI 23.80 kg/m      Physical Exam   GENERAL: healthy, alert and no distress  HENT: ear canals and TM's normal, nose and mouth without ulcers or lesions  NECK: no adenopathy, no asymmetry, masses, or scars and thyroid normal to palpation. FROM  RESP: lungs clear to auscultation - no rales, rhonchi or wheezes  CV: regular rate and rhythm, normal S1 S2, no S3 or S4, no murmur, click or rub, no peripheral edema and peripheral pulses strong  MS: no gross musculoskeletal defects noted, no edema  NEURO: Normal strength and tone, mentation intact and speech and sensation normal    No results found for this or any previous visit (from the past 24 hour(s)).    "   Answers for HPI/ROS submitted by the patient on 4/7/2023  What is the reason for your visit today? : arm pain  How many servings of fruits and vegetables do you eat daily?: 2-3  On average, how many sweetened beverages do you drink each day (Examples: soda, juice, sweet tea, etc.  Do NOT count diet or artificially sweetened beverages)?: 0  How many minutes a day do you exercise enough to make your heart beat faster?: 60 or more  How many days a week do you exercise enough to make your heart beat faster?: 4  How many days per week do you miss taking your medication?: 0

## 2023-04-20 ENCOUNTER — PATIENT OUTREACH (OUTPATIENT)
Dept: CARE COORDINATION | Facility: CLINIC | Age: 73
End: 2023-04-20
Payer: COMMERCIAL

## 2023-05-01 ENCOUNTER — TELEPHONE (OUTPATIENT)
Dept: FAMILY MEDICINE | Facility: CLINIC | Age: 73
End: 2023-05-01
Payer: COMMERCIAL

## 2023-05-01 NOTE — TELEPHONE ENCOUNTER
General Call      Reason for Call:  Covid vaccine questions     What are your questions or concerns:  Pt have questions about the new covid vaccine and would like a call back. Please follow back up with pt at your earliest convenience. Thank you.          Could we send this information to you in [employer] or would you prefer to receive a phone call?:   Patient would prefer a phone call   Okay to leave a detailed message?: Yes at Home number on file [phone] (home)

## 2023-05-01 NOTE — TELEPHONE ENCOUNTER
Informed pt that as long as it has been 4 months since her first one she can get the next one, not a new vaccine.     She will call back to schedule.

## 2023-06-01 ENCOUNTER — HEALTH MAINTENANCE LETTER (OUTPATIENT)
Age: 73
End: 2023-06-01

## 2023-07-06 ENCOUNTER — TELEPHONE (OUTPATIENT)
Dept: FAMILY MEDICINE | Facility: CLINIC | Age: 73
End: 2023-07-06

## 2023-07-06 ENCOUNTER — HOSPITAL ENCOUNTER (OUTPATIENT)
Dept: MAMMOGRAPHY | Facility: CLINIC | Age: 73
Discharge: HOME | End: 2023-07-06
Attending: FAMILY MEDICINE | Admitting: FAMILY MEDICINE
Payer: COMMERCIAL

## 2023-07-06 DIAGNOSIS — Z12.31 VISIT FOR SCREENING MAMMOGRAM: ICD-10-CM

## 2023-07-06 PROCEDURE — 77067 SCR MAMMO BI INCL CAD: CPT

## 2023-07-06 NOTE — TELEPHONE ENCOUNTER
"  Test Results    Contacts       Type Contact Phone/Fax    07/06/2023 04:26 PM CDT Phone (Incoming) Cliff Dai (Self) [phone] (M)          Who ordered the test:  Dr. Infante     Type of test: Mammogram    Date of test:  7/6/23    Where was the test performed:  [facility]    What are your questions/concerns? [facility] but she was unable to see them. Called RN to discuss. Results below were discussed with patient:    \"Findings: The breasts have scattered areas of fibroglandular density.  There is no radiographic evidence of malignancy.     RECOMMENDED FOLLOW-UP: Annual routine screening mammogram\"    Will route to PCP for further interpretation of results if necessary.     Coty Guillen RN BSN  [facility]      "

## 2023-07-10 ENCOUNTER — OFFICE VISIT (OUTPATIENT)
Dept: FAMILY MEDICINE | Facility: CLINIC | Age: 73
End: 2023-07-10
Payer: COMMERCIAL

## 2023-07-10 VITALS
TEMPERATURE: 97.7 F | BODY MASS INDEX: 23.49 KG/M2 | RESPIRATION RATE: 20 BRPM | HEART RATE: 96 BPM | OXYGEN SATURATION: 99 % | WEIGHT: 141 LBS | DIASTOLIC BLOOD PRESSURE: 68 MMHG | HEIGHT: 65 IN | SYSTOLIC BLOOD PRESSURE: 105 MMHG

## 2023-07-10 DIAGNOSIS — R41.3 MEMORY LOSS: ICD-10-CM

## 2023-07-10 DIAGNOSIS — E55.9 VITAMIN D DEFICIENCY: ICD-10-CM

## 2023-07-10 DIAGNOSIS — M35.00 SICCA SYNDROME (H): ICD-10-CM

## 2023-07-10 DIAGNOSIS — Z00.00 ENCOUNTER FOR MEDICARE ANNUAL WELLNESS EXAM: Primary | ICD-10-CM

## 2023-07-10 LAB
ANION GAP SERPL CALCULATED.3IONS-SCNC: 9 MMOL/L (ref 7–15)
BUN SERPL-MCNC: 16.4 MG/DL (ref 8–23)
CALCIUM SERPL-MCNC: 9 MG/DL (ref 8.8–10.2)
CHLORIDE SERPL-SCNC: 105 MMOL/L (ref 98–107)
CREAT SERPL-MCNC: 0.54 MG/DL (ref 0.51–0.95)
DEPRECATED CALCIDIOL+CALCIFEROL SERPL-MC: 50 UG/L (ref 20–75)
DEPRECATED HCO3 PLAS-SCNC: 25 MMOL/L (ref 22–29)
ERYTHROCYTE [DISTWIDTH] IN BLOOD BY AUTOMATED COUNT: 12.8 % (ref 10–15)
GFR SERPL CREATININE-BSD FRML MDRD: >90 ML/MIN/1.73M2
GLUCOSE SERPL-MCNC: 96 MG/DL (ref 70–99)
HCT VFR BLD AUTO: 38.5 % (ref 35–47)
HGB BLD-MCNC: 12.6 G/DL (ref 11.7–15.7)
MCH RBC QN AUTO: 32.7 PG (ref 26.5–33)
MCHC RBC AUTO-ENTMCNC: 32.7 G/DL (ref 31.5–36.5)
MCV RBC AUTO: 100 FL (ref 78–100)
PLATELET # BLD AUTO: 269 10E3/UL (ref 150–450)
POTASSIUM SERPL-SCNC: 4.3 MMOL/L (ref 3.4–5.3)
RBC # BLD AUTO: 3.85 10E6/UL (ref 3.8–5.2)
SODIUM SERPL-SCNC: 139 MMOL/L (ref 136–145)
VIT B12 SERPL-MCNC: 455 PG/ML (ref 232–1245)
WBC # BLD AUTO: 5.5 10E3/UL (ref 4–11)

## 2023-07-10 PROCEDURE — 85027 COMPLETE CBC AUTOMATED: CPT | Performed by: FAMILY MEDICINE

## 2023-07-10 PROCEDURE — G0438 PPPS, INITIAL VISIT: HCPCS | Performed by: FAMILY MEDICINE

## 2023-07-10 PROCEDURE — 82607 VITAMIN B-12: CPT | Performed by: FAMILY MEDICINE

## 2023-07-10 PROCEDURE — 82306 VITAMIN D 25 HYDROXY: CPT | Performed by: FAMILY MEDICINE

## 2023-07-10 PROCEDURE — 36415 COLL VENOUS BLD VENIPUNCTURE: CPT | Performed by: FAMILY MEDICINE

## 2023-07-10 PROCEDURE — 80048 BASIC METABOLIC PNL TOTAL CA: CPT | Performed by: FAMILY MEDICINE

## 2023-07-10 ASSESSMENT — ENCOUNTER SYMPTOMS
PARESTHESIAS: 0
CONSTIPATION: 0
FEVER: 0
NAUSEA: 0
DYSURIA: 0
MYALGIAS: 0
EYE PAIN: 0
COUGH: 0
ABDOMINAL PAIN: 0
JOINT SWELLING: 0
PALPITATIONS: 0
DIARRHEA: 0
SORE THROAT: 0
HEARTBURN: 0
CHILLS: 0
HEMATOCHEZIA: 0
WEAKNESS: 0
DIZZINESS: 1
HEMATURIA: 0
NERVOUS/ANXIOUS: 1
HEADACHES: 0
ARTHRALGIAS: 0
FREQUENCY: 0
SHORTNESS OF BREATH: 0

## 2023-07-10 ASSESSMENT — ACTIVITIES OF DAILY LIVING (ADL): CURRENT_FUNCTION: NO ASSISTANCE NEEDED

## 2023-07-10 NOTE — PROGRESS NOTES
"SUBJECTIVE:   Cliff is a 72 year old who presents for Preventive Visit.      7/10/2023     8:48 AM   Additional Questions   Roomed by Charity HERNANDEZ.     Are you in the first 12 months of your Medicare coverage?  No    Has neurology appointment July 21   Patient is aware of short term memory issues -   Has her strategies -   Directional issues - plays oboxo card game -   Uses [employer] - still driving     Went to [place] - went to [place] - for family weddings  Planning travel to [place]     Has  - at home - Tu/Th - 1 hour workout for muscle strength    Vertigo / balance -   Lumbar pain -     Takes 2 Tylenol 500 mg   Likes to walk -     Has pinched nerve in left neck - got steroid injection - recently -   Still has a little issue in left arm - pain/strength               Healthy Habits:     In general, how would you rate your overall health?  Good    Frequency of exercise:  6-7 days/week    Duration of exercise:  30-45 minutes    Do you usually eat at least 4 servings of fruit and vegetables a day, include whole grains    & fiber and avoid regularly eating high fat or \"junk\" foods?  Yes    Taking medications regularly:  Not Applicable    Ability to successfully perform activities of daily living:  No assistance needed    Home Safety:  No safety concerns identified    Hearing Impairment:  Difficulty following a conversation in a noisy restaurant or crowded room    In the past 6 months, have you been bothered by leaking of urine? Yes    In general, how would you rate your overall mental or emotional health?  Good    Additional concerns today:  Yes        Have you ever done Advance Care Planning? (For example, a Health Directive, POLST, or a discussion with a medical provider or your loved ones about your wishes): Yes, patient states has an Advance Care Planning document and will bring a copy to the clinic.       Fall risk  Fallen 2 or more times in the past year?: No  Any fall with injury in the " past year?: No    Cognitive Screening   1) Repeat 3 items (Banana, Sunrise, Chair)    2) Clock draw: NORMAL  3) 3 item recall: Recalls 2 objects   Results: NORMAL clock, 1-2 items recalled: COGNITIVE IMPAIRMENT LESS LIKELY    Mini-CogTM Copyright ALEN Breaux. Licensed by the author for use in [facility]; reprinted with permission (lorna@[email or web address]). All rights reserved.          Reviewed and updated as needed this visit by clinical staff   Tobacco  Allergies  Meds  Problems  Med Hx  Surg Hx  Fam Hx          Reviewed and updated as needed this visit by Provider   Tobacco  Allergies  Meds  Problems  Med Hx  Surg Hx  Fam Hx         Social History     Tobacco Use     Smoking status: Former     Types: Cigarettes     Quit date: 1980     Years since quittin.5     Smokeless tobacco: Never   Substance Use Topics     Alcohol use: Yes     Comment: 2-3 GLASSES OF WINE 3 TIMES/WEEK             7/10/2023     8:42 AM   Alcohol Use   Prescreen: >3 drinks/day or >7 drinks/week? No     Do you have a current opioid prescription? No  Do you use any other controlled substances or medications that are not prescribed by a provider? Alcohol      Current providers sharing in care for this patient include:   Patient Care Team:  Hyun Mcgovern MD as PCP - General (Family Practice)  Lui Gonzalez MD as MD (Orthopedics)  Hyun Mcgovern MD as Assigned PCP  Poly Winston MD as MD (Neurology)    The following health maintenance items are reviewed in Epic and correct as of today:  Health Maintenance   Topic Date Due     MEDICARE ANNUAL WELLNESS VISIT  2023     INFLUENZA VACCINE (1) 2023     DTAP/TDAP/TD IMMUNIZATION (3 - Td or Tdap) 2023     ANNUAL REVIEW OF HM ORDERS  03/15/2024     FALL RISK ASSESSMENT  07/10/2024     COLORECTAL CANCER SCREENING  2025     MAMMO SCREENING  2025     LIPID  2027     ADVANCE CARE PLANNING  07/10/2028     BAHMAN   01/15/2035     HEPATITIS C SCREENING  Completed     PHQ-2 (once per calendar year)  Completed     Pneumococcal Vaccine: 65+ Years  Completed     ZOSTER IMMUNIZATION  Completed     COVID-19 Vaccine  Completed     IPV IMMUNIZATION  Aged Out     MENINGITIS IMMUNIZATION  Aged Out     BP Readings from Last 3 Encounters:   07/10/23 105/68   04/07/23 96/63   03/15/23 118/72    Wt Readings from Last 3 Encounters:   07/10/23 64 kg (141 lb)   04/07/23 64.9 kg (143 lb)   04/12/22 69.9 kg (154 lb)                  Patient Active Problem List   Diagnosis     Presence of artificial shoulder joint     Hip pain, left     Bilateral shoulder region arthritis     Cervicalgia     Edema     Excessive thirst     Female stress incontinence     Hyperglycemia     Hypokalemia     Inflammation of joint of shoulder region     Low back pain     Osteoarthritis of both shoulders     Osteoporosis     Pain in joint, shoulder region     Primary osteoarthritis of left hip     Prolapse of vaginal wall     Greater trochanteric bursitis of left hip     Sjogren's syndrome (H)     Dermatitis     Symptomatic menopausal or female climacteric states     Urticaria     Vertigo     Vitamin D deficiency     Peripheral nerve disease     Sicca syndrome (H)     Sciatica     Memory loss     Past Surgical History:   Procedure Laterality Date     APPENDECTOMY  2008     ARTHROPLASTY SHOULDER Left 2/1/2017    Procedure: ARTHROPLASTY SHOULDER;  Surgeon: Lui Gonzalez MD;  Location: [facility] OR     CHOLECYSTECTOMY  2008     GYN SURGERY  2012    VAG HYST, OOPHORECTOMY     GYN SURGERY  2008    ANTERIOR COLPORRHAPHY, REPAIR CYSTOCELE     HC ANTER COLPORRHAPHY,BLAD/VAGINA      Description: Anterior Colporrhaphy, Repair Of Cystocele;  Recorded: 02/25/2008;     HC REMOVAL GALLBLADDER      Description: Cholecystectomy;  Recorded: 10/28/2008;     HYSTERECTOMY  2004     OOPHORECTOMY Bilateral 2004     AK VAGINAL HYSTERECTOMY,UTERUS 250 GMS/<      Description: Vaginal  "Hysterectomy;  Recorded: 2012;  Comments: for hypermenorrhea/ prolapse; Dr. Octavio DAVIS APPENDECTOMY      Description: Appendectomy;  Recorded: 10/28/2008;       Social History     Tobacco Use     Smoking status: Former     Types: Cigarettes     Quit date: 1980     Years since quittin.5     Smokeless tobacco: Never   Substance Use Topics     Alcohol use: Yes     Comment: 2-3 GLASSES OF WINE 3 TIMES/WEEK     Family History   Problem Relation Age of Onset     Breast Cancer Mother 57.00     Breast Cancer Maternal Grandmother         Unsure of age         Current Outpatient Medications   Medication Sig Dispense Refill     cetirizine HCl 10 MG CAPS Take 1 capsule by mouth daily       Allergies   Allergen Reactions     Cephalexin Itching     Other Environmental Allergy      PN: LW CM1: >>> NO CONTRAST ADVERSE REACTION <<<     Reaction :  PN: LW CM1: >>> NO CONTRAST ADVERSE REACTION <<<     Reaction :     Reviewed HM         Review of Systems   Constitutional: Negative for chills and fever.   HENT: Negative for congestion, ear pain, hearing loss and sore throat.    Eyes: Negative for pain and visual disturbance.   Respiratory: Negative for cough and shortness of breath.    Cardiovascular: Negative for chest pain, palpitations and peripheral edema.   Gastrointestinal: Negative for abdominal pain, constipation, diarrhea, heartburn, hematochezia and nausea.   Genitourinary: Positive for urgency. Negative for dysuria, frequency, genital sores and hematuria.   Musculoskeletal: Negative for arthralgias, joint swelling and myalgias.   Skin: Negative for rash.   Neurological: Positive for dizziness. Negative for weakness, headaches and paresthesias.   Psychiatric/Behavioral: Negative for mood changes. The patient is nervous/anxious.          OBJECTIVE:   /68 (BP Location: Right arm, Patient Position: Sitting, Cuff Size: Adult Small)   Pulse 96   Temp 97.7  F (36.5  C)   Resp 20   Ht 1.655 m (5' 5.16\")  " " Wt 64 kg (141 lb)   LMP  (LMP Unknown)   SpO2 99%   BMI 23.35 kg/m   Estimated body mass index is 23.35 kg/m  as calculated from the following:    Height as of this encounter: 1.655 m (5' 5.16\").    Weight as of this encounter: 64 kg (141 lb).  Physical Exam  Vitals reviewed.   Constitutional:       General: She is not in acute distress.     Appearance: Normal appearance.   HENT:      Head: Normocephalic.      Right Ear: Tympanic membrane, ear canal and external ear normal.      Left Ear: Tympanic membrane, ear canal and external ear normal.      Nose: Nose normal.      Mouth/Throat:      Mouth: Mucous membranes are moist.      Pharynx: No posterior oropharyngeal erythema.   Eyes:      Extraocular Movements: Extraocular movements intact.      Conjunctiva/sclera: Conjunctivae normal.      Pupils: Pupils are equal, round, and reactive to light.   Cardiovascular:      Rate and Rhythm: Normal rate and regular rhythm.      Pulses: Normal pulses.      Heart sounds: Normal heart sounds. No murmur heard.  Pulmonary:      Effort: Pulmonary effort is normal.      Breath sounds: Normal breath sounds.   Abdominal:      Palpations: Abdomen is soft. There is no mass.      Tenderness: There is no abdominal tenderness. There is no guarding or rebound.   Musculoskeletal:         General: No deformity. Normal range of motion.      Cervical back: Normal range of motion and neck supple.   Lymphadenopathy:      Cervical: No cervical adenopathy.   Skin:     General: Skin is warm and dry.   Neurological:      General: No focal deficit present.      Mental Status: She is alert and oriented to person, place, and time.      Gait: Gait normal.   Psychiatric:         Mood and Affect: Mood normal.         Behavior: Behavior normal.      Comments: Somewhat flat affect           Diagnostic Test Results:  Labs reviewed in Epic  Results for orders placed or performed in visit on 07/10/23   Vitamin D Deficiency     Status: Normal   Result Value " Ref Range    Vitamin D, Total (25-Hydroxy) 50 20 - 75 ug/L    Narrative    Season, race, dietary intake, and treatment affect the concentration of 25-hydroxy-Vitamin D. Values may decrease during winter months and increase during summer months. Values 20-29 ug/L may indicate Vitamin D insufficiency and values <20 ug/L may indicate Vitamin D deficiency.    Vitamin D determination is routinely performed by an immunoassay specific for 25 hydroxyvitamin D3.  If an individual is on vitamin D2(ergocalciferol) supplementation, please specify 25 OH vitamin D2 and D3 level determination by LCMSMS test VITD23.     Basic metabolic panel  (Ca, Cl, CO2, Creat, Gluc, K, Na, BUN)     Status: Normal   Result Value Ref Range    Sodium 139 136 - 145 mmol/L    Potassium 4.3 3.4 - 5.3 mmol/L    Chloride 105 98 - 107 mmol/L    Carbon Dioxide (CO2) 25 22 - 29 mmol/L    Anion Gap 9 7 - 15 mmol/L    Urea Nitrogen 16.4 8.0 - 23.0 mg/dL    Creatinine 0.54 0.51 - 0.95 mg/dL    Calcium 9.0 8.8 - 10.2 mg/dL    Glucose 96 70 - 99 mg/dL    GFR Estimate >90 >60 mL/min/1.73m2   Vitamin B12     Status: Normal   Result Value Ref Range    Vitamin B12 455 232 - 1,245 pg/mL   CBC with platelets     Status: Normal   Result Value Ref Range    WBC Count 5.5 4.0 - 11.0 10e3/uL    RBC Count 3.85 3.80 - 5.20 10e6/uL    Hemoglobin 12.6 11.7 - 15.7 g/dL    Hematocrit 38.5 35.0 - 47.0 %     78 - 100 fL    MCH 32.7 26.5 - 33.0 pg    MCHC 32.7 31.5 - 36.5 g/dL    RDW 12.8 10.0 - 15.0 %    Platelet Count 269 150 - 450 10e3/uL       ASSESSMENT / PLAN:   1. Encounter for Medicare annual wellness exam  This is a 71 yo female here for AWV   - PRIMARY CARE FOLLOW-UP SCHEDULING; Future    2. Sicca syndrome (H)  H/o sicca syndrome - no new symptoms     3. Vitamin D deficiency  Vitamin D deficiency - takes supplements - check Vitamin D   - Vitamin D Deficiency; Future  - Vitamin D Deficiency    4. Memory loss  Waiting on workup for memory loss - identified by family  months/years ago.  Recognized by patient as well - has multiple strategies to help recall things.    - Basic metabolic panel  (Ca, Cl, CO2, Creat, Gluc, K, Na, BUN); Future  - Vitamin B12; Future  - CBC with platelets; Future  - Basic metabolic panel  (Ca, Cl, CO2, Creat, Gluc, K, Na, BUN)  - Vitamin B12  - CBC with platelets      Patient has been advised of split billing requirements and indicates understanding: Yes      COUNSELING:  Reviewed preventive health counseling, as reflected in patient instructions       Regular exercise       Healthy diet/nutrition        She reports that she quit smoking about 43 years ago. Her smoking use included cigarettes and cigarettes. She has never used smokeless tobacco.      Appropriate preventive services were discussed with this patient, including applicable screening as appropriate for cardiovascular disease, diabetes, osteopenia/osteoporosis, and glaucoma.  As appropriate for age/gender, discussed screening for colorectal cancer, prostate cancer, breast cancer, and cervical cancer. Checklist reviewing preventive services available has been given to the patient.    Reviewed patients plan of care and provided an AVS. The Basic Care Plan (routine screening as documented in Health Maintenance) for Cliff meets the Care Plan requirement. This Care Plan has been established and reviewed with the Patient.      MARIUM KRUEGER MD  [facility]    Identified Health Risks:    I have reviewed Opioid Use Disorder and Substance Use Disorder risk factors and made any needed referrals.       The patient was provided with written information regarding signs of hearing loss.  Information on urinary incontinence and treatment options given to patient.

## 2023-07-10 NOTE — PATIENT INSTRUCTIONS
Patient Education   Personalized Prevention Plan  You are due for the preventive services outlined below.  Your care team is available to assist you in scheduling these services.  If you have already completed any of these items, please share that information with your care team to update in your medical record.  Health Maintenance Due   Topic Date Due     Annual Wellness Visit  04/12/2023       Signs of Hearing Loss  Hearing loss is a problem shared by many people. In fact, it's one of the most common health problems, particularly as people age. Most people aged 65 and older have some hearing loss. By age 80, almost everyone does. Hearing loss often occurs slowly over the years. So, you may not realize your hearing has gotten worse.   When sudden hearing loss occurs, it's important to contact your healthcare provider right away. Your provider will do a medical exam and a hearing exam as soon as possible. This is to help find the cause and type of your sudden hearing loss. Based on your diagnosis, your healthcare provider will discuss possible treatments.      Hearing much better with one ear can be a sign of hearing loss.     Have your hearing checked  Call your healthcare provider if you:     Have to strain to hear normal conversation    Have to watch other people s faces very carefully to follow what they re saying    Need to ask people to repeat what they ve said    Often misunderstand what people are saying    Turn the volume of the television or radio up so high that others complain    Feel that people are mumbling when they re talking to you    Find that the effort to hear leaves you feeling tired and irritated    Notice, when using the phone, that you hear better with one ear than the other  [employer] last reviewed this educational content on 6/1/2022 2000-2023 [employer]. All rights reserved. This information is not intended as a substitute for professional medical care. Always follow  your healthcare professional's instructions.          Urinary Incontinence, Female (Adult)   Urinary incontinence means loss of bladder control. This problem affects many women, especially as they get older. If you have incontinence, you may be embarrassed to ask for help. But know that this problem can be treated.   Types of Incontinence  There are different types of incontinence. Two of the main types are described here. You can have more than one type.     Stress incontinence. With this type, urine leaks when pressure (stress) is put on the bladder. This may happen when you cough, sneeze, or laugh. Stress incontinence most often occurs because the pelvic floor muscles that support the bladder and urethra are weak. This can happen after pregnancy and vaginal childbirth or a hysterectomy. It can also be due to excess body weight or hormone changes.    Urge incontinence (also called overactive bladder). With this type, a sudden urge to urinate is felt often. This may happen even though there may not be much urine in the bladder. The need to urinate often during the night is common. Urge incontinence most often occurs because of bladder spasms. This may be due to bladder irritation or infection. Damage to bladder nerves or pelvic muscles, constipation, and certain medicines can also lead to urge incontinence.  Treatment depends on the cause. Further evaluation is needed to find the type you have. This will likely include an exam and certain tests. Based on the results, you and your healthcare provider can then plan treatment. Until a diagnosis is made, the home care tips below can help ease symptoms.   Home care    Do pelvic floor muscle exercises, if they are prescribed. The pelvic floor muscles help support the bladder and urethra. Many women find that their symptoms improve when doing special exercises that strengthen these muscles. To do the exercises, contract the muscles you would use to stop your stream of  urine. But do this when you re not urinating. Hold for 10 seconds, then relax. Repeat 10 to 20 times in a row, at least 3 times a day. Your healthcare provider may give you other instructions for how to do the exercises and how often.    Keep a bladder diary. This helps track how often and how much you urinate over a set period of time. Bring this diary with you to your next visit with the provider. The information can help your provider learn more about your bladder problem.    Lose weight, if advised to by your provider. Extra weight puts pressure on the bladder. Your provider can help you create a weight-loss plan that s right for you. This may include exercising more and making certain diet changes.    Don't have foods and drinks that may irritate the bladder. These can include alcohol and caffeinated drinks.    Quit smoking. Smoking and other tobacco use can lead to a long-term (chronic) cough that strains the pelvic floor muscles. Smoking may also damage the bladder and urethra. Talk with your provider about treatments or methods you can use to quit smoking.    If drinking large amounts of fluid makes you have symptoms, you may be advised to limit your fluid intake. You may also be advised to drink most of your fluids during the day and to limit fluids at night.    If you re worried about urine leakage or accidents, you may wear absorbent pads to catch urine. Change the pads often. This helps reduce discomfort. It may also reduce the risk of skin or bladder infections.    Follow-up care  Follow up with your healthcare provider, or as directed. It may take some to find the right treatment for your problem. But healthy lifestyle changes can be made right away. These include such things as exercising on a regular basis, eating a healthy diet, losing weight (if needed), and quitting smoking. Your treatment plan may include special therapies or medicines. Certain procedures or surgery may also be options. Talk  about any questions you have with your provider.   When to seek medical advice  Call the healthcare provider right away if any of these occur:    Fever of 100.4 F (38 C) or higher, or as directed by your provider    Bladder pain or fullness    Belly swelling    Nausea or vomiting    Back pain    Weakness, dizziness, or fainting  Maria Dolores last reviewed this educational content on 1/1/2020 2000-2022 [employer]. All rights reserved. This information is not intended as a substitute for professional medical care. Always follow your healthcare professional's instructions.

## 2023-07-13 DIAGNOSIS — Z78.0 ASYMPTOMATIC MENOPAUSE: Primary | ICD-10-CM

## 2023-07-16 PROBLEM — R41.3 MEMORY LOSS: Status: ACTIVE | Noted: 2023-07-16

## 2023-07-21 ENCOUNTER — OFFICE VISIT (OUTPATIENT)
Dept: NEUROLOGY | Facility: CLINIC | Age: 73
End: 2023-07-21
Attending: FAMILY MEDICINE
Payer: COMMERCIAL

## 2023-07-21 VITALS
TEMPERATURE: 97.2 F | DIASTOLIC BLOOD PRESSURE: 78 MMHG | SYSTOLIC BLOOD PRESSURE: 120 MMHG | HEIGHT: 65 IN | BODY MASS INDEX: 23.29 KG/M2 | WEIGHT: 139.8 LBS | HEART RATE: 80 BPM

## 2023-07-21 DIAGNOSIS — R41.3 MEMORY LOSS: ICD-10-CM

## 2023-07-21 NOTE — LETTER
7/21/2023       RE: Cliff Dai  [address]  [place] [address]       Dear Colleague,    Thank you for referring your patient, Cliff Dai, to the  [facility] at [facility]. Please see a copy of my visit note below.    Chief Complaint: memory problems    History of Present Illness:  Ms. Dai is a 72 year old right-handed female with history of Sjogren's syndrome, presenting for evaluation of memory problems. She is accompanied to today's visit by  Tony Merrill, who assists in providing the history.    Ms. aDi reports that she has a little bit of memory problem for several months.  She has difficulty for immediate recall.  She has strategies.  She has a clock on her phone to help her to keep track of time.  She has mainly short-term memory.  She does not repeat questions.  She does not misplace objects.  Sometimes it takes her a minute to remember the word.  She has a calendar, so she remembers her appointments.        IADLs:  Shopping:  She brings a list with her to the grocery store.  She can pick out items and pay with a credit card.  Driving:  She is driving.  She has no accidents recently.  She does not get lost.  Finances:  She pays her bills.  She is paying them on time.  Meal preparation:  She cooks for her dog.  That's going well.  It requires a lot of planning.  [place] she [place] plan [place] to prepare them.  Medications:  She takes one pill a day, and she remembers to take it.   Medical appointments:  She manages them.  She remembers them.      Her  informant was interviewed and reports that she forgets sometimes.  She is sometimes inappropriate, lacking a filter.  It hasn't happened for a while.  She is not physically aggressive.      She works with a  who comes to her house.  She is exercising daily more than 30 minutes.      Mood:  Good    Sleep:  She gets up in the  middle of the night to use the bathroom.  She wakes up at 6am to feed the dog.  She goes to bed at 11am.       Patient Active Problem List   Diagnosis    Presence of artificial shoulder joint    Hip pain, left    Bilateral shoulder region arthritis    Cervicalgia    Edema    Excessive thirst    Female stress incontinence    Hyperglycemia    Hypokalemia    Inflammation of joint of shoulder region    Low back pain    Osteoarthritis of both shoulders    Osteoporosis    Pain in joint, shoulder region    Primary osteoarthritis of left hip    Prolapse of vaginal wall    Greater trochanteric bursitis of left hip    Sjogren's syndrome (H)    Dermatitis    Symptomatic menopausal or female climacteric states    Urticaria    Vertigo    Vitamin D deficiency    Peripheral nerve disease    Sicca syndrome (H)    Sciatica    Memory loss       Past Medical History:   Diagnosis Date    Arthritis     OSTEOARTHRITIS BOTH SHOULDERS    Osteoporosis     Peripheral neuropathy     Sjogren's syndrome (H)     Stress incontinence        Past Surgical History:   Procedure Laterality Date    APPENDECTOMY  2008    ARTHROPLASTY SHOULDER Left 2/1/2017    Procedure: ARTHROPLASTY SHOULDER;  Surgeon: Lui Gonzalez MD;  Location: [facility]    CHOLECYSTECTOMY  2008    GYN SURGERY  2012    VAG HYST, OOPHORECTOMY    GYN SURGERY  2008    ANTERIOR COLPORRHAPHY, REPAIR CYSTOCELE    HC ANTER COLPORRHAPHY,BLAD/VAGINA      Description: Anterior Colporrhaphy, Repair Of Cystocele;  Recorded: 02/25/2008;    HC REMOVAL GALLBLADDER      Description: Cholecystectomy;  Recorded: 10/28/2008;    HYSTERECTOMY  2004    OOPHORECTOMY Bilateral 2004    CT VAGINAL HYSTERECTOMY,UTERUS 250 GMS/<      Description: Vaginal Hysterectomy;  Recorded: 02/25/2012;  Comments: for hypermenorrhea/ prolapse; Dr. Octavio LYNN APPENDECTOMY      Description: Appendectomy;  Recorded: 10/28/2008;       Current Outpatient Medications   Medication Sig Dispense Refill    cetirizine HCl  "10 MG CAPS Take 1 capsule by mouth daily          Allergies   Allergen Reactions    Cephalexin Itching    Other Environmental Allergy      PN: LW CM1: >>> NO CONTRAST ADVERSE REACTION <<<     Reaction :  PN: LW CM1: >>> NO CONTRAST ADVERSE REACTION <<<     Reaction :       Family History   Problem Relation Age of Onset    Breast Cancer Mother 57.00    Breast Cancer Maternal Grandmother         Unsure of age         Social History     Socioeconomic History    Marital status:      Spouse name: Not on file    Number of children: Not on file    Years of education: Not on file    Highest education level: Not on file   Occupational History    Not on file   Tobacco Use    Smoking status: Former     Types: Cigarettes     Quit date: 1980     Years since quittin.5    Smokeless tobacco: Never   Vaping Use    Vaping Use: Never used   Substance and Sexual Activity    Alcohol use: Yes     Comment: 2-3 GLASSES OF WINE 3 TIMES/WEEK    Drug use: Never    Sexual activity: Yes   Other Topics Concern    Not on file   Social History Narrative    Not on file     Social Determinants of Health     Financial Resource Strain: Not on file   Food Insecurity: Not on file   Transportation Needs: Not on file   Physical Activity: Not on file   Stress: Not on file   Social Connections: Not on file   Intimate Partner Violence: Not on file   Housing Stability: Not on file     SHX:  She had a PhD in Counseling Psychology at the [employer].  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  She drinks 2 glasses wine with meals sometimes.      FHX:  No PD.  Her mother  at age 93.  She had some dementia.      General Physical examination:  /78   Pulse 80   Temp 97.2  F (36.2  C) (Temporal)   Ht 1.651 m (5' 5\")   Wt 63.4 kg (139 lb 12.8 oz)   LMP  (LMP Unknown)   BMI 23.26 kg/m       General: Ms. Dai is well appearing and is appropriately groomed and dressed.    Neurological examination:    Mental status: Ms. Dai  is " awake, alert, and appropriate, with fluent speech, no word finding difficulties and no difficulty in answering questions.   Cranial nerves:  Visual fields are full to confrontation with no visual extinction. Extraocular movements are intact. Smooth pursuit is intact. Saccades are normal in initiation, velocity and amplitude both vertically and horizontally.  Facial strength is full bilaterally.  Sternocledomastoid and trapezius muscle strength is full bilaterally.  Motor examination: Bulk is normal throughout. Axial and upper and lower extremity tone is normal. No pronator drift is noted. Strength is 5/5 and symmetric throughout. There is no tremor or other involuntary movement.  Coordination: Finger-nose-finger and heel-knee-shin testing is normal with no dysmetria bilaterally.  Rapid finger tapping, opening and closing of fist and pronation-supination are not slowed.   Gait: She has an upright and steady stance with normal stride length and arm swing. Tandem walking is intact. No Romberg's sign is present.    SLUMS:  Day: Friday (1/1)  Year: 2023 (1/1)  State: [place] (1/1)  Calculation: 3/3  Naming animals: 16 (3/3)  Recall: 5/5  Digit span: 2/2  Clock: 4/4  Shape: 2/2  Story recall: 6/8  Total: 28/30    Neuropsychological evaluation:  n/a    Labs:  Lab Results   Component Value Date    WBC 5.5 07/10/2023    RBC 3.85 07/10/2023    HGB 12.6 07/10/2023    HCT 38.5 07/10/2023     07/10/2023    MCH 32.7 07/10/2023    MCHC 32.7 07/10/2023    RDW 12.8 07/10/2023     07/10/2023     07/10/2023    POTASSIUM 4.3 07/10/2023    CHLORIDE 105 07/10/2023    CO2 25 07/10/2023    ANIONGAP 9 07/10/2023    GLC 96 07/10/2023    BUN 16.4 07/10/2023    CR 0.54 07/10/2023    TORIE 9.0 07/10/2023    TSH 2.31 03/15/2023    T4 1.35 03/15/2023    B12 455 07/10/2023        Imaging:  MRI brain 3/2/2021  FINDINGS:  INTRACRANIAL CONTENTS: No acute or subacute infarct. No mass, acute hemorrhage, or extra-axial fluid collections.  Normal brain parenchymal signal. Mild generalized cerebral atrophy. No hydrocephalus. Mild cerebellar atrophy.      SELLA: No abnormality accounting for technique.     OSSEOUS STRUCTURES/SOFT TISSUES: Normal marrow signal. The major intracranial vascular flow voids are maintained.      ORBITS: No abnormality accounting for technique.      SINUSES/MASTOIDS: No paranasal sinus mucosal disease. No middle ear or mastoid effusion.      IMPRESSION:  1.  No acute infarct, intracranial hemorrhage, or intracranial mass.  2.  Generalized brain atrophy and presumed microvascular ischemic changes as detailed above.       Impression:  Ms. Dai is a 72 year old right-handed female with history of Sjogren's syndrome, who presents with memory problems.  Her performance on the SLUMS today is within normal range.  We discussed to further work up with neuropsychological testing and brain MRI.  Both the patient and her  agreed with the plan.       Recommendations:  1.  Neuropsychological testing.  2.  MRI brain    Follow-up: Return in about 2 months.    I spent 90 minutes total today for this visit, which includes face-to-face with the patient, reviewing the chart (neuropsychological testing, MRI images, lab reports, clinical notes, medications), ordering diagnostic tests, counseling, and documentation.            Again, thank you for allowing me to participate in the care of your patient.      Sincerely,    Poly Winston MD

## 2023-07-21 NOTE — PROGRESS NOTES
Chief Complaint: memory problems    History of Present Illness:  Ms. Dai is a 72 year old right-handed female with history of Sjogren's syndrome, presenting for evaluation of memory problems. She is accompanied to today's visit by  Tony Merrill, who assists in providing the history.    Ms. Dai reports that she has a little bit of memory problem for several months.  She has difficulty for immediate recall.  She has strategies.  She has a clock on her phone to help her to keep track of time.  She has mainly short-term memory.  She does not repeat questions.  She does not misplace objects.  Sometimes it takes her a minute to remember the word.  She has a calendar, so she remembers her appointments.        IADLs:  Shopping:  She brings a list with her to the grocery store.  She can pick out items and pay with a credit card.  Driving:  She is driving.  She has no accidents recently.  She does not get lost.  Finances:  She pays her bills.  She is paying them on time.  Meal preparation:  She cooks for her dog.  That's going well.  It requires a lot of planning.  [place] she had plan [place] to prepare them.  Medications:  She takes one pill a day, and she remembers to take it.   Medical appointments:  She manages them.  She remembers them.      Her  informant was interviewed and reports that she forgets sometimes.  She is sometimes inappropriate, lacking a filter.  It hasn't happened for a while.  She is not physically aggressive.      She works with a  who comes to her house.  She is exercising daily more than 30 minutes.      Mood:  Good    Sleep:  She gets up in the middle of the night to use the bathroom.  She wakes up at 6am to feed the dog.  She goes to bed at 11am.       Patient Active Problem List   Diagnosis     Presence of artificial shoulder joint     Hip pain, left     Bilateral shoulder region arthritis     Cervicalgia     Edema     Excessive thirst     Female  stress incontinence     Hyperglycemia     Hypokalemia     Inflammation of joint of shoulder region     Low back pain     Osteoarthritis of both shoulders     Osteoporosis     Pain in joint, shoulder region     Primary osteoarthritis of left hip     Prolapse of vaginal wall     Greater trochanteric bursitis of left hip     Sjogren's syndrome (H)     Dermatitis     Symptomatic menopausal or female climacteric states     Urticaria     Vertigo     Vitamin D deficiency     Peripheral nerve disease     Sicca syndrome (H)     Sciatica     Memory loss       Past Medical History:   Diagnosis Date     Arthritis     OSTEOARTHRITIS BOTH SHOULDERS     Osteoporosis      Peripheral neuropathy      Sjogren's syndrome (H)      Stress incontinence        Past Surgical History:   Procedure Laterality Date     APPENDECTOMY  2008     ARTHROPLASTY SHOULDER Left 2/1/2017    Procedure: ARTHROPLASTY SHOULDER;  Surgeon: Lui Gonzalez MD;  [facility]     CHOLECYSTECTOMY  2008     GYN SURGERY  2012    VAG HYST, OOPHORECTOMY     GYN SURGERY  2008    ANTERIOR COLPORRHAPHY, REPAIR CYSTOCELE     HC ANTER COLPORRHAPHY,BLAD/VAGINA      Description: Anterior Colporrhaphy, Repair Of Cystocele;  Recorded: 02/25/2008;     HC REMOVAL GALLBLADDER      Description: Cholecystectomy;  Recorded: 10/28/2008;     HYSTERECTOMY  2004     OOPHORECTOMY Bilateral 2004     ID VAGINAL HYSTERECTOMY,UTERUS 250 GMS/<      Description: Vaginal Hysterectomy;  Recorded: 02/25/2012;  Comments: for hypermenorrhea/ prolapse; Dr. Octavio LYNN APPENDECTOMY      Description: Appendectomy;  Recorded: 10/28/2008;       Current Outpatient Medications   Medication Sig Dispense Refill     cetirizine HCl 10 MG CAPS Take 1 capsule by mouth daily          Allergies   Allergen Reactions     Cephalexin Itching     Other Environmental Allergy      PN: LW CM1: >>> NO CONTRAST ADVERSE REACTION <<<     Reaction :  PN: LW CM1: >>> NO CONTRAST ADVERSE REACTION <<<     Reaction  ":       Family History   Problem Relation Age of Onset     Breast Cancer Mother 57.00     Breast Cancer Maternal Grandmother         Unsure of age         Social History     Socioeconomic History     Marital status:      Spouse name: Not on file     Number of children: Not on file     Years of education: Not on file     Highest education level: Not on file   Occupational History     Not on file   Tobacco Use     Smoking status: Former     Types: Cigarettes     Quit date: 1980     Years since quittin.5     Smokeless tobacco: Never   Vaping Use     Vaping Use: Never used   Substance and Sexual Activity     Alcohol use: Yes     Comment: 2-3 GLASSES OF WINE 3 TIMES/WEEK     Drug use: Never     Sexual activity: Yes   Other Topics Concern     Not on file   Social History Narrative     Not on file     Social Determinants of Health     Financial Resource Strain: Not on file   Food Insecurity: Not on file   Transportation Needs: Not on file   Physical Activity: Not on file   Stress: Not on file   Social Connections: Not on file   Intimate Partner Violence: Not on file   Housing Stability: Not on file     SHX:  She had a PhD in Counseling Psychology at the [employer].  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.  She drinks 2 glasses wine with meals sometimes.      FHX:  No PD.  Her mother  at age 93.  She had some dementia.      General Physical examination:  /78   Pulse 80   Temp 97.2  F (36.2  C) (Temporal)   Ht 1.651 m (5' 5\")   Wt 63.4 kg (139 lb 12.8 oz)   LMP  (LMP Unknown)   BMI 23.26 kg/m       General: Ms. Dai is well appearing and is appropriately groomed and dressed.    Neurological examination:    Mental status: Ms. Dai  is awake, alert, and appropriate, with fluent speech, no word finding difficulties and no difficulty in answering questions.   Cranial nerves:  Visual fields are full to confrontation with no visual extinction. Extraocular movements are intact. Smooth " pursuit is intact. Saccades are normal in initiation, velocity and amplitude both vertically and horizontally.  Facial strength is full bilaterally.  Sternocledomastoid and trapezius muscle strength is full bilaterally.  Motor examination: Bulk is normal throughout. Axial and upper and lower extremity tone is normal. No pronator drift is noted. Strength is 5/5 and symmetric throughout. There is no tremor or other involuntary movement.  Coordination: Finger-nose-finger and heel-knee-shin testing is normal with no dysmetria bilaterally.  Rapid finger tapping, opening and closing of fist and pronation-supination are not slowed.   Gait: She has an upright and steady stance with normal stride length and arm swing. Tandem walking is intact. No Romberg's sign is present.    SLUMS:  Day: Friday (1/1)  Year: 2023 (1/1)  State: [place] (1/1)  Calculation: 3/3  Naming animals: 16 (3/3)  Recall: 5/5  Digit span: 2/2  Clock: 4/4  Shape: 2/2  Story recall: 6/8  Total: 28/30    Neuropsychological evaluation:  n/a    Labs:  Lab Results   Component Value Date    WBC 5.5 07/10/2023    RBC 3.85 07/10/2023    HGB 12.6 07/10/2023    HCT 38.5 07/10/2023     07/10/2023    MCH 32.7 07/10/2023    MCHC 32.7 07/10/2023    RDW 12.8 07/10/2023     07/10/2023     07/10/2023    POTASSIUM 4.3 07/10/2023    CHLORIDE 105 07/10/2023    CO2 25 07/10/2023    ANIONGAP 9 07/10/2023    GLC 96 07/10/2023    BUN 16.4 07/10/2023    CR 0.54 07/10/2023    TORIE 9.0 07/10/2023    TSH 2.31 03/15/2023    T4 1.35 03/15/2023    B12 455 07/10/2023        Imaging:  MRI brain 3/2/2021  FINDINGS:  INTRACRANIAL CONTENTS: No acute or subacute infarct. No mass, acute hemorrhage, or extra-axial fluid collections. Normal brain parenchymal signal. Mild generalized cerebral atrophy. No hydrocephalus. Mild cerebellar atrophy.      SELLA: No abnormality accounting for technique.     OSSEOUS STRUCTURES/SOFT TISSUES: Normal marrow signal. The major intracranial  vascular flow voids are maintained.      ORBITS: No abnormality accounting for technique.      SINUSES/MASTOIDS: No paranasal sinus mucosal disease. No middle ear or mastoid effusion.      IMPRESSION:  1.  No acute infarct, intracranial hemorrhage, or intracranial mass.  2.  Generalized brain atrophy and presumed microvascular ischemic changes as detailed above.       Impression:  Ms. Dai is a 72 year old right-handed female with history of Sjogren's syndrome, who presents with memory problems.  Her performance on the SLUMS today is within normal range.  We discussed to further work up with neuropsychological testing and brain MRI.  Both the patient and her  agreed with the plan.       Recommendations:  1.  Neuropsychological testing.  2.  MRI brain    Follow-up: Return in about 2 months.    I spent 90 minutes total today for this visit, which includes face-to-face with the patient, reviewing the chart (neuropsychological testing, MRI images, lab reports, clinical notes, medications), ordering diagnostic tests, counseling, and documentation.

## 2023-08-01 ENCOUNTER — HOSPITAL ENCOUNTER (OUTPATIENT)
Dept: MRI IMAGING | Facility: HOSPITAL | Age: 73
Discharge: HOME | End: 2023-08-01
Attending: PSYCHIATRY & NEUROLOGY | Admitting: PSYCHIATRY & NEUROLOGY
Payer: COMMERCIAL

## 2023-08-01 PROCEDURE — 70551 MRI BRAIN STEM W/O DYE: CPT

## 2023-08-08 ENCOUNTER — TELEPHONE (OUTPATIENT)
Dept: FAMILY MEDICINE | Facility: CLINIC | Age: 73
End: 2023-08-08
Payer: COMMERCIAL

## 2023-08-08 ENCOUNTER — MYC MEDICAL ADVICE (OUTPATIENT)
Dept: FAMILY MEDICINE | Facility: CLINIC | Age: 73
End: 2023-08-08
Payer: COMMERCIAL

## 2023-08-08 NOTE — TELEPHONE ENCOUNTER
Dr. Mcgovern,     Spoke to pt. Pt verb she has chronic cold feet. Last night was more intense than usual. Rates 4-5/10 on cold intensity. Baseline is 2/10. Rt leg worst than left. Denies pain or numbness in bilateral leg. Pt Googled symptoms, and read that symptoms is caused by poor circulation (which she said she knew this) and that poor circulation can be caused by cardiac issues. Pt would like to know what is the next step to rule out cardiac issues.       Regina GILLETTE RN  [facility]     .

## 2023-08-11 NOTE — TELEPHONE ENCOUNTER
If she is still worried about this, she should be seen (set up an appointment with me this next week).

## 2023-08-14 ENCOUNTER — TRANSFERRED RECORDS (OUTPATIENT)
Dept: HEALTH INFORMATION MANAGEMENT | Facility: CLINIC | Age: 73
End: 2023-08-14
Payer: COMMERCIAL

## 2023-08-14 NOTE — TELEPHONE ENCOUNTER
Call to pt regarding pt's mychart message re: cold feet and relay provider's recommendation for pt to be seen by provider. Pt prefer in clinic OV. Appt scheduled for 8/21/23.     Regina GILLETTE RN  [facility]

## 2023-08-21 ENCOUNTER — OFFICE VISIT (OUTPATIENT)
Dept: FAMILY MEDICINE | Facility: CLINIC | Age: 73
End: 2023-08-21
Payer: COMMERCIAL

## 2023-08-21 VITALS
BODY MASS INDEX: 23.37 KG/M2 | WEIGHT: 140.25 LBS | HEIGHT: 65 IN | SYSTOLIC BLOOD PRESSURE: 123 MMHG | TEMPERATURE: 97.5 F | OXYGEN SATURATION: 98 % | HEART RATE: 78 BPM | RESPIRATION RATE: 16 BRPM | DIASTOLIC BLOOD PRESSURE: 75 MMHG

## 2023-08-21 DIAGNOSIS — R20.9 COLD FEET: Primary | ICD-10-CM

## 2023-08-21 PROCEDURE — 99213 OFFICE O/P EST LOW 20 MIN: CPT | Performed by: FAMILY MEDICINE

## 2023-08-21 NOTE — PROGRESS NOTES
"  1. Cold feet  This is a 72 yo female with reports of \"cold feet\".  She has experienced this for some time, but more so on the right foot.  Her chiropractor took her temperature on both feet and reports that the right foot is cooler than the left foot.  Pulses are palpable and ABIs done - both reportedly >1.  Reassured patient - there are no blood flow issues to lower extremity.      Subjective   Cliff is a 73 year old, presenting for the following health issues:  Cold Extremity (Both feet feeling cold more so on the right- wearing socks to bed- and wool socks noticing this every night)        8/21/2023     8:28 AM   Additional Questions   Roomed by Charity HOGAN       Both feet - \"always a little problem\"  Increased hydration    Chiropractor has a temp gun -   On August 11 - there was a 10 degree difference in feet  Right 85.5, left 96 degrees  Saw him again, both about 94    Never sees any color changes in the feet -         History of Present Illness       Reason for visit:  Poor circulation to my feet, primarily the right foot.    She eats 2-3 servings of fruits and vegetables daily.She consumes 0 sweetened beverage(s) daily.She exercises with enough effort to increase her heart rate 9 or less minutes per day.  She exercises with enough effort to increase her heart rate 3 or less days per week.   She is taking medications regularly.               Review of Systems   Constitutional:  Negative for activity change, appetite change, chills and fever.   Musculoskeletal:         Cold feet     Skin: Negative.    Psychiatric/Behavioral:  Positive for confusion (mild).    All other systems reviewed and are negative.           Objective    /75 (BP Location: Right arm, Patient Position: Sitting, Cuff Size: Adult Small)   Pulse 78   Temp 97.5  F (36.4  C)   Resp 16   Ht 1.654 m (5' 5.12\")   Wt 63.6 kg (140 lb 4 oz)   LMP  (LMP Unknown)   SpO2 98%   BMI 23.25 kg/m    Body mass index is 23.25 kg/m .  Physical " Exam  Vitals reviewed.   Constitutional:       General: She is not in acute distress.     Appearance: Normal appearance.   HENT:      Head: Normocephalic.      Right Ear: Tympanic membrane, ear canal and external ear normal.      Left Ear: Tympanic membrane, ear canal and external ear normal.      Nose: Nose normal.      Mouth/Throat:      Mouth: Mucous membranes are moist.      Pharynx: No posterior oropharyngeal erythema.   Eyes:      Extraocular Movements: Extraocular movements intact.      Conjunctiva/sclera: Conjunctivae normal.      Pupils: Pupils are equal, round, and reactive to light.   Cardiovascular:      Rate and Rhythm: Normal rate and regular rhythm.      Pulses: Normal pulses.      Heart sounds: Normal heart sounds. No murmur heard.  Pulmonary:      Effort: Pulmonary effort is normal.      Breath sounds: Normal breath sounds.   Abdominal:      Palpations: Abdomen is soft. There is no mass.      Tenderness: There is no abdominal tenderness. There is no guarding or rebound.   Musculoskeletal:         General: No deformity. Normal range of motion.      Cervical back: Normal range of motion and neck supple.   Lymphadenopathy:      Cervical: No cervical adenopathy.   Skin:     General: Skin is warm and dry.   Neurological:      General: No focal deficit present.      Mental Status: She is alert.      Comments: Mild confusion - vague historian     Psychiatric:         Mood and Affect: Mood normal.         Behavior: Behavior normal.            No results found for any visits on 08/21/23.

## 2023-08-21 NOTE — PATIENT INSTRUCTIONS
Your ankle/brachial index was normal - this measures blood flow to your lower extremities (legs/feet).  It compares blood flow to legs (ankle) and arms (brachial).      Anything over 0.96 is considered normal.  Your right side is 140/94, so well over 1.0  Your left side is 110/100, so well over 1.0  This would suggest no sign of obstruction to the blood flow.

## 2023-08-27 ASSESSMENT — ENCOUNTER SYMPTOMS
CONFUSION: 1
CHILLS: 0
APPETITE CHANGE: 0
ACTIVITY CHANGE: 0
FEVER: 0

## 2023-08-28 ENCOUNTER — OFFICE VISIT (OUTPATIENT)
Dept: NEUROLOGY | Facility: CLINIC | Age: 73
End: 2023-08-28
Attending: FAMILY MEDICINE
Payer: COMMERCIAL

## 2023-08-28 DIAGNOSIS — R41.3 COMPLAINTS OF MEMORY DISTURBANCE: Primary | ICD-10-CM
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
## 2023-08-28 NOTE — PROGRESS NOTES
Patient was seen for neuropsychological evaluation at the request of Dr. Hyun Mcgovern, for the purposes of diagnostic clarification and treatment planning.  2 hrs 8 min of test administration and scoring were provided by this writer, Alexandria Price.  Please see Dr. Scottie Gregg's report for a full interpretation of the findings.
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
## 2023-08-30 NOTE — PROGRESS NOTES
Name: Cliff Dai MRN: [id number]  : 1950  GALINDO: 2023  Staff: REBEKAH Tech: AP Age: 73  Sex: Female Hand: Right Educ: 20  Vision: 20/20 ?with correction / []without correction    ORIENTATION     Time  -0     Place       Personal info          Presidents 3 /6    WRAT5   SS %ile Grade Equiv.     Word Reading  136 99 >12.9    WAIS-IV   Raw SSa     Similarities  24 11     Block Design  24 9     Digit Span  25 11 RDS= 11     Coding  47 11    COWAT (CFL)     Raw: 57  SS: 15 %ile: 95-97    ANIMAL NAMING TEST     Raw: 14  SS: 7 T: 36    BOSTON NAMING TEST     Raw: 50  SS: 9 %ile: 29-40    MICHELLE Raw: 29 SS: 15 %ile: 95-97    TRAILS Raw  Err SS %ile     A 29  0 13 82-89      B 93  0 10 41-59     CHIOMA-O    Raw    T %ile     Time to Copy        ?1     Copy    27.0     6-10    AVLT (30-91 Melvin Norms)     Trial 1 2 3 4 5 B 6 30'             7 8 9 10 11 4 10 8      Raw T %ile     Trial 1-5 Total   45 48 32-68     Short Delay Recall (T6)  10 50 32-68     30' Recall   8 46 32-68     30' Recognition Hits/FPs  15/0 63 84-92     Memory Efficiency Score  1.89 50 32-68    WMS-IV  Raw SS / %ile     LM I  27 9     LM II  15 10     LM Recog. 21 >75th    BVMT-R Form 1     Trial 1 2 3      6 8 8  Raw T     Total Learning (1-3) 22 53     Delayed Recall  7 47     Percent Retention 88 >16th     Recognition Hits/FP 6/0 >16th    BDI-II     Raw:  0 Interpretation: Minimal    HORACE     Raw:  1 Interpretation: Minimal
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
## 2023-09-22 ENCOUNTER — HOSPITAL ENCOUNTER (OUTPATIENT)
Dept: BONE DENSITY | Facility: HOSPITAL | Age: 73
Discharge: HOME | End: 2023-09-22
Attending: FAMILY MEDICINE | Admitting: FAMILY MEDICINE
Payer: COMMERCIAL

## 2023-09-22 PROCEDURE — 77080 DXA BONE DENSITY AXIAL: CPT

## 2023-09-29 ENCOUNTER — VIRTUAL VISIT (OUTPATIENT)
Dept: NEUROLOGY | Facility: CLINIC | Age: 73
End: 2023-09-29
Payer: COMMERCIAL

## 2023-09-29 VITALS — HEIGHT: 65 IN | BODY MASS INDEX: 22.66 KG/M2 | WEIGHT: 136 LBS

## 2023-09-29 DIAGNOSIS — R41.3 COMPLAINTS OF MEMORY DISTURBANCE: Primary | ICD-10-CM

## 2023-09-29 ASSESSMENT — PAIN SCALES - GENERAL: PAINLEVEL: NO PAIN (0)

## 2023-09-29 NOTE — NURSING NOTE
Is the patient currently in the state of [place]? YES    Visit mode:TELEPHONE    If the visit is dropped, the patient can be reconnected by: TELEPHONE VISIT: Phone number:   Telephone Information:   Mobile [phone]       Will anyone else be joining the visit? NO  (If patient encounters technical issues they should call [phone])    How would you like to obtain your AVS? MyChart    Are changes needed to the allergy or medication list? No    Reason for visit: LUIS EDUARDO LLAMAS

## 2023-09-29 NOTE — LETTER
9/29/2023       RE: Cliff Dai  [address]  [place] [address]     Dear Colleague,    Thank you for referring your patient, Cliff Dai, to the  [facility] at [facility]. Please see a copy of my visit note below.    Virtual Visit Details    Type of service:  Telephone Visit   Phone call duration: 10 minutes     Chief Complaint: memory problems    History of Present Illness:  Ms. Dai is a 73 year old right-handed female with history of Sjogren's syndrome, here for follow up for evaluation of memory problems. She presented to today's appointment by herself.      She does not think that anything has changed.  She has devices and strategies to assist with her memory.      She walks everyday and does workout with her .     Patient Active Problem List   Diagnosis    Presence of artificial shoulder joint    Hip pain, left    Bilateral shoulder region arthritis    Cervicalgia    Edema    Excessive thirst    Female stress incontinence    Hyperglycemia    Hypokalemia    Inflammation of joint of shoulder region    Low back pain    Osteoarthritis of both shoulders    Osteoporosis    Pain in joint, shoulder region    Primary osteoarthritis of left hip    Prolapse of vaginal wall    Greater trochanteric bursitis of left hip    Sjogren's syndrome (H)    Dermatitis    Symptomatic menopausal or female climacteric states    Urticaria    Vertigo    Vitamin D deficiency    Peripheral nerve disease    Sicca syndrome (H)    Sciatica    Memory loss       Past Medical History:   Diagnosis Date    Arthritis     OSTEOARTHRITIS BOTH SHOULDERS    Osteoporosis     Peripheral neuropathy     Sjogren's syndrome (H)     Stress incontinence        Past Surgical History:   Procedure Laterality Date    APPENDECTOMY  2008    ARTHROPLASTY SHOULDER Left 2/1/2017    Procedure: ARTHROPLASTY SHOULDER;  Surgeon: Lui Gonzalez MD;  Location:  OR     CHOLECYSTECTOMY  2008    GYN SURGERY  2012    VAG HYST, OOPHORECTOMY    GYN SURGERY      ANTERIOR COLPORRHAPHY, REPAIR CYSTOCELE    HC ANTER COLPORRHAPHY,BLAD/VAGINA      Description: Anterior Colporrhaphy, Repair Of Cystocele;  Recorded: 2008;    HC REMOVAL GALLBLADDER      Description: Cholecystectomy;  Recorded: 10/28/2008;    HYSTERECTOMY  2004    OOPHORECTOMY Bilateral 2004    GA VAGINAL HYSTERECTOMY,UTERUS 250 GMS/<      Description: Vaginal Hysterectomy;  Recorded: 2012;  Comments: for hypermenorrhea/ prolapse; Dr. Octavio LYNN APPENDECTOMY      Description: Appendectomy;  Recorded: 10/28/2008;       Current Outpatient Medications   Medication Sig Dispense Refill    cetirizine HCl 10 MG CAPS Take 1 capsule by mouth daily          Allergies   Allergen Reactions    Cephalexin Itching    Other Environmental Allergy      PN: LW CM1: >>> NO CONTRAST ADVERSE REACTION <<<     Reaction :  PN: LW CM1: >>> NO CONTRAST ADVERSE REACTION <<<     Reaction :       Family History   Problem Relation Age of Onset    Breast Cancer Mother 57.00    Breast Cancer Maternal Grandmother         Unsure of age         Social History     Socioeconomic History    Marital status:      Spouse name: Not on file    Number of children: Not on file    Years of education: Not on file    Highest education level: Not on file   Occupational History    Not on file   Tobacco Use    Smoking status: Former     Types: Cigarettes     Quit date: 1980     Years since quittin.7    Smokeless tobacco: Never   Vaping Use    Vaping Use: Never used   Substance and Sexual Activity    Alcohol use: Yes     Comment: 2-3 GLASSES OF WINE 3 TIMES/WEEK    Drug use: Never    Sexual activity: Yes   Other Topics Concern    Not on file   Social History Narrative    Not on file     Social Determinants of Health     Financial Resource Strain: Not on file   Food Insecurity: Not on file   Transportation Needs: Not on file   Physical Activity:  Not on file   Stress: Not on file   Social Connections: Not on file   Interpersonal Safety: Not on file   Housing Stability: Not on file       General Physical examination:    Neurological examination:    Mental status: Ms. Dai  is awake, alert, and appropriate, with fluent speech, no word finding difficulties and no difficulty in answering questions.     Neuropsychological evaluation:  A full report of the neuropsychological battery testing is available separately. Findings are summarized here briefly.  Dr. Gregg 8/28/2023  Results from the present neuropsychological evaluation were mildly abnormal. While Ms. Dai performed within normal limits across most cognitive domains, she displayed generalized suppression in cognitive scores when compared to her estimated above average baseline. Based on psychometric estimation and her educational/vocational history, her expected baseline is solidly above average. A couple performances reach this range, but most are middle average and a couple are below average. Her learning and memory profile was non-amnestic and in the average range. Specific areas of strength included phonemic fluency, reading/pronunciation, and line orientation judgement. Specific areas of weakness included semantic fluency and aspects of executive functioning including planning and organizing. Psychiatrically, she endorsed significant symptoms of anxiety and depression. Regarding etiology, frontal lobe dysfunction, with the right side more impacted than the left, may be responsible for subtle cognitive changes.      While her performances are likely mildly below her baseline abilities, we are not seeing a clear enough abnormality to make a diagnosis of mild cognitive impairment and certainly not dementia. It may be the case that her high premorbid abilities provide a cognitive reserve, weakening the impact of cognitive decline. Today's data are consistent with her presenting issues, with she and  her family noticing differences from her baseline but those difference not yet forcing limitations on daily activities.      In sum, there are signs of early changes in cognition. The cause or syndrome is not yet clear. Right now, the effects seem mostly centered on frontal systems functioning. Broadly speaking for the memory clinic, we will want to carry out a thorough physical workup and monitor her over time.       Labs:  Lab Results   Component Value Date    WBC 5.5 07/10/2023    RBC 3.85 07/10/2023    HGB 12.6 07/10/2023    HCT 38.5 07/10/2023     07/10/2023    MCH 32.7 07/10/2023    MCHC 32.7 07/10/2023    RDW 12.8 07/10/2023     07/10/2023     07/10/2023    POTASSIUM 4.3 07/10/2023    CHLORIDE 105 07/10/2023    CO2 25 07/10/2023    ANIONGAP 9 07/10/2023    GLC 96 07/10/2023    BUN 16.4 07/10/2023    CR 0.54 07/10/2023    TORIE 9.0 07/10/2023    TSH 2.31 03/15/2023    T4 1.35 03/15/2023    B12 455 07/10/2023        Imaging:  MRI brain 8/1/2023  FINDINGS:  INTRACRANIAL CONTENTS: No acute or subacute infarct. No mass, acute hemorrhage, or extra-axial fluid collections. Scattered nonspecific T2/FLAIR hyperintensities within the cerebral white matter most consistent with mild chronic microvascular ischemic   change. Mild to moderate generalized cerebral atrophy. No hydrocephalus. No disproportionate regional volume loss. Normal position of the cerebellar tonsils.      SELLA: No abnormality accounting for technique.     OSSEOUS STRUCTURES/SOFT TISSUES: Normal marrow signal. The major intracranial vascular flow voids are maintained.      ORBITS: No abnormality accounting for technique.      SINUSES/MASTOIDS: Mild to moderate mucosal thickening scattered about the paranasal sinuses. Scattered fluid/membrane thickening in the left mastoid air cells. Right mastoid air cells essentially clear.                                                                       IMPRESSION:  1.  Brain atrophy and  presumed chronic microvascular ischemic changes as detailed above. No dementia specific pattern identified.  2.  No acute intracranial process.    I reviewed the images myself.        Impression:  Ms. Dai is a 73 year old right-handed female with history of Sjogren's syndrome, who presents with memory problems.  Based on the clinical history, neuropsychological testing and brain imaging, Ms. Dai does not meet the criteria for mild or major neurocogntive disorder.  We discussed to follow up in one year or when she thinks that she needs another evaluation due to memory decline.       Recommendations:  Continue exercise 30 minutes/day  Follow up prn.     I spent 30 minutes total today for this visit, which includes face-to-face with the patient, reviewing the chart (neuropsychological testing, MRI images, lab reports, clinical notes, medications), counseling, and documentation.         Again, thank you for allowing me to participate in the care of your patient.      Sincerely,    Poly Winston MD

## 2023-09-29 NOTE — PROGRESS NOTES
Virtual Visit Details    Type of service:  Telephone Visit   Phone call duration: 10 minutes     Chief Complaint: memory problems    History of Present Illness:  Ms. Dai is a 73 year old right-handed female with history of Sjogren's syndrome, here for follow up for evaluation of memory problems. She presented to today's appointment by herself.      She does not think that anything has changed.  She has devices and strategies to assist with her memory.      She walks everyday and does workout with her .     Patient Active Problem List   Diagnosis    Presence of artificial shoulder joint    Hip pain, left    Bilateral shoulder region arthritis    Cervicalgia    Edema    Excessive thirst    Female stress incontinence    Hyperglycemia    Hypokalemia    Inflammation of joint of shoulder region    Low back pain    Osteoarthritis of both shoulders    Osteoporosis    Pain in joint, shoulder region    Primary osteoarthritis of left hip    Prolapse of vaginal wall    Greater trochanteric bursitis of left hip    Sjogren's syndrome (H)    Dermatitis    Symptomatic menopausal or female climacteric states    Urticaria    Vertigo    Vitamin D deficiency    Peripheral nerve disease    Sicca syndrome (H)    Sciatica    Memory loss       Past Medical History:   Diagnosis Date    Arthritis     OSTEOARTHRITIS BOTH SHOULDERS    Osteoporosis     Peripheral neuropathy     Sjogren's syndrome (H)     Stress incontinence        Past Surgical History:   Procedure Laterality Date    APPENDECTOMY  2008    ARTHROPLASTY SHOULDER Left 2/1/2017    Procedure: ARTHROPLASTY SHOULDER;  Surgeon: Lui Gonzalez MD;  Location: [facility]    CHOLECYSTECTOMY  2008    GYN SURGERY  2012    VAG HYST, OOPHORECTOMY    GYN SURGERY  2008    ANTERIOR COLPORRHAPHY, REPAIR CYSTOCELE    HC ANTER COLPORRHAPHY,BLAD/VAGINA      Description: Anterior Colporrhaphy, Repair Of Cystocele;  Recorded: 02/25/2008;    HC REMOVAL GALLBLADDER      Description:  Cholecystectomy;  Recorded: 10/28/2008;    HYSTERECTOMY  2004    OOPHORECTOMY Bilateral 2004    MA VAGINAL HYSTERECTOMY,UTERUS 250 GMS/<      Description: Vaginal Hysterectomy;  Recorded: 2012;  Comments: for hypermenorrhea/ prolapse; Dr. Octavio LYNN APPENDECTOMY      Description: Appendectomy;  Recorded: 10/28/2008;       Current Outpatient Medications   Medication Sig Dispense Refill    cetirizine HCl 10 MG CAPS Take 1 capsule by mouth daily          Allergies   Allergen Reactions    Cephalexin Itching    Other Environmental Allergy      PN: LW CM1: >>> NO CONTRAST ADVERSE REACTION <<<     Reaction :  PN: LW CM1: >>> NO CONTRAST ADVERSE REACTION <<<     Reaction :       Family History   Problem Relation Age of Onset    Breast Cancer Mother 57.00    Breast Cancer Maternal Grandmother         Unsure of age         Social History     Socioeconomic History    Marital status:      Spouse name: Not on file    Number of children: Not on file    Years of education: Not on file    Highest education level: Not on file   Occupational History    Not on file   Tobacco Use    Smoking status: Former     Types: Cigarettes     Quit date: 1980     Years since quittin.7    Smokeless tobacco: Never   Vaping Use    Vaping Use: Never used   Substance and Sexual Activity    Alcohol use: Yes     Comment: 2-3 GLASSES OF WINE 3 TIMES/WEEK    Drug use: Never    Sexual activity: Yes   Other Topics Concern    Not on file   Social History Narrative    Not on file     Social Determinants of Health     Financial Resource Strain: Not on file   Food Insecurity: Not on file   Transportation Needs: Not on file   Physical Activity: Not on file   Stress: Not on file   Social Connections: Not on file   Interpersonal Safety: Not on file   Housing Stability: Not on file       General Physical examination:    Neurological examination:    Mental status: Ms. Dai  is awake, alert, and appropriate, with fluent speech, no word  finding difficulties and no difficulty in answering questions.     Neuropsychological evaluation:  A full report of the neuropsychological battery testing is available separately. Findings are summarized here briefly.  Dr. Gregg 8/28/2023  Results from the present neuropsychological evaluation were mildly abnormal. While Ms. Dai performed within normal limits across most cognitive domains, she displayed generalized suppression in cognitive scores when compared to her estimated above average baseline. Based on psychometric estimation and her educational/vocational history, her expected baseline is solidly above average. A couple performances reach this range, but most are middle average and a couple are below average. Her learning and memory profile was non-amnestic and in the average range. Specific areas of strength included phonemic fluency, reading/pronunciation, and line orientation judgement. Specific areas of weakness included semantic fluency and aspects of executive functioning including planning and organizing. Psychiatrically, she endorsed significant symptoms of anxiety and depression. Regarding etiology, frontal lobe dysfunction, with the right side more impacted than the left, may be responsible for subtle cognitive changes.      While her performances are likely mildly below her baseline abilities, we are not seeing a clear enough abnormality to make a diagnosis of mild cognitive impairment and certainly not dementia. It may be the case that her high premorbid abilities provide a cognitive reserve, weakening the impact of cognitive decline. Today's data are consistent with her presenting issues, with she and her family noticing differences from her baseline but those difference not yet forcing limitations on daily activities.      In sum, there are signs of early changes in cognition. The cause or syndrome is not yet clear. Right now, the effects seem mostly centered on frontal systems functioning.  Broadly speaking for the memory clinic, we will want to carry out a thorough physical workup and monitor her over time.       Labs:  Lab Results   Component Value Date    WBC 5.5 07/10/2023    RBC 3.85 07/10/2023    HGB 12.6 07/10/2023    HCT 38.5 07/10/2023     07/10/2023    MCH 32.7 07/10/2023    MCHC 32.7 07/10/2023    RDW 12.8 07/10/2023     07/10/2023     07/10/2023    POTASSIUM 4.3 07/10/2023    CHLORIDE 105 07/10/2023    CO2 25 07/10/2023    ANIONGAP 9 07/10/2023    GLC 96 07/10/2023    BUN 16.4 07/10/2023    CR 0.54 07/10/2023    TORIE 9.0 07/10/2023    TSH 2.31 03/15/2023    T4 1.35 03/15/2023    B12 455 07/10/2023        Imaging:  MRI brain 8/1/2023  FINDINGS:  INTRACRANIAL CONTENTS: No acute or subacute infarct. No mass, acute hemorrhage, or extra-axial fluid collections. Scattered nonspecific T2/FLAIR hyperintensities within the cerebral white matter most consistent with mild chronic microvascular ischemic   change. Mild to moderate generalized cerebral atrophy. No hydrocephalus. No disproportionate regional volume loss. Normal position of the cerebellar tonsils.      SELLA: No abnormality accounting for technique.     OSSEOUS STRUCTURES/SOFT TISSUES: Normal marrow signal. The major intracranial vascular flow voids are maintained.      ORBITS: No abnormality accounting for technique.      SINUSES/MASTOIDS: Mild to moderate mucosal thickening scattered about the paranasal sinuses. Scattered fluid/membrane thickening in the left mastoid air cells. Right mastoid air cells essentially clear.                                                                       IMPRESSION:  1.  Brain atrophy and presumed chronic microvascular ischemic changes as detailed above. No dementia specific pattern identified.  2.  No acute intracranial process.    I reviewed the images myself.        Impression:  Ms. Dai is a 73 year old right-handed female with history of Sjogren's syndrome, who presents with  memory problems.  Based on the clinical history, neuropsychological testing and brain imaging, Ms. Dai does not meet the criteria for mild or major neurocogntive disorder.  We discussed to follow up in one year or when she thinks that she needs another evaluation due to memory decline.       Recommendations:  Continue exercise 30 minutes/day  Follow up prn.     I spent 30 minutes total today for this visit, which includes face-to-face with the patient, reviewing the chart (neuropsychological testing, MRI images, lab reports, clinical notes, medications), counseling, and documentation.

## 2023-12-08 ENCOUNTER — TRANSFERRED RECORDS (OUTPATIENT)
Dept: HEALTH INFORMATION MANAGEMENT | Facility: CLINIC | Age: 73
End: 2023-12-08
Payer: COMMERCIAL

## 2023-12-22 ENCOUNTER — NURSE TRIAGE (OUTPATIENT)
Dept: FAMILY MEDICINE | Facility: CLINIC | Age: 73
End: 2023-12-22
Payer: COMMERCIAL

## 2023-12-22 NOTE — TELEPHONE ENCOUNTER
She should be seen for this.  Please direct to urgent care.  Sharona Perez PA-C on 12/22/2023 at 1:14 PM

## 2023-12-22 NOTE — TELEPHONE ENCOUNTER
General Call      Reason for Call:  Requesting RX    What are your questions or concerns:  Pt called and stated that she got food poisoning on Tuesday and been having diarrhea since then. Pt been taking bland food and keeping a light weight food in her systme but still having diarrhea. Was wondering if PCP can prescribe her some antibiotic to her pharmacy on file. PCP is out of clinic and pt would like some before the long holiday weekend here. Please call and advise, thanks!    Date of last appointment with provider: 7/10/2023    Could we send this information to you in [employer] or would you prefer to receive a phone call?:   Patient would prefer a phone call   Okay to leave a detailed message?: Yes at Home number on file [phone] (home)

## 2023-12-22 NOTE — TELEPHONE ENCOUNTER
Patient declined UC.     She is having about 2-3 episodes of loose stool per day. No fever, abdominal pain, nausea/vomiting, or blood/mucous in stool. She is hydrating with water and gatorade. Following BRAT diet. No dizziness, weakness, or concern for dehydration.     Discussed that diarrhea is typically viral and antibiotics may not be effective--evaluation would be needed for this, which she declines.     Home cares discussed, she wishes to continue to monitor her symptoms. Will call back or present to UC if she worsens.     Protocol Recommended Disposition:   Home Care    Reason for Disposition   MILD-MODERATE diarrhea (e.g., 1-6 times / day more than normal)    Additional Information   Negative: Shock suspected (e.g., cold/pale/clammy skin, too weak to stand, low BP, rapid pulse)   Negative: Difficult to awaken or acting confused (e.g., disoriented, slurred speech)   Negative: Sounds like a life-threatening emergency to the triager   Negative: Vomiting also present and worse than the diarrhea   Negative: Blood in stool and without diarrhea   Negative: SEVERE abdominal pain (e.g., excruciating) and present > 1 hour   Negative: SEVERE abdominal pain and age > 60 years   Negative: Bloody, black, or tarry bowel movements  (Exception: Chronic-unchanged black-grey bowel movements and is taking iron pills or Pepto-Bismol.)   Negative: SEVERE diarrhea (e.g., 7 or more times / day more than normal) and age > 60 years   Negative: Constant abdominal pain lasting > 2 hours   Negative: Drinking very little and dehydration suspected (e.g., no urine > 12 hours, very dry mouth, very lightheaded)   Negative: Patient sounds very sick or weak to the triager   Negative: SEVERE diarrhea (e.g., 7 or more times / day more than normal) and present > 24 hours (1 day)   Negative: MODERATE diarrhea (e.g., 4-6 times / day more than normal) and present > 48 hours (2 days)   Negative: MODERATE diarrhea (e.g., 4-6 times / day more than  normal) and age > 70 years   Negative: Abdominal pain (Exception: Pain clears completely with each passage of diarrhea stool.)   Negative: Fever > 101 F (38.3 C)   Negative: Blood in the stool  (Exception: Only on toilet paper. Reason: Diarrhea can cause rectal irritation with blood on wiping.)   Negative: Mucus or pus in stool has been present > 2 days and diarrhea is more than mild   Negative: Weak immune system (e.g., HIV positive, cancer chemo, splenectomy, organ transplant, chronic steroids)   Negative: Travel to a foreign country in past month   Negative: Recent antibiotic therapy (i.e., within last 2 months) and diarrhea present > 3 days since antibiotic was stopped   Negative: Recent hospitalization and diarrhea present > 3 days   Negative: Tube feedings (e.g., nasogastric, g-tube, j-tube)   Negative: MILD diarrhea (e.g., 1-3 or more stools than normal in past 24 hours) diarrhea without known cause and present > 7 days   Negative: Patient wants to be seen   Negative: Diarrhea is a chronic symptom (recurrent or ongoing AND lasting > 4 weeks)   Negative: SEVERE diarrhea (e.g., 7 or more times / day more than normal)    Protocols used: Diarrhea-A-[place]

## 2024-01-08 ENCOUNTER — ALLIED HEALTH/NURSE VISIT (OUTPATIENT)
Dept: AUDIOLOGY | Facility: CLINIC | Age: 74
End: 2024-01-08
Payer: COMMERCIAL

## 2024-01-08 DIAGNOSIS — H90.3 SENSORINEURAL HEARING LOSS, BILATERAL: Primary | ICD-10-CM

## 2024-01-08 PROCEDURE — V5014 HEARING AID REPAIR/MODIFYING: HCPCS | Performed by: AUDIOLOGIST

## 2024-01-08 NOTE — PROGRESS NOTES
"HEARING AID DROP OFF    Patient dropped both Phonak Audeo M70-R ALEJANDRO hearing aids off (warranty expiration 10/10/2023) with the complaint, \" doesn't always charge fully when in the case overnight.\"    Cleaned and checked both hearing aids, replaced domes and wax traps, and vacuumed microphone ports. Listening check revealed proper function of the hearing aids.     Called Cliff to get more information of battery drain issue. She did not answer. Left a voicemail. Informed her since hearing aids are no longer in warranty they would need to be sent in for an out of warranty repair to address the charging issue, which is $225.00 per hearing aid with a 6 month warranty, or $275.00 per hearing aid with a 12 month warranty. Another option would be to try purchasing a new , but no guarantee that this would solve the problem. The  is $120.00.     If the charging issue is not causing excessive difficulty, and is intermittent she could continue to use the hearing aids as is for now. Left call back number for her to let us know how to proceed with the hearing aids.     Ralf Birch, CCC-A  [place] Licensed Audiologist #[id number]   "

## 2024-04-04 ENCOUNTER — TELEPHONE (OUTPATIENT)
Dept: FAMILY MEDICINE | Facility: CLINIC | Age: 74
End: 2024-04-04
Payer: COMMERCIAL

## 2024-04-04 DIAGNOSIS — Z71.84 TRAVEL ADVICE ENCOUNTER: Primary | ICD-10-CM

## 2024-04-04 NOTE — TELEPHONE ENCOUNTER
General Call      Reason for Call:  Patient is going to [facility] May 8 . Shwe will be gone for 2 1/2 weeks    What are your questions or concerns:  She would like a  written RX for paxlovid in case she gets COVID    Date of last appointment with provider: NA    Could we send this information to you in [employer] or would you prefer to receive a phone call?:   Patient would prefer a phone call   Okay to leave a detailed message?: Yes at Home number on file [phone] (home)

## 2024-04-08 NOTE — TELEPHONE ENCOUNTER
Patient calling back to check the status of this request. Pt still had not gotten an answer and I do not see any confirmation on the other TE.

## 2024-04-10 NOTE — TELEPHONE ENCOUNTER
Pt has called multiple times as she states she still hasn't gotten a response from anyone about this request. Please give pt a call back asap to let her if this is approved or not.  Thanks!

## 2024-04-11 NOTE — TELEPHONE ENCOUNTER
I have sent a prescription to her pharmacy.  (I'm not sure if she can get this without needing it for actual infection.)

## 2024-06-05 ENCOUNTER — OFFICE VISIT (OUTPATIENT)
Dept: FAMILY MEDICINE | Facility: CLINIC | Age: 74
End: 2024-06-05
Payer: COMMERCIAL

## 2024-06-05 ENCOUNTER — ANCILLARY PROCEDURE (OUTPATIENT)
Dept: GENERAL RADIOLOGY | Facility: CLINIC | Age: 74
End: 2024-06-05
Attending: FAMILY MEDICINE
Payer: COMMERCIAL

## 2024-06-05 VITALS
DIASTOLIC BLOOD PRESSURE: 66 MMHG | OXYGEN SATURATION: 97 % | HEIGHT: 65 IN | BODY MASS INDEX: 21.99 KG/M2 | WEIGHT: 132 LBS | TEMPERATURE: 97.6 F | RESPIRATION RATE: 18 BRPM | SYSTOLIC BLOOD PRESSURE: 102 MMHG | HEART RATE: 64 BPM

## 2024-06-05 DIAGNOSIS — M54.50 ACUTE LEFT-SIDED LOW BACK PAIN WITHOUT SCIATICA: ICD-10-CM

## 2024-06-05 DIAGNOSIS — M54.50 ACUTE LEFT-SIDED LOW BACK PAIN WITHOUT SCIATICA: Primary | ICD-10-CM

## 2024-06-05 PROCEDURE — 99213 OFFICE O/P EST LOW 20 MIN: CPT | Performed by: FAMILY MEDICINE

## 2024-06-05 PROCEDURE — 72100 X-RAY EXAM L-S SPINE 2/3 VWS: CPT | Mod: TC | Performed by: STUDENT IN AN ORGANIZED HEALTH CARE EDUCATION/TRAINING PROGRAM

## 2024-06-05 ASSESSMENT — ENCOUNTER SYMPTOMS: BACK PAIN: 1

## 2024-06-05 NOTE — PROGRESS NOTES
1. Acute left-sided low back pain without sciatica  This is a 72 yo female with left sided low back pain - no sciatica symptoms - xray of the lumbar spine was ordered - and reviewed personally by me .  She does have some scoliosis/curvature of spine.  As well, has multi level degenerative changes.  Will refer to Spine Clinic for further therapeutic options.    - XR Lumbar Spine 2/3 Views; Future  - Spine  Referral; Future      Subjective   Cliff is a 73 year old, presenting for the following health issues:  Back Pain (Interested in a cortisone injection for her lower back, seen a lot of other providers for this)        6/5/2024     2:59 PM   Additional Questions   Roomed by Rosalind     Recently traveled to [facility] - 2-1/2 weeks   Right hand sore due to carryon bag    Lumbar pain -   Pain stopped because she came here  Taking 2 x 500 mg rapid Tylenol at a time -   Throbbing a lot -   Working with   - at home   Using voltaren gel - puts it on her neck/lower back -               History of Present Illness       Back Pain:  She presents for follow up of back pain. Patient's back pain is a recurring problem.  Location of back pain:  Left lower back  Description of back pain: dull ache  Back pain spreads: nowhere    Since patient first noticed back pain, pain is: always present, but gets better and worse  Does back pain interfere with her job:  No       She eats 2-3 servings of fruits and vegetables daily.She consumes 0 sweetened beverage(s) daily.She exercises with enough effort to increase her heart rate 30 to 60 minutes per day.    She is taking medications regularly.         Review of Systems   Constitutional:  Positive for fatigue. Negative for chills and fever.   HENT:  Negative for ear pain and sore throat.    Eyes:  Negative for pain and visual disturbance.   Respiratory:  Negative for cough and shortness of breath.    Cardiovascular:  Negative for chest pain and palpitations.   Gastrointestinal:   "Negative for abdominal pain and vomiting.   Genitourinary:  Negative for dysuria and hematuria.   Musculoskeletal:  Positive for back pain. Negative for arthralgias.   Skin:  Negative for color change and rash.   Neurological:  Negative for seizures and syncope.   All other systems reviewed and are negative.          Objective    /66 (BP Location: Right arm, Patient Position: Sitting, Cuff Size: Adult Regular)   Pulse 64   Temp 97.6  F (36.4  C) (Temporal)   Resp 18   Ht 1.65 m (5' 4.96\")   Wt 59.9 kg (132 lb)   LMP  (LMP Unknown)   SpO2 97%   BMI 21.99 kg/m    Body mass index is 21.99 kg/m .  Physical Exam  Vitals reviewed.   Constitutional:       General: She is not in acute distress.     Appearance: Normal appearance.   HENT:      Head: Normocephalic.      Right Ear: Tympanic membrane, ear canal and external ear normal.      Left Ear: Tympanic membrane, ear canal and external ear normal.      Nose: Nose normal.      Mouth/Throat:      Mouth: Mucous membranes are moist.      Pharynx: No posterior oropharyngeal erythema.   Eyes:      Extraocular Movements: Extraocular movements intact.      Conjunctiva/sclera: Conjunctivae normal.      Pupils: Pupils are equal, round, and reactive to light.   Cardiovascular:      Rate and Rhythm: Normal rate and regular rhythm.      Pulses: Normal pulses.      Heart sounds: Normal heart sounds. No murmur heard.  Pulmonary:      Effort: Pulmonary effort is normal.      Breath sounds: Normal breath sounds.   Abdominal:      Palpations: Abdomen is soft. There is no mass.      Tenderness: There is no abdominal tenderness. There is no guarding or rebound.   Musculoskeletal:         General: No deformity. Normal range of motion.      Cervical back: Normal range of motion and neck supple.      Comments: Mild curvature to lower lumbar spine     Lymphadenopathy:      Cervical: No cervical adenopathy.   Skin:     General: Skin is warm and dry.   Neurological:      General: No " focal deficit present.      Mental Status: She is alert.   Psychiatric:         Mood and Affect: Mood normal.         Behavior: Behavior normal.            Results for orders placed or performed in visit on 06/05/24   XR Lumbar Spine 2/3 Views     Status: None    Narrative    EXAM: XR LUMBAR SPINE 2/3 VIEWS  LOCATION: [facility]  DATE: 6/5/2024    INDICATION: low back pain    left sided   no sciatica  COMPARISON: Lumbar spine MRI: 1/6/2016.      Impression    IMPRESSION:     1.  No radiographic evidence of acute fracture involving the lumbar spine. Vertebral body heights are maintained.  2.  Lumbar dextrocurvature with apex at L3-L4. Retrolisthesis of L3 on L4 measuring approximately 4 mm. 8 mm leftward translation of L2 on L3.  3.  Multilevel degenerative disc disease throughout the lumbar spine with disc height loss greatest and advanced on the left at L2-L3 and at L4-L5. Multilevel facet arthropathy.  4.  No focal extraspinal soft tissue abnormality is evident.             Signed Electronically by: MARIUM KRUEGER MD      Prior to immunization administration, verified patients identity using patient s name and date of birth. Please see Immunization Activity for additional information.     Screening Questionnaire for Adult Immunization    Are you sick today?   No   Do you have allergies to medications, food, a vaccine component or latex?   No   Have you ever had a serious reaction after receiving a vaccination?   No   Do you have a long-term health problem with heart, lung, kidney, or metabolic disease (e.g., diabetes), asthma, a blood disorder, no spleen, complement component deficiency, a cochlear implant, or a spinal fluid leak?  Are you on long-term aspirin therapy?   No   Do you have cancer, leukemia, HIV/AIDS, or any other immune system problem?   No   Do you have a parent, brother, or sister with an immune system problem?   No   In the past 3 months, have you taken  medications that affect  your immune system, such as prednisone, other steroids, or anticancer drugs; drugs for the treatment of rheumatoid arthritis, Crohn s disease, or psoriasis; or have you had radiation treatments?   No   Have you had a seizure, or a brain or other nervous system problem?   No   During the past year, have you received a transfusion of blood or blood    products, or been given immune (gamma) globulin or antiviral drug?   No   For women: Are you pregnant or is there a chance you could become       pregnant during the next month?   No   Have you received any vaccinations in the past 4 weeks?   No     Immunization questionnaire answers were all negative.      Patient instructed to remain in clinic for 15 minutes afterwards, and to report any adverse reactions.     Screening performed by Rosalind Pleitez CMA on 6/5/2024 at 3:02 PM.

## 2024-06-13 ENCOUNTER — MYC MEDICAL ADVICE (OUTPATIENT)
Dept: FAMILY MEDICINE | Facility: CLINIC | Age: 74
End: 2024-06-13
Payer: COMMERCIAL

## 2024-06-14 ENCOUNTER — TRANSFERRED RECORDS (OUTPATIENT)
Dept: HEALTH INFORMATION MANAGEMENT | Facility: CLINIC | Age: 74
End: 2024-06-14
Payer: COMMERCIAL

## 2024-06-14 NOTE — TELEPHONE ENCOUNTER
Writer responded to patient message via [employer].     Narendra Sands, MSN, RN   [facility]

## 2024-06-16 ASSESSMENT — ENCOUNTER SYMPTOMS
COUGH: 0
SEIZURES: 0
SORE THROAT: 0
ABDOMINAL PAIN: 0
PALPITATIONS: 0
ARTHRALGIAS: 0
SHORTNESS OF BREATH: 0
COLOR CHANGE: 0
DYSURIA: 0
FEVER: 0
FATIGUE: 1
CHILLS: 0
HEMATURIA: 0
VOMITING: 0
EYE PAIN: 0

## 2024-06-18 ENCOUNTER — OFFICE VISIT (OUTPATIENT)
Dept: FAMILY MEDICINE | Facility: CLINIC | Age: 74
End: 2024-06-18
Payer: COMMERCIAL

## 2024-06-18 VITALS
DIASTOLIC BLOOD PRESSURE: 68 MMHG | TEMPERATURE: 97.3 F | HEIGHT: 65 IN | OXYGEN SATURATION: 97 % | HEART RATE: 103 BPM | BODY MASS INDEX: 21.66 KG/M2 | RESPIRATION RATE: 18 BRPM | SYSTOLIC BLOOD PRESSURE: 112 MMHG | WEIGHT: 130 LBS

## 2024-06-18 DIAGNOSIS — F41.9 ANXIETY: ICD-10-CM

## 2024-06-18 DIAGNOSIS — R07.89 ATYPICAL CHEST PAIN: Primary | ICD-10-CM

## 2024-06-18 PROCEDURE — 99213 OFFICE O/P EST LOW 20 MIN: CPT | Performed by: FAMILY MEDICINE

## 2024-06-18 NOTE — PROGRESS NOTES
"1. Atypical chest pain  This is a 72 yo female with atypical chest pain - she was evaluated in the ER - workup normal.  She is now very concerned that this is likely ischemia (in spite of workup) - and is very anxious about this.  We discussed - will refer to Cardiology -    I have reviewed available ER records,  notes, as well as imaging and lab results.  In addition I have reviewed the medication list and made any adjustments as indicated in orders.    - Adult Cardiology Eval  Referral; Future    2. Anxiety  Patient has developed significant anxiety (?part of her cognitive decline).  May contribute to her pain syndrome.  Discussed and reviewed all of her results for her, this helped to relieve her anxiety about all of this.          Ethan Coronado is a 73 year old, presenting for the following health issues:  ER F/U        6/18/2024     9:56 AM   Additional Questions   Roomed by Rosalind     Went to ER with chest pain   Had googled this - and was told if you were female and thought you might be having a heart attack, you should go directly to ER   Was in \"99th percentile\" for troponin and so was worried about this.      Had been touching her chest wall - found a small area of tenderness on left lateral chest into left axilla  So, that's when she went online to google - found that 5% of people die on the way, so called 911 - tells me it didn't connect from her condo  -              Review of Systems   Constitutional:  Negative for chills and fever.   HENT:  Negative for ear pain and sore throat.    Eyes:  Negative for pain and visual disturbance.   Respiratory:  Negative for cough and shortness of breath.    Cardiovascular:  Positive for chest pain. Negative for palpitations.   Gastrointestinal:  Negative for abdominal pain and vomiting.   Genitourinary:  Negative for dysuria and hematuria.   Musculoskeletal:  Negative for arthralgias and back pain.   Skin:  Negative for color change and rash. " "  Neurological:  Negative for seizures and syncope.   Hematological:  Does not bruise/bleed easily.   Psychiatric/Behavioral:  The patient is nervous/anxious.    All other systems reviewed and are negative.          Objective    /68 (BP Location: Right arm, Patient Position: Sitting, Cuff Size: Adult Regular)   Pulse 103   Temp 97.3  F (36.3  C) (Temporal)   Resp 18   Ht 1.65 m (5' 4.96\")   Wt 59 kg (130 lb)   LMP  (LMP Unknown)   SpO2 97%   BMI 21.66 kg/m    Body mass index is 21.66 kg/m .  Physical Exam  Vitals reviewed.   Constitutional:       General: She is not in acute distress.     Appearance: Normal appearance.   HENT:      Head: Normocephalic.      Right Ear: Tympanic membrane, ear canal and external ear normal.      Left Ear: Tympanic membrane, ear canal and external ear normal.      Nose: Nose normal.      Mouth/Throat:      Mouth: Mucous membranes are moist.      Pharynx: No posterior oropharyngeal erythema.   Eyes:      Extraocular Movements: Extraocular movements intact.      Conjunctiva/sclera: Conjunctivae normal.      Pupils: Pupils are equal, round, and reactive to light.   Cardiovascular:      Rate and Rhythm: Normal rate and regular rhythm.      Pulses: Normal pulses.      Heart sounds: Normal heart sounds. No murmur heard.  Pulmonary:      Effort: Pulmonary effort is normal.      Breath sounds: Normal breath sounds.   Abdominal:      Palpations: Abdomen is soft. There is no mass.      Tenderness: There is no abdominal tenderness. There is no guarding or rebound.   Musculoskeletal:         General: No deformity. Normal range of motion.      Cervical back: Normal range of motion and neck supple.   Lymphadenopathy:      Cervical: No cervical adenopathy.   Skin:     General: Skin is warm and dry.   Neurological:      General: No focal deficit present.      Mental Status: She is alert.   Psychiatric:      Comments: Mild cognitive deficit  anxiety            No results found for any " visits on 06/18/24.        Signed Electronically by: MARIUM KRUEGER MD      Prior to immunization administration, verified patients identity using patient s name and date of birth. Please see Immunization Activity for additional information.     Screening Questionnaire for Adult Immunization    Are you sick today?   No   Do you have allergies to medications, food, a vaccine component or latex?   No   Have you ever had a serious reaction after receiving a vaccination?   No   Do you have a long-term health problem with heart, lung, kidney, or metabolic disease (e.g., diabetes), asthma, a blood disorder, no spleen, complement component deficiency, a cochlear implant, or a spinal fluid leak?  Are you on long-term aspirin therapy?   No   Do you have cancer, leukemia, HIV/AIDS, or any other immune system problem?   No   Do you have a parent, brother, or sister with an immune system problem?   No   In the past 3 months, have you taken medications that affect  your immune system, such as prednisone, other steroids, or anticancer drugs; drugs for the treatment of rheumatoid arthritis, Crohn s disease, or psoriasis; or have you had radiation treatments?   No   Have you had a seizure, or a brain or other nervous system problem?   No   During the past year, have you received a transfusion of blood or blood    products, or been given immune (gamma) globulin or antiviral drug?   No   For women: Are you pregnant or is there a chance you could become       pregnant during the next month?   No   Have you received any vaccinations in the past 4 weeks?   No     Immunization questionnaire answers were all negative.      Patient instructed to remain in clinic for 15 minutes afterwards, and to report any adverse reactions.     Screening performed by Rosalind Pleitez CMA on 6/18/2024 at 9:59 AM.

## 2024-06-21 ENCOUNTER — TRANSFERRED RECORDS (OUTPATIENT)
Dept: HEALTH INFORMATION MANAGEMENT | Facility: CLINIC | Age: 74
End: 2024-06-21
Payer: COMMERCIAL

## 2024-06-21 ENCOUNTER — MYC MEDICAL ADVICE (OUTPATIENT)
Dept: FAMILY MEDICINE | Facility: CLINIC | Age: 74
End: 2024-06-21
Payer: COMMERCIAL

## 2024-06-21 NOTE — TELEPHONE ENCOUNTER
General Call    Contacts       Contact Date/Time Type Contact Phone/Fax    06/21/2024 11:24 AM CDT Phone (Incoming) Cliff Dai (Self) [phone] (M)          Reason for Call:  call     What are your questions or concerns:  Cliff is asking for a call to discuss. Offered to schedule appointment and she declined.     Date of last appointment with provider: NA    Could we send this information to you in [facility] or would you prefer to receive a phone call?:   Patient would prefer a phone call   Okay to leave a detailed message?: Yes at Cell number on file:    Telephone Information:   Mobile [phone]

## 2024-06-23 ASSESSMENT — ENCOUNTER SYMPTOMS
FEVER: 0
EYE PAIN: 0
ARTHRALGIAS: 0
BRUISES/BLEEDS EASILY: 0
VOMITING: 0
DYSURIA: 0
SHORTNESS OF BREATH: 0
PALPITATIONS: 0
HEMATURIA: 0
BACK PAIN: 0
CHILLS: 0
ABDOMINAL PAIN: 0
COLOR CHANGE: 0
SORE THROAT: 0
COUGH: 0
NERVOUS/ANXIOUS: 1
SEIZURES: 0

## 2024-06-24 NOTE — TELEPHONE ENCOUNTER
Writer responded to patient message via [employer].     Narendra Sands, MSN, RN   [facility]

## 2024-06-26 RX ORDER — CLOBETASOL PROPIONATE 0.5 MG/G
CREAM TOPICAL
COMMUNITY
Start: 2024-06-14 | End: 2024-07-03

## 2024-06-26 RX ORDER — CYCLOSPORINE 0.5 MG/ML
1 EMULSION OPHTHALMIC 2 TIMES DAILY
COMMUNITY
Start: 2024-05-02

## 2024-06-28 ENCOUNTER — OFFICE VISIT (OUTPATIENT)
Dept: PHYSICAL MEDICINE AND REHAB | Facility: CLINIC | Age: 74
End: 2024-06-28
Attending: FAMILY MEDICINE
Payer: COMMERCIAL

## 2024-06-28 VITALS
SYSTOLIC BLOOD PRESSURE: 115 MMHG | HEART RATE: 69 BPM | BODY MASS INDEX: 22.08 KG/M2 | DIASTOLIC BLOOD PRESSURE: 57 MMHG | HEIGHT: 65 IN | WEIGHT: 132.5 LBS

## 2024-06-28 DIAGNOSIS — M47.816 LUMBAR SPONDYLOSIS: ICD-10-CM

## 2024-06-28 DIAGNOSIS — M54.50 CHRONIC LEFT-SIDED LOW BACK PAIN WITHOUT SCIATICA: ICD-10-CM

## 2024-06-28 DIAGNOSIS — M51.369 LUMBAR DEGENERATIVE DISC DISEASE: Primary | ICD-10-CM

## 2024-06-28 DIAGNOSIS — G89.29 CHRONIC LEFT-SIDED LOW BACK PAIN WITHOUT SCIATICA: ICD-10-CM

## 2024-06-28 PROCEDURE — 99204 OFFICE O/P NEW MOD 45 MIN: CPT | Performed by: STUDENT IN AN ORGANIZED HEALTH CARE EDUCATION/TRAINING PROGRAM

## 2024-06-28 RX ORDER — ACETAMINOPHEN 500 MG
1000 TABLET ORAL 2 TIMES DAILY PRN
COMMUNITY

## 2024-06-28 ASSESSMENT — PAIN SCALES - GENERAL: PAINLEVEL: NO PAIN (0)

## 2024-06-28 NOTE — LETTER
6/28/2024      Cliff Dai  [address]  [address]  [place] [address]      Dear Colleague,    Thank you for referring your patient, Cliff Dai, to the [facility]. Please see a copy of my visit note below.    ASSESSMENT:  Cliff Dai is a 73 year old female presents for consultation at the request of Hyun Fragoso* who presents today for new patient evaluation of :         Diagnoses and all orders for this visit:  Lumbar degenerative disc disease  -     MR Lumbar Spine w/o Contrast; Future  Chronic left-sided low back pain without sciatica  -     Spine  Referral  Lumbar spondylosis  -     MR Lumbar Spine w/o Contrast; Future       Patient is neurologically intact on exam. No myelopathic or red flag symptoms.    Patient is a 73-year-old female with history of chronic low back pain who is presenting for evaluation of the same.  Pain is left-sided and axial in nature suggesting involvement of the mid lumbar facets or disc levels.  As pain has been chronically present without improvement despite conservative measures which have been ongoing for several weeks, patient would now benefit from advanced imaging such as an MRI to better evaluate soft tissue and neural elements.  This was discussed with the patient and she is in agreement.  We will follow-up once MRI is completed.    PLAN:  Reviewed spine anatomy and disease process. Discussed diagnosis and treatment options with the patient today. A shared decision making model was used. The patient's values and choices were respected. The following represents what was discussed and decided upon by the provider and the patient.    1. DIAGNOSTIC TESTS  MRI of lumbar spine was ordered for further characterization of soft tissues and/or neural compression    2. PHYSICAL THERAPY  Patient sees a chiropractor regularly.  Patient also does a daily exercise program under the supervision of a   Discussed the  importance of core strengthening, ROM, stretching exercises with the patient and how each of these entities is important in decreasing pain.  Explained to the patient that the purpose of physical therapy is to teach the patient a home exercise program.  These exercises need to be performed every day in order to decrease pain and prevent future occurrences of pain.  Likened it to brushing one's teeth.      3. MEDICATIONS:  Discussed multiple medication options today with patient. Discussed risks, side effects, and proper use of medications. Patient verbalized understanding.  No new medications ordered at this visit.    4. INTERVENTIONS:  Patient requires further imaging to assess what interventional options may be best for them.    5. OTHER REFERRALS:  No other referrals at this time.    6. FOLLOW-UP  To review imaging results once imaging is completed  Patient advised to contact our office for earlier appointment if symptoms worsening or not improving, or any side effects are noticed.    Advised patient to call the [facility] Center if symptoms worsen or you have problems controlling bladder and bowel function.   ______________________________________________________________________    SUBJECTIVE:   Cliff Dai  is a 73 year old female who presents today for new patient evaluation.    Patient reports she has left-sided low back pain in the mid lumbar area.  Cannot identify any correlation to specific activities or specific positions.  Pain is by report constant throughout the day.  Patient denies any radicular component, no numbness or tingling of the legs, no weakness, no bladder or bowel incontinence.  Pain has been ongoing for several years per her recall.    Patient reports in the past she had an injection done at [facility] which provided some relief but wore off.  Patient sees a chiropractor regularly twice a week, and also works with a  on a daily basis for spine and general fitness.    No prior back  surgeries    -Treatment to Date: As above      Oswestry (YESENIA) Questionnaire        6/28/2024    10:45 AM   OSWESTRY DISABILITY INDEX   Count 9   Sum 8   Oswestry Score (%) 17.78 %       Neck Disability Index:      6/28/2024    10:50 AM   Neck Disability Index (  Feliciano MCKEON and Concepción CHAUHAN 1991. All rights reserved.; used with permission)   SECTION 1 - PAIN INTENSITY 2   SECTION 2 - PERSONAL CARE 0   SECTION 3 - LIFTING 3   SECTION 4 - READING 0   SECTION 5 - HEADACHES 3   SECTION 6 - CONCENTRATION 1   SECTION 7 - WORK 1   SECTION 8 - DRIVING 0   SECTION 9 - SLEEPING 0   SECTION 10 - RECREATION 0   Count 10   Sum 10   Raw Score: /50 10   Neck Disability Index Score: (%) 20 %              -Medications:    Current Outpatient Medications   Medication Sig Dispense Refill     acetaminophen (TYLENOL) 500 MG tablet Take 1,000 mg by mouth 2 times daily as needed for mild pain       clobetasol propionate (TEMOVATE) 0.05 % external cream 1 APPLICATION THREE TIMES A DAY AS NEEDED TOPICALLY TO BUG BITE ON CHEST FOR UP TO 3 WEEKS AT A TIME       cycloSPORINE (RESTASIS) 0.05 % ophthalmic emulsion Place 1 drop into both eyes 2 times daily       Current Facility-Administered Medications   Medication Dose Route Frequency Provider Last Rate Last Admin     respiratory syncytial virus vaccine, bivalent (ABRYSVO) injection 0.5 mL  0.5 mL Intramuscular Once Hyun Mcgovern MD           Allergies   Allergen Reactions     Cephalexin Itching     Glucose GI Disturbance     Gas     Lactose GI Disturbance     Gas     Other Environmental Allergy      PN: LW CM1: >>> NO CONTRAST ADVERSE REACTION <<<     Reaction :  PN: LW CM1: >>> NO CONTRAST ADVERSE REACTION <<<     Reaction :       Past Medical History:   Diagnosis Date     Arthritis     OSTEOARTHRITIS BOTH SHOULDERS     Osteoporosis      Peripheral neuropathy      Sjogren's syndrome (H24)      Stress incontinence         Patient Active Problem List   Diagnosis     Presence of  artificial shoulder joint     Hip pain, left     Bilateral shoulder region arthritis     Cervicalgia     Edema     Excessive thirst     Female stress incontinence     Hyperglycemia     Hypokalemia     Inflammation of joint of shoulder region     Low back pain     Osteoarthritis of both shoulders     Osteoporosis     Pain in joint, shoulder region     Primary osteoarthritis of left hip     Prolapse of vaginal wall     Greater trochanteric bursitis of left hip     Sjogren's syndrome (H24)     Dermatitis     Symptomatic menopausal or female climacteric states     Urticaria     Vertigo     Vitamin D deficiency     Peripheral nerve disease     Sicca syndrome (H24)     Sciatica     Memory loss       Past Surgical History:   Procedure Laterality Date     APPENDECTOMY  2008     ARTHROPLASTY SHOULDER Left 2/1/2017    Procedure: ARTHROPLASTY SHOULDER;  Surgeon: Lui Gonzalez MD;  Location: [facility] OR     CHOLECYSTECTOMY  2008     GYN SURGERY  2012    VAG HYST, OOPHORECTOMY     GYN SURGERY  2008    ANTERIOR COLPORRHAPHY, REPAIR CYSTOCELE     HC ANTER COLPORRHAPHY,BLAD/VAGINA      Description: Anterior Colporrhaphy, Repair Of Cystocele;  Recorded: 02/25/2008;     HC REMOVAL GALLBLADDER      Description: Cholecystectomy;  Recorded: 10/28/2008;     HYSTERECTOMY  2004     OOPHORECTOMY Bilateral 2004     TX VAGINAL HYSTERECTOMY,UTERUS 250 GMS/<      Description: Vaginal Hysterectomy;  Recorded: 02/25/2012;  Comments: for hypermenorrhea/ prolapse; Dr. Octavio LYNN APPENDECTOMY      Description: Appendectomy;  Recorded: 10/28/2008;       Family History   Problem Relation Age of Onset     Breast Cancer Mother 57.00     Breast Cancer Maternal Grandmother         Unsure of age       Reviewed past medical, surgical, and family history with patient found on new patient intake packet located in EMR Media tab.     SOCIAL HX: Reviewed    ROS: Specifically negative for bowel/bladder dysfunction, balance changes, headache, dizziness,  "foot drop, fevers, chills, appetite changes, nausea/vomiting, unexplained weight loss. Otherwise 13 systems reviewed are negative. Please see the patient's intake questionnaire from today for details.    OBJECTIVE:  /57 (BP Location: Left arm, Patient Position: Sitting, Cuff Size: Adult Regular)   Pulse 69   Ht 5' 4.96\" (1.65 m)   Wt 132 lb 8 oz (60.1 kg)   LMP  (LMP Unknown)   BMI 22.08 kg/m      PHYSICAL EXAMINATION:  --CONSTITUTIONAL: Vital signs as above. No acute distress. The patient is well nourished and well groomed.  --PSYCHIATRIC: The patient is awake, alert, oriented to person, place, time and answering questions appropriately with clear speech. Appropriate mood and affect   --RESPIRATORY: Normal rhythm and effort. No abnormal accessory muscle breathing patterns noted.   --GROSS MOTOR: Easily arises from a seated position.  --LUMBAR SPINE: Inspection reveals no evidence of deformity. Range of motion is not limited in flexion, extension, lateral rotation. Mild tenderness to palpation of lumbar paraspinals, no tenderness in spinous processes.  Facet loading (Jade test) negative, seated SLR negative  --HIPS: Full range of motion bilaterally.  --LOWER EXTREMITY MOTOR TESTING:  Hip flexion left 5/5, right 5/5  Knee extension left 5/5, right 5/5  Ankle dorsiflexors left 5/5, right 5/5  Ankle plantarflexors left 5/5, right 5/5   Great toe extension left 5/5, right 5/5   Knee flexion left 5/5, right 5/5  --NEUROLOGIC: Sensation to lower extremities is intact bilaterally in L2-S1 dermatomes.  Negative Espinoza's bilaterally. Reflexes intact in BLE, no clonus.  --VASCULAR: Warm upper limbs bilaterally. Capillary refill in the upper extremities is less than 1 second.    RESULTS: Available medical records from [facility] and any other outside records were reviewed today.     Imaging:  Available relevant imaging was personally reviewed and interpreted today. The images were shown to the patient and " the findings were explained using a spine model.    XR Lumbar Spine 2/3 Views    Result Date: 6/6/2024  EXAM: XR LUMBAR SPINE 2/3 VIEWS LOCATION: [facility] DATE: 6/5/2024 INDICATION: low back pain    left sided   no sciatica COMPARISON: Lumbar spine MRI: 1/6/2016.     IMPRESSION: 1.  No radiographic evidence of acute fracture involving the lumbar spine. Vertebral body heights are maintained. 2.  Lumbar dextrocurvature with apex at L3-L4. Retrolisthesis of L3 on L4 measuring approximately 4 mm. 8 mm leftward translation of L2 on L3. 3.  Multilevel degenerative disc disease throughout the lumbar spine with disc height loss greatest and advanced on the left at L2-L3 and at L4-L5. Multilevel facet arthropathy. 4.  No focal extraspinal soft tissue abnormality is evident.      Again, thank you for allowing me to participate in the care of your patient.        Sincerely,        Jadon Chavez MD

## 2024-06-28 NOTE — PROGRESS NOTES
ASSESSMENT:  Cliff Dai is a 73 year old female presents for consultation at the request of Hyun Fragoso* who presents today for new patient evaluation of :         Diagnoses and all orders for this visit:  Lumbar degenerative disc disease  -     MR Lumbar Spine w/o Contrast; Future  Chronic left-sided low back pain without sciatica  -     Spine  Referral  Lumbar spondylosis  -     MR Lumbar Spine w/o Contrast; Future       Patient is neurologically intact on exam. No myelopathic or red flag symptoms.    Patient is a 73-year-old female with history of chronic low back pain who is presenting for evaluation of the same.  Pain is left-sided and axial in nature suggesting involvement of the mid lumbar facets or disc levels.  As pain has been chronically present without improvement despite conservative measures which have been ongoing for several weeks, patient would now benefit from advanced imaging such as an MRI to better evaluate soft tissue and neural elements.  This was discussed with the patient and she is in agreement.  We will follow-up once MRI is completed.    PLAN:  Reviewed spine anatomy and disease process. Discussed diagnosis and treatment options with the patient today. A shared decision making model was used. The patient's values and choices were respected. The following represents what was discussed and decided upon by the provider and the patient.    1. DIAGNOSTIC TESTS  MRI of lumbar spine was ordered for further characterization of soft tissues and/or neural compression    2. PHYSICAL THERAPY  Patient sees a chiropractor regularly.  Patient also does a daily exercise program under the supervision of a   Discussed the importance of core strengthening, ROM, stretching exercises with the patient and how each of these entities is important in decreasing pain.  Explained to the patient that the purpose of physical therapy is to teach the patient a home exercise  program.  These exercises need to be performed every day in order to decrease pain and prevent future occurrences of pain.  Likened it to brushing one's teeth.      3. MEDICATIONS:  Discussed multiple medication options today with patient. Discussed risks, side effects, and proper use of medications. Patient verbalized understanding.  No new medications ordered at this visit.    4. INTERVENTIONS:  Patient requires further imaging to assess what interventional options may be best for them.    5. OTHER REFERRALS:  No other referrals at this time.    6. FOLLOW-UP  To review imaging results once imaging is completed  Patient advised to contact our office for earlier appointment if symptoms worsening or not improving, or any side effects are noticed.    Advised patient to call the Spine Center if symptoms worsen or you have problems controlling bladder and bowel function.   ______________________________________________________________________    SUBJECTIVE:   Cliff Dai  is a 73 year old female who presents today for new patient evaluation.    Patient reports she has left-sided low back pain in the mid lumbar area.  Cannot identify any correlation to specific activities or specific positions.  Pain is by report constant throughout the day.  Patient denies any radicular component, no numbness or tingling of the legs, no weakness, no bladder or bowel incontinence.  Pain has been ongoing for several years per her recall.    Patient reports in the past she had an injection done at [facility] which provided some relief but wore off.  Patient sees a chiropractor regularly twice a week, and also works with a  on a daily basis for spine and general fitness.    No prior back surgeries    -Treatment to Date: As above      Oswestry (YESENIA) Questionnaire        6/28/2024    10:45 AM   OSWESTRY DISABILITY INDEX   Count 9   Sum 8   Oswestry Score (%) 17.78 %       Neck Disability Index:      6/28/2024    10:50 AM   Neck  Disability Index (  Feliciano ROBERTS. and [employer]LE[employer]. 1991. All rights reserved.; used with permission)   SECTION 1 - PAIN INTENSITY 2   SECTION 2 - PERSONAL CARE 0   SECTION 3 - LIFTING 3   SECTION 4 - READING 0   SECTION 5 - HEADACHES 3   SECTION 6 - CONCENTRATION 1   SECTION 7 - WORK 1   SECTION 8 - DRIVING 0   SECTION 9 - SLEEPING 0   SECTION 10 - RECREATION 0   Count 10   Sum 10   Raw Score: /50 10   Neck Disability Index Score: (%) 20 %              -Medications:    Current Outpatient Medications   Medication Sig Dispense Refill    acetaminophen (TYLENOL) 500 MG tablet Take 1,000 mg by mouth 2 times daily as needed for mild pain      clobetasol propionate (TEMOVATE) 0.05 % external cream 1 APPLICATION THREE TIMES A DAY AS NEEDED TOPICALLY TO BUG BITE ON CHEST FOR UP TO 3 WEEKS AT A TIME      cycloSPORINE (RESTASIS) 0.05 % ophthalmic emulsion Place 1 drop into both eyes 2 times daily       Current Facility-Administered Medications   Medication Dose Route Frequency Provider Last Rate Last Admin    respiratory syncytial virus vaccine, bivalent (ABRYSVO) injection 0.5 mL  0.5 mL Intramuscular Once Hyun Mcgovern MD           Allergies   Allergen Reactions    Cephalexin Itching    Glucose GI Disturbance     Gas    Lactose GI Disturbance     Gas    Other Environmental Allergy      PN: LW CM1: >>> NO CONTRAST ADVERSE REACTION <<<     Reaction :  PN: LW CM1: >>> NO CONTRAST ADVERSE REACTION <<<     Reaction :       Past Medical History:   Diagnosis Date    Arthritis     OSTEOARTHRITIS BOTH SHOULDERS    Osteoporosis     Peripheral neuropathy     Sjogren's syndrome (H24)     Stress incontinence         Patient Active Problem List   Diagnosis    Presence of artificial shoulder joint    Hip pain, left    Bilateral shoulder region arthritis    Cervicalgia    Edema    Excessive thirst    Female stress incontinence    Hyperglycemia    Hypokalemia    Inflammation of joint of shoulder region    Low back pain     Osteoarthritis of both shoulders    Osteoporosis    Pain in joint, shoulder region    Primary osteoarthritis of left hip    Prolapse of vaginal wall    Greater trochanteric bursitis of left hip    Sjogren's syndrome (H24)    Dermatitis    Symptomatic menopausal or female climacteric states    Urticaria    Vertigo    Vitamin D deficiency    Peripheral nerve disease    Sicca syndrome (H24)    Sciatica    Memory loss       Past Surgical History:   Procedure Laterality Date    APPENDECTOMY  2008    ARTHROPLASTY SHOULDER Left 2/1/2017    Procedure: ARTHROPLASTY SHOULDER;  Surgeon: Lui Gonzalez MD;  [facility]: [facility]    CHOLECYSTECTOMY  2008    GYN SURGERY  2012    VAG HYST, OOPHORECTOMY    GYN SURGERY  2008    ANTERIOR COLPORRHAPHY, REPAIR CYSTOCELE    HC ANTER COLPORRHAPHY,BLAD/VAGINA      Description: Anterior Colporrhaphy, Repair Of Cystocele;  Recorded: 02/25/2008;    HC REMOVAL GALLBLADDER      Description: Cholecystectomy;  Recorded: 10/28/2008;    HYSTERECTOMY  2004    OOPHORECTOMY Bilateral 2004    MD VAGINAL HYSTERECTOMY,UTERUS 250 GMS/<      Description: Vaginal Hysterectomy;  Recorded: 02/25/2012;  Comments: for hypermenorrhea/ prolapse; Dr. Octavio LYNN APPENDECTOMY      Description: Appendectomy;  Recorded: 10/28/2008;       Family History   Problem Relation Age of Onset    Breast Cancer Mother 57.00    Breast Cancer Maternal Grandmother         Unsure of age       Reviewed past medical, surgical, and family history with patient found on new patient intake packet located in EMR Media tab.     SOCIAL HX: Reviewed    ROS: Specifically negative for bowel/bladder dysfunction, balance changes, headache, dizziness, foot drop, fevers, chills, appetite changes, nausea/vomiting, unexplained weight loss. Otherwise 13 systems reviewed are negative. Please see the patient's intake questionnaire from today for details.    OBJECTIVE:  /57 (BP Location: Left arm, Patient Position: Sitting, Cuff Size:  "Adult Regular)   Pulse 69   Ht 5' 4.96\" (1.65 m)   Wt 132 lb 8 oz (60.1 kg)   LMP  (LMP Unknown)   BMI 22.08 kg/m      PHYSICAL EXAMINATION:  --CONSTITUTIONAL: Vital signs as above. No acute distress. The patient is well nourished and well groomed.  --PSYCHIATRIC: The patient is awake, alert, oriented to person, place, time and answering questions appropriately with clear speech. Appropriate mood and affect   --RESPIRATORY: Normal rhythm and effort. No abnormal accessory muscle breathing patterns noted.   --GROSS MOTOR: Easily arises from a seated position.  --LUMBAR SPINE: Inspection reveals no evidence of deformity. Range of motion is not limited in flexion, extension, lateral rotation. Mild tenderness to palpation of lumbar paraspinals, no tenderness in spinous processes.  Facet loading (Jade test) negative, seated SLR negative  --HIPS: Full range of motion bilaterally.  --LOWER EXTREMITY MOTOR TESTING:  Hip flexion left 5/5, right 5/5  Knee extension left 5/5, right 5/5  Ankle dorsiflexors left 5/5, right 5/5  Ankle plantarflexors left 5/5, right 5/5   Great toe extension left 5/5, right 5/5   Knee flexion left 5/5, right 5/5  --NEUROLOGIC: Sensation to lower extremities is intact bilaterally in L2-S1 dermatomes.  Negative Espinoza's bilaterally. Reflexes intact in BLE, no clonus.  --VASCULAR: Warm upper limbs bilaterally. Capillary refill in the upper extremities is less than 1 second.    RESULTS: Available medical records from [facility] and any other outside records were reviewed today.     Imaging:  Available relevant imaging was personally reviewed and interpreted today. The images were shown to the patient and the findings were explained using a spine model.    XR Lumbar Spine 2/3 Views    Result Date: 6/6/2024  EXAM: XR LUMBAR SPINE 2/3 VIEWS LOCATION: [facility] DATE: 6/5/2024 INDICATION: low back pain    left sided   no sciatica COMPARISON: Lumbar spine MRI: " 1/6/2016.     IMPRESSION: 1.  No radiographic evidence of acute fracture involving the lumbar spine. Vertebral body heights are maintained. 2.  Lumbar dextrocurvature with apex at L3-L4. Retrolisthesis of L3 on L4 measuring approximately 4 mm. 8 mm leftward translation of L2 on L3. 3.  Multilevel degenerative disc disease throughout the lumbar spine with disc height loss greatest and advanced on the left at L2-L3 and at L4-L5. Multilevel facet arthropathy. 4.  No focal extraspinal soft tissue abnormality is evident.

## 2024-06-28 NOTE — PATIENT INSTRUCTIONS
~Spine Center Scheduling #[phone].  ~Please call our [facility] Spine Nurse Navigation #[phone] with any questions or concerns about your treatment plan, if symptoms worsen and you would like to be seen urgently, or if you have problems controlling bladder and bowel function.  ~For any future flareups or new symptoms, recommend follow-up in clinic or contact the nurse navigator line.  ~Please note that any My Chart messages may take multiple days for a response due to the high volume of patients seen in clinic.  Anything sent Friday night or after will be answered the following week when able.     Imaging has been ordered. Radiology will call you to schedule. Please call below if you do not hear from them in the next couple of days.     [facility] Radiology Scheduling    Please call [phone] to schedule your image(s) (select option #1). There are 3 different locations near, see below.   There are imaging options for other locations as well, the imaging schedulers can help with other locations within [place].     [facility]  [address] [address]    [facility] [place]  [address], [address]   [place] [facility] [address]    [facility]  [address] [phone]

## 2024-07-03 ENCOUNTER — OFFICE VISIT (OUTPATIENT)
Dept: CARDIOLOGY | Facility: CLINIC | Age: 74
End: 2024-07-03
Attending: FAMILY MEDICINE
Payer: COMMERCIAL

## 2024-07-03 VITALS
HEART RATE: 79 BPM | WEIGHT: 133 LBS | RESPIRATION RATE: 14 BRPM | BODY MASS INDEX: 22.16 KG/M2 | SYSTOLIC BLOOD PRESSURE: 82 MMHG | DIASTOLIC BLOOD PRESSURE: 50 MMHG

## 2024-07-03 DIAGNOSIS — R60.0 BILATERAL LEG EDEMA: ICD-10-CM

## 2024-07-03 DIAGNOSIS — I25.9 CHEST PAIN DUE TO MYOCARDIAL ISCHEMIA, UNSPECIFIED ISCHEMIC CHEST PAIN TYPE: Primary | ICD-10-CM

## 2024-07-03 PROCEDURE — 99204 OFFICE O/P NEW MOD 45 MIN: CPT | Performed by: INTERNAL MEDICINE

## 2024-07-03 NOTE — LETTER
August 20, 2024      Cliff Dai  [address]  [address]  [place] [address]        Dear ,    We are writing to inform you of your test results.          I have reviewed your coronary CT angiogram report which is normal. You have no coronary artery disease. Your chest pain is noncardiac.  Echocardiogram showed normal heart function and structure.  No further cardiac evaluation. Your symptoms are noncardiac.  Dr Harden       Resulted Orders   CT Coronary Artery Angio w Calcium Score   Result Value Ref Range    Agatston Score of Left Main 0     Agatston Score of the Left Anterior Descending 0     Agatston Score of Circumflex 0     Agatston Score of Right Coronary Artery 0     Total Score 0.00     BSA 0.00 m2    Narrative      No evidence for coronary artery disease.    Calcium Scoring: The total Agatston score is 0. A calcium score of zero   places the individual in the lowest quartile when compared to an age and   gender matched control group and implies a low risk of cardiac events in   the next 10 years. (less than 1% per year).         If you have any questions or concerns, please call the clinic at the number listed above.       Sincerely,      Narayan Harden MD

## 2024-07-03 NOTE — LETTER
7/3/2024    MARIUM MCGOVERN MD  [address] [place] [id number]    RE: Cliff Dai       Dear Colleague,     I had the pleasure of seeing Cliff Dai in the [facility].      Click to link to [facility] HEART CARE NOTE    Thank you, Dr. Mcgovern, for asking me to see Cliff Dai in consultation at [facility] to evaluate chest pain.      Assessment/Plan:   Chest pain: The patient developed intermittent chest pain over the last 3 to 4 weeks.  Pressure pain, mild in severity, not typically associated with exertion.  She had ER visit to 3 times a day.  ECGs are reviewed, mild T wave abnormality appeared dynamic or change.  Hypersensitivity troponin slightly elevated.  The patient has a few risk factor for developing significant coronary artery disease.  We discussed the evaluation and management.  After discussion, coronary CT angiogram is requested for evaluation of her significant coronary artery disease.  Echocardiogram is requested for evaluation of chest pain and also bilateral leg edema.  Based on coronary CT and echo findings, and then discussed next step.  The patient and her  and daughter agreed with the plan.    Thank you for the opportunity to be involved in the care of Cliff Dai. If you have any questions, please feel free to contact me.  I will see the patient again in 2 months and as needed    Much or all of the text in this note was generated through the use of Dragon Dictate voice-to-text software. Errors in spelling or words which seem out of context are unintentional.   Sound alike errors, in particular, may have escaped editing.       History of Present Illness:   It is my pleasure to see Cliff Dai at the [facility] for evaluation of New Patient. Cliff Dai is a 73 year old female with a medical history of memory loss, family history of coronary artery  disease.    The patient presents to cardiology clinic for evaluation of chest pain.  She developed intermittent chest pain for 3 weeks.  She visited ER 3 times due to chest pain.  She described her chest pain as located anterior chest and lateral chest, pressure pain, mild in severity, radiated to left arm, not exertional.  She did not complains of shortness of breath.  She did not have palpitations, orthopnea, PND.  She has intermittent lightheadedness.  She developed mild bilateral leg edema over last several weeks.  Her blood pressure at the low side.  She states that she did not drink enough of fluid.  Her blood pressure is 86/58 mmHg in clinic today.    Past Medical History:     Patient Active Problem List   Diagnosis    Presence of artificial shoulder joint    Hip pain, left    Bilateral shoulder region arthritis    Cervicalgia    Edema    Excessive thirst    Female stress incontinence    Hyperglycemia    Hypokalemia    Inflammation of joint of shoulder region    Low back pain    Osteoarthritis of both shoulders    Osteoporosis    Pain in joint, shoulder region    Primary osteoarthritis of left hip    Prolapse of vaginal wall    Greater trochanteric bursitis of left hip    Sjogren's syndrome (H24)    Dermatitis    Symptomatic menopausal or female climacteric states    Urticaria    Vertigo    Vitamin D deficiency    Peripheral nerve disease    Sicca syndrome (H24)    Sciatica    Memory loss       Past Surgical History:     Past Surgical History:   Procedure Laterality Date    APPENDECTOMY  2008    ARTHROPLASTY SHOULDER Left 2/1/2017    Procedure: ARTHROPLASTY SHOULDER;  Surgeon: Lui Gonzalez MD;  [facility]    CHOLECYSTECTOMY  2008    GYN SURGERY  2012    VAG HYST, OOPHORECTOMY    GYN SURGERY  2008    ANTERIOR COLPORRHAPHY, REPAIR CYSTOCELE    HC ANTER COLPORRHAPHY,BLAD/VAGINA      Description: Anterior Colporrhaphy, Repair Of Cystocele;  Recorded: 02/25/2008;    HC REMOVAL GALLBLADDER       Description: Cholecystectomy;  Recorded: 10/28/2008;    HYSTERECTOMY  2004    OOPHORECTOMY Bilateral 2004    MD VAGINAL HYSTERECTOMY,UTERUS 250 GMS/<      Description: Vaginal Hysterectomy;  Recorded: 02/25/2012;  Comments: for hypermenorrhea/ prolapse; Dr. Octavio DAVIS APPENDECTOMY      Description: Appendectomy;  Recorded: 10/28/2008;       Family History:     Family History   Problem Relation Age of Onset    Breast Cancer Mother 57.00    Breast Cancer Maternal Grandmother         Unsure of age       Social History:    reports that she quit smoking about 44 years ago. Her smoking use included cigarettes. She has never used smokeless tobacco. She reports current alcohol use. She reports that she does not use drugs.    Review of Systems:   12 systems are reviewed negative except for in HPI.    Meds:     Current Outpatient Medications:     acetaminophen (TYLENOL) 500 MG tablet, Take 1,000 mg by mouth 2 times daily as needed for mild pain, Disp: , Rfl:     cycloSPORINE (RESTASIS) 0.05 % ophthalmic emulsion, Place 1 drop into both eyes 2 times daily, Disp: , Rfl:     clobetasol propionate (TEMOVATE) 0.05 % external cream, 1 APPLICATION THREE TIMES A DAY AS NEEDED TOPICALLY TO BUG BITE ON CHEST FOR UP TO 3 WEEKS AT A TIME, Disp: , Rfl:     Current Facility-Administered Medications:     respiratory syncytial virus vaccine, bivalent (ABRYSVO) injection 0.5 mL, 0.5 mL, Intramuscular, Once, Hyun Mcgovern MD     Allergies:   Cephalexin, Glucose, Lactose, and Other environmental allergy    Objective:      Physical Exam  60.3 kg (133 lb)     Body mass index is 22.16 kg/m .  BP (!) 82/50 (BP Location: Right arm, Patient Position: Sitting, Cuff Size: Adult Regular)   Pulse 79   Resp 14   Wt 60.3 kg (133 lb)   LMP  (LMP Unknown)   BMI 22.16 kg/m      General Appearance:   Awake, Alert, No acute distress.   HEENT:  Pupil equal, reactive to light. No scleral icterus; the mucous membranes were moist. No oral  ulcers or thrush.    Neck: No cervical bruits. No JVD. No thyromegaly. No lymph node enlargement or tenderness.   Chest: The spine was straight. The chest was symmetric.   Lungs:   Respirations unlabored. Lungs are clear to auscultation. No crackles. No wheezing.   Cardiovascular:   RRR, normal first and second heart sounds with no murmurs. No rubs or gallops.    Abdomen:  Soft. No tenderness. Non-distended. Bowels sounds are present   Extremities: Equal posterior tibial pulses. No leg edema.   Skin: No rashes or ulcers. Warm, Dry.   Musculoskeletal: No tenderness. No deformity.   Neurologic: Mood and affect are appropriate. No focal deficits.         EKG:  Personally reivewed  Normal sinus rhythm  Dynamic nonspecific T wave abnormality    Cardiac Imaging Studies  Echo in March 2021:  1. Normal left ventricular size and systolic performance with a visually estimated ejection fraction of 65%.   2. No significant valvular heart disease is identified on this study.   3. Probable normal right ventricular size and systolic performance though right-sided structures are not clearly visualized on all views on this study.     Lab Review   Lab Results   Component Value Date     07/10/2023     02/04/2017    CO2 25 07/10/2023    CO2 24 04/12/2022    CO2 29 02/04/2017    BUN 16.4 07/10/2023    BUN 19 04/12/2022    BUN 4 02/04/2017     Lab Results   Component Value Date    WBC 5.5 07/10/2023    HGB 12.6 07/10/2023    HGB 9.7 02/03/2017    HCT 38.5 07/10/2023     07/10/2023     07/10/2023     Lab Results   Component Value Date    CHOL 169 11/23/2022    TRIG 84 11/23/2022    HDL 63 11/23/2022    LDL 89 11/23/2022     LDL Cholesterol Calculated   Date Value Ref Range Status   11/23/2022 89 <=100 mg/dL Final     Lab Results   Component Value Date    TROPONINI <0.01 03/02/2021     Lab Results   Component Value Date    TSH 2.31 03/15/2023    TSH 1.69 03/02/2021                   Thank you for allowing me to  participate in the care of your patient.      Sincerely,     Narayan Harden MD     [facility]  cc:   Hyun Mcgovern MD  [address] [place] [id number]

## 2024-07-03 NOTE — PROGRESS NOTES
Click to link to [facility] HEART CARE NOTE    Thank you, Dr. Mcgovern, for asking me to see Cliff Dai in consultation at [facility] to evaluate chest pain.      Assessment/Plan:   Chest pain: The patient developed intermittent chest pain over the last 3 to 4 weeks.  Pressure pain, mild in severity, not typically associated with exertion.  She had ER visit to 3 times a day.  ECGs are reviewed, mild T wave abnormality appeared dynamic or change.  Hypersensitivity troponin slightly elevated.  The patient has a few risk factor for developing significant coronary artery disease.  We discussed the evaluation and management.  After discussion, coronary CT angiogram is requested for evaluation of her significant coronary artery disease.  Echocardiogram is requested for evaluation of chest pain and also bilateral leg edema.  Based on coronary CT and echo findings, and then discussed next step.  The patient and her  and daughter agreed with the plan.    Thank you for the opportunity to be involved in the care of Cliff Dai. If you have any questions, please feel free to contact me.  I will see the patient again in 2 months and as needed    Much or all of the text in this note was generated through the use of Dragon Dictate voice-to-text software. Errors in spelling or words which seem out of context are unintentional.   Sound alike errors, in particular, may have escaped editing.       History of Present Illness:   It is my pleasure to see Cliff Dai at the [facility] for evaluation of New Patient. Cliff Dai is a 73 year old female with a medical history of memory loss, family history of coronary artery disease.    The patient presents to cardiology clinic for evaluation of chest pain.  She developed intermittent chest pain for 3 weeks.  She visited ER 3 times due to chest pain.  She described her chest pain as  located anterior chest and lateral chest, pressure pain, mild in severity, radiated to left arm, not exertional.  She did not complains of shortness of breath.  She did not have palpitations, orthopnea, PND.  She has intermittent lightheadedness.  She developed mild bilateral leg edema over last several weeks.  Her blood pressure at the low side.  She states that she did not drink enough of fluid.  Her blood pressure is 86/58 mmHg in clinic today.    Past Medical History:     Patient Active Problem List   Diagnosis    Presence of artificial shoulder joint    Hip pain, left    Bilateral shoulder region arthritis    Cervicalgia    Edema    Excessive thirst    Female stress incontinence    Hyperglycemia    Hypokalemia    Inflammation of joint of shoulder region    Low back pain    Osteoarthritis of both shoulders    Osteoporosis    Pain in joint, shoulder region    Primary osteoarthritis of left hip    Prolapse of vaginal wall    Greater trochanteric bursitis of left hip    Sjogren's syndrome (H24)    Dermatitis    Symptomatic menopausal or female climacteric states    Urticaria    Vertigo    Vitamin D deficiency    Peripheral nerve disease    Sicca syndrome (H24)    Sciatica    Memory loss       Past Surgical History:     Past Surgical History:   Procedure Laterality Date    APPENDECTOMY  2008    ARTHROPLASTY SHOULDER Left 2/1/2017    Procedure: ARTHROPLASTY SHOULDER;  Surgeon: Lui Gonzalez MD;  Location: [facility] OR    CHOLECYSTECTOMY  2008    GYN SURGERY  2012    VAG HYST, OOPHORECTOMY    GYN SURGERY  2008    ANTERIOR COLPORRHAPHY, REPAIR CYSTOCELE    HC ANTER COLPORRHAPHY,BLAD/VAGINA      Description: Anterior Colporrhaphy, Repair Of Cystocele;  Recorded: 02/25/2008;    HC REMOVAL GALLBLADDER      Description: Cholecystectomy;  Recorded: 10/28/2008;    HYSTERECTOMY  2004    OOPHORECTOMY Bilateral 2004    CO VAGINAL HYSTERECTOMY,UTERUS 250 GMS/<      Description: Vaginal Hysterectomy;  Recorded: 02/25/2012;   Comments: for hypermenorrhea/ prolapse; Dr. Octavio LYNN APPENDECTOMY      Description: Appendectomy;  Recorded: 10/28/2008;       Family History:     Family History   Problem Relation Age of Onset    Breast Cancer Mother 57.00    Breast Cancer Maternal Grandmother         Unsure of age       Social History:    reports that she quit smoking about 44 years ago. Her smoking use included cigarettes. She has never used smokeless tobacco. She reports current alcohol use. She reports that she does not use drugs.    Review of Systems:   12 systems are reviewed negative except for in HPI.    Meds:     Current Outpatient Medications:     acetaminophen (TYLENOL) 500 MG tablet, Take 1,000 mg by mouth 2 times daily as needed for mild pain, Disp: , Rfl:     cycloSPORINE (RESTASIS) 0.05 % ophthalmic emulsion, Place 1 drop into both eyes 2 times daily, Disp: , Rfl:     clobetasol propionate (TEMOVATE) 0.05 % external cream, 1 APPLICATION THREE TIMES A DAY AS NEEDED TOPICALLY TO BUG BITE ON CHEST FOR UP TO 3 WEEKS AT A TIME, Disp: , Rfl:     Current Facility-Administered Medications:     respiratory syncytial virus vaccine, bivalent (ABRYSVO) injection 0.5 mL, 0.5 mL, Intramuscular, Once, Hyun Mcgovern MD     Allergies:   Cephalexin, Glucose, Lactose, and Other environmental allergy    Objective:      Physical Exam  60.3 kg (133 lb)     Body mass index is 22.16 kg/m .  BP (!) 82/50 (BP Location: Right arm, Patient Position: Sitting, Cuff Size: Adult Regular)   Pulse 79   Resp 14   Wt 60.3 kg (133 lb)   LMP  (LMP Unknown)   BMI 22.16 kg/m      General Appearance:   Awake, Alert, No acute distress.   HEENT:  Pupil equal, reactive to light. No scleral icterus; the mucous membranes were moist. No oral ulcers or thrush.    Neck: No cervical bruits. No JVD. No thyromegaly. No lymph node enlargement or tenderness.   Chest: The spine was straight. The chest was symmetric.   Lungs:   Respirations unlabored. Lungs are  clear to auscultation. No crackles. No wheezing.   Cardiovascular:   RRR, normal first and second heart sounds with no murmurs. No rubs or gallops.    Abdomen:  Soft. No tenderness. Non-distended. Bowels sounds are present   Extremities: Equal posterior tibial pulses. No leg edema.   Skin: No rashes or ulcers. Warm, Dry.   Musculoskeletal: No tenderness. No deformity.   Neurologic: Mood and affect are appropriate. No focal deficits.         EKG:  Personally reivewed  Normal sinus rhythm  Dynamic nonspecific T wave abnormality    Cardiac Imaging Studies  Echo in March 2021:  1. Normal left ventricular size and systolic performance with a visually estimated ejection fraction of 65%.   2. No significant valvular heart disease is identified on this study.   3. Probable normal right ventricular size and systolic performance though right-sided structures are not clearly visualized on all views on this study.     Lab Review   Lab Results   Component Value Date     07/10/2023     02/04/2017    CO2 25 07/10/2023    CO2 24 04/12/2022    CO2 29 02/04/2017    BUN 16.4 07/10/2023    BUN 19 04/12/2022    BUN 4 02/04/2017     Lab Results   Component Value Date    WBC 5.5 07/10/2023    HGB 12.6 07/10/2023    HGB 9.7 02/03/2017    HCT 38.5 07/10/2023     07/10/2023     07/10/2023     Lab Results   Component Value Date    CHOL 169 11/23/2022    TRIG 84 11/23/2022    HDL 63 11/23/2022    LDL 89 11/23/2022     LDL Cholesterol Calculated   Date Value Ref Range Status   11/23/2022 89 <=100 mg/dL Final     Lab Results   Component Value Date    TROPONINI <0.01 03/02/2021     Lab Results   Component Value Date    TSH 2.31 03/15/2023    TSH 1.69 03/02/2021

## 2024-07-08 ENCOUNTER — PATIENT OUTREACH (OUTPATIENT)
Dept: CARE COORDINATION | Facility: CLINIC | Age: 74
End: 2024-07-08
Payer: COMMERCIAL

## 2024-07-11 SDOH — HEALTH STABILITY: PHYSICAL HEALTH: ON AVERAGE, HOW MANY MINUTES DO YOU ENGAGE IN EXERCISE AT THIS LEVEL?: 60 MIN

## 2024-07-11 SDOH — HEALTH STABILITY: PHYSICAL HEALTH: ON AVERAGE, HOW MANY DAYS PER WEEK DO YOU ENGAGE IN MODERATE TO STRENUOUS EXERCISE (LIKE A BRISK WALK)?: 5 DAYS

## 2024-07-11 ASSESSMENT — SOCIAL DETERMINANTS OF HEALTH (SDOH): HOW OFTEN DO YOU GET TOGETHER WITH FRIENDS OR RELATIVES?: THREE TIMES A WEEK

## 2024-07-16 ENCOUNTER — OFFICE VISIT (OUTPATIENT)
Dept: FAMILY MEDICINE | Facility: CLINIC | Age: 74
End: 2024-07-16
Attending: FAMILY MEDICINE
Payer: COMMERCIAL

## 2024-07-16 ENCOUNTER — MYC MEDICAL ADVICE (OUTPATIENT)
Dept: FAMILY MEDICINE | Facility: CLINIC | Age: 74
End: 2024-07-16

## 2024-07-16 VITALS
HEART RATE: 88 BPM | WEIGHT: 130 LBS | BODY MASS INDEX: 23.04 KG/M2 | HEIGHT: 63 IN | DIASTOLIC BLOOD PRESSURE: 73 MMHG | RESPIRATION RATE: 20 BRPM | OXYGEN SATURATION: 99 % | SYSTOLIC BLOOD PRESSURE: 117 MMHG | TEMPERATURE: 97.9 F

## 2024-07-16 DIAGNOSIS — M35.00 SICCA SYNDROME (H): ICD-10-CM

## 2024-07-16 DIAGNOSIS — R26.89 ABNORMALITY OF GAIT DUE TO IMPAIRMENT OF BALANCE: ICD-10-CM

## 2024-07-16 DIAGNOSIS — R07.89 ATYPICAL CHEST PAIN: ICD-10-CM

## 2024-07-16 DIAGNOSIS — R41.3 MEMORY LOSS: ICD-10-CM

## 2024-07-16 DIAGNOSIS — Z00.00 ENCOUNTER FOR MEDICARE ANNUAL WELLNESS EXAM: Primary | ICD-10-CM

## 2024-07-16 DIAGNOSIS — E78.2 MIXED HYPERLIPIDEMIA: ICD-10-CM

## 2024-07-16 PROCEDURE — G0439 PPPS, SUBSEQ VISIT: HCPCS | Performed by: FAMILY MEDICINE

## 2024-07-16 PROCEDURE — 99213 OFFICE O/P EST LOW 20 MIN: CPT | Mod: 25 | Performed by: FAMILY MEDICINE

## 2024-07-16 NOTE — PROGRESS NOTES
"Preventive Care Visit  [facility]  MARIUM KRUEGER MD, Family Medicine  Jul 16, 2024      1. Encounter for Medicare annual wellness exam  This is a 72 yo female here for AWV   - PRIMARY CARE FOLLOW-UP SCHEDULING    2. Sicca syndrome (H24)  Patient has sicca syndrome - manages with eye drops - otherwise stable     3. Abnormality of gait due to impairment of balance  Patient has poor balance - her consultants have told her she should check in with ENT - will offer referral.    - Adult ENT  Referral; Future    4. Mixed hyperlipidemia  Elevated lipids - would encourage good lipid control -     5. Memory loss  Patient continues to demonstrate mild memory loss although she manages fairly well - has had neurological workup - would encourage her to continue to follow with them      6. Atypical chest pain  Patient has seen cardiology for atypical chest pain - no clear evidence for cardiac ischemic disease.        Ethan Coronado is a 73 year old, presenting for the following:  Wellness Visit        7/16/2024     8:45 AM   Additional Questions   Roomed by [facility] Care Directive  Patient does not have a Health Care Directive or Living Will: Patient states has Advance Directive and will bring in a copy to clinic.    Had ER visit for chest pain -   Went back to [place] ER for hyperventilating   Seen by cardiology   Googled stuff - and worried about dying in first 5  minutes  Will be doing scan on August 12th (cardiac)    Saw spine doctor - back xray - deteriorated -   Has follow up after MRI on 7/23 -   Back:  more sensitive -     Still working out with her    Chiropractor is \"really good\" - told her that she had \"kink\" in her back in lower back -   Imbalance is due to BPPV - BP changes ??  Patient has written down paroxysmal as a diagnosis (discussed this is a descriptor, not a diagnosis)  Chiro thinks her balance should be evaluated by ENT - patient " "doesn't want to say \"dizzy\", but has to walk to keep her balance       Only medication is Tylenol - at least once a day, takes 2x500 mg Acetaminophen and an allergy pill - Cetirizine?  Has neck/back pain - works with biomechanical massage person - does a lot for her neck    Works with  - at cardiac rehab at [facility] - but couldn't stand how bad the other people were  He has developed a workout for her     [facility] -           7/11/2024   General Health   How would you rate your overall physical health? Good   Feel stress (tense, anxious, or unable to sleep) Not at all            7/11/2024   Nutrition   Diet: Gluten-free/reduced            7/11/2024   Exercise   Days per week of moderate/strenous exercise 5 days   Average minutes spent exercising at this level 60 min            7/11/2024   Social Factors   Frequency of gathering with friends or relatives Three times a week   Worry food won't last until get money to buy more No   Food not last or not have enough money for food? No   Do you have housing? (Housing is defined as stable permanent housing and does not include staying ouside in a car, in a tent, in an abandoned building, in an overnight shelter, or couch-surfing.) Yes   Are you worried about losing your housing? No   Lack of transportation? No   Unable to get utilities (heat,electricity)? No            7/16/2024   Fall Risk   Gait Speed Test (Document in seconds) 4   Gait Speed Test Interpretation Less than or equal to 5.00 seconds - PASS             7/11/2024   Activities of Daily Living- Home Safety   Needs help with the following daily activites None of the above   Safety concerns in the home None of the above            7/11/2024   Dental   Dentist two times every year? Yes            7/11/2024   Hearing Screening   Hearing concerns? None of the above            7/11/2024   Driving Risk Screening   Patient/family members have concerns about driving No            7/11/2024   General " Alertness/Fatigue Screening   Have you been more tired than usual lately? (!) YES            2024   Urinary Incontinence Screening   Bothered by leaking urine in past 6 months No            2024   TB Screening   Were you born outside of the [place]? No            Today's PHQ-2 Score:       2024     8:41 AM   PHQ-2 (  [employer])   Q1: Little interest or pleasure in doing things 0   Q2: Feeling down, depressed or hopeless 0   PHQ-2 Score 0   Q1: Little interest or pleasure in doing things Not at all   Q2: Feeling down, depressed or hopeless Not at all   PHQ-2 Score 0           2024   Substance Use   Alcohol more than 3/day or more than 7/wk No   Do you have a current opioid prescription? No   How severe/bad is pain from 1 to 10? 1/10   Do you use any other substances recreationally? No        Social History     Tobacco Use    Smoking status: Former     Current packs/day: 0.00     Types: Cigarettes     Quit date: 1980     Years since quittin.5    Smokeless tobacco: Never   Vaping Use    Vaping status: Never Used   Substance Use Topics    Alcohol use: Yes     Comment: 2-3 GLASSES OF WINE 3 TIMES/WEEK    Drug use: Never           2023   LAST FHS-7 RESULTS   1st degree relative breast or ovarian cancer Yes   Any relative bilateral breast cancer No   Any male have breast cancer No   Any ONE woman have BOTH breast AND ovarian cancer No   Any woman with breast cancer before 50yrs No   2 or more relatives with breast AND/OR ovarian cancer Yes   2 or more relatives with breast AND/OR bowel cancer Yes           Mammogram Screening - After age 74- determine frequency with patient based on health status, life expectancy and patient goals    ASCVD Risk   The 10-year ASCVD risk score (Bill AYALA, et al., 2019) is: 13.5%    Values used to calculate the score:      Age: 73 years      Sex: Female      Is Non- : No      Diabetic: No      Tobacco smoker: No      Systolic Blood  Pressure: 117 mmHg      Is BP treated: No      HDL Cholesterol: 63 mg/dL      Total Cholesterol: 169 mg/dL            Reviewed and updated as needed this visit by Provider                    Past Medical History:   Diagnosis Date    Arthritis     OSTEOARTHRITIS BOTH SHOULDERS    Osteoporosis     Peripheral neuropathy     Sjogren's syndrome (H24)     Stress incontinence      Past Surgical History:   Procedure Laterality Date    APPENDECTOMY  2008    ARTHROPLASTY SHOULDER Left 2017    Procedure: ARTHROPLASTY SHOULDER;  Surgeon: Lui Gonzalez MD;  Location: [facility] OR    CHOLECYSTECTOMY      GYN SURGERY      VAG HYST, OOPHORECTOMY    GYN SURGERY      ANTERIOR COLPORRHAPHY, REPAIR CYSTOCELE    HC ANTER COLPORRHAPHY,BLAD/VAGINA      Description: Anterior Colporrhaphy, Repair Of Cystocele;  Recorded: 2008;    HC REMOVAL GALLBLADDER      Description: Cholecystectomy;  Recorded: 10/28/2008;    HYSTERECTOMY  2004    OOPHORECTOMY Bilateral 2004    NM VAGINAL HYSTERECTOMY,UTERUS 250 GMS/<      Description: Vaginal Hysterectomy;  Recorded: 2012;  Comments: for hypermenorrhea/ prolapse; Dr. Octavio LYNN APPENDECTOMY      Description: Appendectomy;  Recorded: 10/28/2008;     OB History    Para Term  AB Living   2 2 2 0 0 0   SAB IAB Ectopic Multiple Live Births   0 0 0 0 0      # Outcome Date GA Lbr Jean/2nd Weight Sex Type Anes PTL Lv   2 Term            1 Term              BP Readings from Last 3 Encounters:   24 117/73   24 (!) 82/50   24 115/57    Wt Readings from Last 3 Encounters:   24 59 kg (130 lb)   24 60.3 kg (133 lb)   24 60.1 kg (132 lb 8 oz)                  Patient Active Problem List   Diagnosis    Presence of artificial shoulder joint    Hip pain, left    Bilateral shoulder region arthritis    Cervicalgia    Edema    Excessive thirst    Female stress incontinence    Hyperglycemia    Hypokalemia    Inflammation of joint of shoulder  region    Low back pain    Osteoarthritis of both shoulders    Osteoporosis    Pain in joint, shoulder region    Primary osteoarthritis of left hip    Prolapse of vaginal wall    Greater trochanteric bursitis of left hip    Sjogren's syndrome (H24)    Dermatitis    Symptomatic menopausal or female climacteric states    Urticaria    Vertigo    Vitamin D deficiency    Peripheral nerve disease    Sicca syndrome (H24)    Sciatica    Memory loss     Past Surgical History:   Procedure Laterality Date    APPENDECTOMY  2008    ARTHROPLASTY SHOULDER Left 2017    Procedure: ARTHROPLASTY SHOULDER;  Surgeon: Lui Gonzalez MD;  Location: [facility]    CHOLECYSTECTOMY      GYN SURGERY      VAG HYST, OOPHORECTOMY    GYN SURGERY      ANTERIOR COLPORRHAPHY, REPAIR CYSTOCELE    HC ANTER COLPORRHAPHY,BLAD/VAGINA      Description: Anterior Colporrhaphy, Repair Of Cystocele;  Recorded: 2008;    HC REMOVAL GALLBLADDER      Description: Cholecystectomy;  Recorded: 10/28/2008;    HYSTERECTOMY  2004    OOPHORECTOMY Bilateral 2004    OK VAGINAL HYSTERECTOMY,UTERUS 250 GMS/<      Description: Vaginal Hysterectomy;  Recorded: 2012;  Comments: for hypermenorrhea/ prolapse; Dr. Octavio LYNN APPENDECTOMY      Description: Appendectomy;  Recorded: 10/28/2008;       Social History     Tobacco Use    Smoking status: Former     Current packs/day: 0.00     Types: Cigarettes     Quit date: 1980     Years since quittin.5    Smokeless tobacco: Never   Substance Use Topics    Alcohol use: Yes     Comment: 2-3 GLASSES OF WINE 3 TIMES/WEEK     Family History   Problem Relation Age of Onset    Breast Cancer Mother 57.00    Breast Cancer Maternal Grandmother         Unsure of age         Current Outpatient Medications   Medication Sig Dispense Refill    acetaminophen (TYLENOL) 500 MG tablet Take 1,000 mg by mouth 2 times daily as needed for mild pain      cycloSPORINE (RESTASIS) 0.05 % ophthalmic emulsion Place 1  drop into both eyes 2 times daily       Allergies   Allergen Reactions    Cephalexin Itching    Glucose GI Disturbance     Gas    Lactose GI Disturbance     Gas    Other Environmental Allergy      PN: LW CM1: >>> NO CONTRAST ADVERSE REACTION <<<     Reaction :  PN: LW CM1: >>> NO CONTRAST ADVERSE REACTION <<<     Reaction :     Recent Labs   Lab Test 07/10/23  0952 03/15/23  1606 11/23/22  0845 04/12/22  1604 03/04/21  0553 03/04/21  0553 03/03/21  0604 03/02/21  1337 03/02/21  1313 01/27/21  0908 07/17/18  0828 08/09/17  0848   A1C  --   --   --  5.5  --   --   --   --   --   --   --  5.8   LDL  --   --  89 120  --   --   --   --   --  128   < > 136*   HDL  --   --  63 52  --   --   --   --   --  49*   < > 51   TRIG  --   --  84 152*  --   --   --   --   --  71   < > 88   ALT  --  16  --  18  --   --  15  --   --  19   < >  --    CR 0.54 0.55  --  0.63  --  0.55* 0.63   < > 0.64 0.68   < >  --    GFRESTIMATED >90 >90  --  >90   < > >60 >60   < > >60 >60   < >  --    GFRESTBLACK  --   --   --   --   --  >60 >60   < > >60 >60   < >  --    POTASSIUM 4.3 4.6  --  4.4  --  3.8  3.8 4.6   < > 3.4* 4.8   < >  --    TSH  --  2.31  --   --   --   --   --   --  1.69  --    < >  --     < > = values in this interval not displayed.      Current providers sharing in care for this patient include:  Patient Care Team:  Hyun Mcgovern MD as PCP - General (Family Practice)  Lui Gonzalez MD as MD (Orthopedics)  Hyun Mcgovern MD as Assigned PCP  Poly Winston MD as MD (Neurology)  Poly Winston MD as Assigned Neuroscience Provider  Scottie Gregg, PhD as Assigned Behavioral Health Provider  Narayan Harden MD as MD (Cardiovascular Disease)    The following health maintenance items are reviewed in Epic and correct as of today:  Health Maintenance   Topic Date Due    IPV IMMUNIZATION (2 of 3 - Adult catch-up series) 02/05/1997    DTAP/TDAP/TD IMMUNIZATION (3 - Td or Tdap) 11/18/2023     "MEDICARE ANNUAL WELLNESS VISIT  07/10/2024    INFLUENZA VACCINE (1) 09/01/2024    COLORECTAL CANCER SCREENING  05/22/2025    ANNUAL REVIEW OF HM ORDERS  06/05/2025    FALL RISK ASSESSMENT  07/16/2025    GLUCOSE  07/10/2026    LIPID  11/23/2027    ADVANCE CARE PLANNING  07/16/2029    DEXA  09/22/2038    HEPATITIS C SCREENING  Completed    PHQ-2 (once per calendar year)  Completed    Pneumococcal Vaccine: 65+ Years  Completed    ZOSTER IMMUNIZATION  Completed    RSV VACCINE (Pregnancy & 60+)  Completed    COVID-19 Vaccine  Completed    HPV IMMUNIZATION  Aged Out    MENINGITIS IMMUNIZATION  Aged Out    RSV MONOCLONAL ANTIBODY  Aged Out    MAMMO SCREENING  Discontinued       Review of Systems   Constitutional:  Negative for chills and fever.   HENT:  Negative for ear pain and sore throat.    Eyes:  Negative for pain and visual disturbance.   Respiratory:  Negative for cough and shortness of breath.    Cardiovascular:  Negative for chest pain and palpitations.   Gastrointestinal:  Negative for abdominal pain and vomiting.   Genitourinary:  Negative for dysuria and hematuria.   Musculoskeletal:  Negative for arthralgias and back pain.   Skin:  Negative for color change and rash.   Neurological:  Positive for dizziness. Negative for seizures and syncope.        Poor balance  Mild cognitive challenges     All other systems reviewed and are negative.         Objective    Exam  /73 (BP Location: Right arm, Patient Position: Sitting, Cuff Size: Adult Regular)   Pulse 88   Temp 97.9  F (36.6  C) (Oral)   Resp 20   Ht 1.61 m (5' 3.39\")   Wt 59 kg (130 lb)   LMP  (LMP Unknown)   SpO2 99%   BMI 22.75 kg/m     Estimated body mass index is 22.75 kg/m  as calculated from the following:    Height as of this encounter: 1.61 m (5' 3.39\").    Weight as of this encounter: 59 kg (130 lb).    Physical Exam  Vitals reviewed.   Constitutional:       General: She is not in acute distress.     Appearance: Normal appearance. "   HENT:      Head: Normocephalic.      Right Ear: Tympanic membrane, ear canal and external ear normal.      Left Ear: Tympanic membrane, ear canal and external ear normal.      Nose: Nose normal.      Mouth/Throat:      Mouth: Mucous membranes are moist.      Pharynx: No posterior oropharyngeal erythema.   Eyes:      Extraocular Movements: Extraocular movements intact.      Conjunctiva/sclera: Conjunctivae normal.      Pupils: Pupils are equal, round, and reactive to light.   Cardiovascular:      Rate and Rhythm: Normal rate and regular rhythm.      Pulses: Normal pulses.      Heart sounds: Normal heart sounds. No murmur heard.  Pulmonary:      Effort: Pulmonary effort is normal.      Breath sounds: Normal breath sounds.   Abdominal:      Palpations: Abdomen is soft. There is no mass.      Tenderness: There is no abdominal tenderness. There is no guarding or rebound.   Musculoskeletal:         General: No deformity. Normal range of motion.      Cervical back: Normal range of motion and neck supple.   Lymphadenopathy:      Cervical: No cervical adenopathy.   Skin:     General: Skin is warm and dry.   Neurological:      General: No focal deficit present.      Mental Status: She is alert.      Comments: Mild cognitive deficit   Psychiatric:         Mood and Affect: Mood normal.         Behavior: Behavior normal.       No results found for any visits on 07/16/24.           7/16/2024   Mini Cog   Clock Draw Score 2 Normal   3 Item Recall 3 objects recalled   Mini Cog Total Score 5                 Signed Electronically by: MARIUM KRUEGER MD      Prior to immunization administration, verified patients identity using patient s name and date of birth. Please see Immunization Activity for additional information.     Screening Questionnaire for Adult Immunization    Are you sick today?   No   Do you have allergies to medications, food, a vaccine component or latex?   Yes   Have you ever had a serious reaction  after receiving a vaccination?   No   Do you have a long-term health problem with heart, lung, kidney, or metabolic disease (e.g., diabetes), asthma, a blood disorder, no spleen, complement component deficiency, a cochlear implant, or a spinal fluid leak?  Are you on long-term aspirin therapy?   No   Do you have cancer, leukemia, HIV/AIDS, or any other immune system problem?   No   Do you have a parent, brother, or sister with an immune system problem?   No   In the past 3 months, have you taken medications that affect  your immune system, such as prednisone, other steroids, or anticancer drugs; drugs for the treatment of rheumatoid arthritis, Crohn s disease, or psoriasis; or have you had radiation treatments?   No   Have you had a seizure, or a brain or other nervous system problem?   No   During the past year, have you received a transfusion of blood or blood    products, or been given immune (gamma) globulin or antiviral drug?   No   For women: Are you pregnant or is there a chance you could become       pregnant during the next month?   No   Have you received any vaccinations in the past 4 weeks?   No     Immunization questionnaire was positive for at least one answer.  Notified Dr. Infante.      Patient instructed to remain in clinic for 15 minutes afterwards, and to report any adverse reactions.     Screening performed by Rosalind Pleitez CMA on 7/16/2024 at 8:47 AM.

## 2024-07-21 ASSESSMENT — ENCOUNTER SYMPTOMS
SORE THROAT: 0
VOMITING: 0
HEMATURIA: 0
DIZZINESS: 1
PALPITATIONS: 0
FEVER: 0
COUGH: 0
ABDOMINAL PAIN: 0
ARTHRALGIAS: 0
SEIZURES: 0
CHILLS: 0
COLOR CHANGE: 0
DYSURIA: 0
BACK PAIN: 0
EYE PAIN: 0
SHORTNESS OF BREATH: 0

## 2024-07-23 ENCOUNTER — TELEPHONE (OUTPATIENT)
Dept: FAMILY MEDICINE | Facility: CLINIC | Age: 74
End: 2024-07-23

## 2024-07-23 ENCOUNTER — HOSPITAL ENCOUNTER (OUTPATIENT)
Dept: MRI IMAGING | Facility: HOSPITAL | Age: 74
Discharge: HOME | End: 2024-07-23
Attending: STUDENT IN AN ORGANIZED HEALTH CARE EDUCATION/TRAINING PROGRAM | Admitting: STUDENT IN AN ORGANIZED HEALTH CARE EDUCATION/TRAINING PROGRAM
Payer: COMMERCIAL

## 2024-07-23 ENCOUNTER — TELEPHONE (OUTPATIENT)
Dept: PHYSICAL MEDICINE AND REHAB | Facility: CLINIC | Age: 74
End: 2024-07-23

## 2024-07-23 DIAGNOSIS — M51.369 LUMBAR DEGENERATIVE DISC DISEASE: ICD-10-CM

## 2024-07-23 DIAGNOSIS — M47.816 LUMBAR SPONDYLOSIS: ICD-10-CM

## 2024-07-23 PROCEDURE — 72148 MRI LUMBAR SPINE W/O DYE: CPT

## 2024-07-23 NOTE — TELEPHONE ENCOUNTER
----- Message from Jadon Chavez sent at 7/23/2024  1:50 PM CDT -----  Multi-level degenerative/arthritis changes, which have progressed since last MRI in 2016. No severe stenosis, fracture, or emergent findings noted. Follow up in more detail at next clinic visit.

## 2024-07-23 NOTE — TELEPHONE ENCOUNTER
Spoke to pt to follow-up on possible hernia rt pelvic/groin area. Pt verb this has resolved. Pt no longer have pain and has no other concerns. No action required.     Regina GILLETTE RN  [facility]

## 2024-08-05 ENCOUNTER — TRANSFERRED RECORDS (OUTPATIENT)
Dept: HEALTH INFORMATION MANAGEMENT | Facility: CLINIC | Age: 74
End: 2024-08-05
Payer: COMMERCIAL

## 2024-08-05 RX ORDER — METOPROLOL TARTRATE 1 MG/ML
5-15 INJECTION, SOLUTION INTRAVENOUS
Status: DISCONTINUED | OUTPATIENT
Start: 2024-08-05 | End: 2024-08-16 | Stop reason: HOSPADM

## 2024-08-05 RX ORDER — NITROGLYCERIN 0.4 MG/1
0.4 TABLET SUBLINGUAL
Status: DISCONTINUED | OUTPATIENT
Start: 2024-08-05 | End: 2024-08-16 | Stop reason: HOSPADM

## 2024-08-09 ENCOUNTER — OFFICE VISIT (OUTPATIENT)
Dept: PHYSICAL MEDICINE AND REHAB | Facility: CLINIC | Age: 74
End: 2024-08-09
Payer: COMMERCIAL

## 2024-08-09 VITALS — HEART RATE: 81 BPM | DIASTOLIC BLOOD PRESSURE: 60 MMHG | SYSTOLIC BLOOD PRESSURE: 107 MMHG

## 2024-08-09 DIAGNOSIS — M54.16 LUMBAR RADICULOPATHY: Primary | ICD-10-CM

## 2024-08-09 DIAGNOSIS — G62.9 PERIPHERAL POLYNEUROPATHY: ICD-10-CM

## 2024-08-09 PROCEDURE — 99213 OFFICE O/P EST LOW 20 MIN: CPT | Performed by: STUDENT IN AN ORGANIZED HEALTH CARE EDUCATION/TRAINING PROGRAM

## 2024-08-09 ASSESSMENT — PAIN SCALES - GENERAL: PAINLEVEL: NO PAIN (1)

## 2024-08-09 NOTE — LETTER
8/9/2024      Cliff Dai  [address]  [address]  [place] [address]      Dear Colleague,    Thank you for referring your patient, Cliff Dai, to the [facility] SPINE [facility]UROSURGERY. Please see a copy of my visit note below.    ASSESSMENT:  Cliff Dai is a 74-year-old female presents for follow-up of :         Diagnoses and all orders for this visit:  Lumbar radiculopathy  -     EMG; Future  Peripheral polyneuropathy  -     EMG; Future       Patient is neurologically intact on exam. No myelopathic or red flag symptoms.    Patient presents for follow-up and imaging review related to her left-sided low back pain, and she also notes tingling and paresthesias in a symmetric distribution below the knees bilaterally.  We discussed the possibility of lower lumbosacral radiculopathy versus peripheral neuropathy, which I suspect is likely given the symmetric nondermatomal nature of the symptom distribution.  To best elucidate the etiology of the symptoms, we discussed pursuing an EMG and we will follow-up further once study is completed.  Patient is in agreement with the plan, all questions were addressed.    PLAN:  Reviewed spine anatomy and disease process. Discussed diagnosis and treatment options with the patient today. A shared decision making model was used. The patient's values and choices were respected. The following represents what was discussed and decided upon by the provider and the patient.    1. DIAGNOSTIC TESTS  MRI of lumbar spine was ordered for further characterization of soft tissues and/or neural compression    2. PHYSICAL THERAPY  Patient sees a chiropractor regularly.  Patient also does a daily exercise program under the supervision of a   Discussed the importance of core strengthening, ROM, stretching exercises with the patient and how each of these entities is important in decreasing pain.  Explained to the patient that the purpose of physical therapy is to teach  the patient a home exercise program.  These exercises need to be performed every day in order to decrease pain and prevent future occurrences of pain.  Likened it to brushing one's teeth.      3. MEDICATIONS:  Discussed multiple medication options today with patient. Discussed risks, side effects, and proper use of medications. Patient verbalized understanding.  No new medications ordered at this visit.    4. INTERVENTIONS:  Patient requires further imaging to assess what interventional options may be best for them.    5. OTHER REFERRALS:  No other referrals at this time.    6. FOLLOW-UP  To review imaging results once imaging is completed  Patient advised to contact our office for earlier appointment if symptoms worsening or not improving, or any side effects are noticed.    Advised patient to call the Spine Center if symptoms worsen or you have problems controlling bladder and bowel function.   ______________________________________________________________________    SUBJECTIVE:   Cliff Dai  is a 74year old female who presents today for follow-up evaluation.    Patient reports no significant changes in her low back pain.  Continues to be primarily on the left side in the low to mid lumbar area.  No referral into the hip, buttocks, or thigh, and no radiating symptoms going down the legs.  She does note however that she is having tingling and paresthesias below the knees bilaterally and this has been going on for some time.    Patient sees a chiropractor regularly twice a week, and also works with a  on a daily basis for spine and general fitness.    No prior back surgeries    -Treatment to Date: As above      Oswestry (YESENIA) Questionnaire        6/28/2024    10:45 AM   OSWESTRY DISABILITY INDEX   Count 9   Sum 8   Oswestry Score (%) 17.78 %       Neck Disability Index:      6/28/2024    10:50 AM   Neck Disability Index (  Felician[employer] H. [employer] C. 1991. All rights reserved.; used with permission)    SECTION 1 - PAIN INTENSITY 2   SECTION 2 - PERSONAL CARE 0   SECTION 3 - LIFTING 3   SECTION 4 - READING 0   SECTION 5 - HEADACHES 3   SECTION 6 - CONCENTRATION 1   SECTION 7 - WORK 1   SECTION 8 - DRIVING 0   SECTION 9 - SLEEPING 0   SECTION 10 - RECREATION 0   Count 10   Sum 10   Raw Score: /50 10   Neck Disability Index Score: (%) 20 %              -Medications:    Current Outpatient Medications   Medication Sig Dispense Refill     acetaminophen (TYLENOL) 500 MG tablet Take 1,000 mg by mouth 2 times daily as needed for mild pain       cycloSPORINE (RESTASIS) 0.05 % ophthalmic emulsion Place 1 drop into both eyes 2 times daily       Current Facility-Administered Medications   Medication Dose Route Frequency Provider Last Rate Last Admin     respiratory syncytial virus vaccine, bivalent (ABRYSVO) injection 0.5 mL  0.5 mL Intramuscular Once Hyun Mcgovern MD         Facility-Administered Medications Ordered in Other Visits   Medication Dose Route Frequency Provider Last Rate Last Admin     metoprolol (LOPRESSOR) injection 5-15 mg  5-15 mg Intravenous Q3 Min PRN Narayan Harden MD         nitroGLYcerin (NITROSTAT) sublingual tablet 0.4 mg  0.4 mg Sublingual Q15 Min PRN Narayan Harden MD           Allergies   Allergen Reactions     Cephalexin Itching     Glucose GI Disturbance     Gas     Lactose GI Disturbance     Gas     Other Environmental Allergy      PN: LW CM1: >>> NO CONTRAST ADVERSE REACTION <<<     Reaction :  PN: LW CM1: >>> NO CONTRAST ADVERSE REACTION <<<     Reaction :       Past Medical History:   Diagnosis Date     Arthritis     OSTEOARTHRITIS BOTH SHOULDERS     Osteoporosis      Peripheral neuropathy      Sjogren's syndrome (H24)      Stress incontinence         Patient Active Problem List   Diagnosis     Presence of artificial shoulder joint     Hip pain, left     Bilateral shoulder region arthritis     Cervicalgia     Edema     Excessive thirst     Female stress incontinence      Hyperglycemia     Hypokalemia     Inflammation of joint of shoulder region     Low back pain     Osteoarthritis of both shoulders     Osteoporosis     Pain in joint, shoulder region     Primary osteoarthritis of left hip     Prolapse of vaginal wall     Greater trochanteric bursitis of left hip     Sjogren's syndrome (H24)     Dermatitis     Symptomatic menopausal or female climacteric states     Urticaria     Vertigo     Vitamin D deficiency     Peripheral nerve disease     Sicca syndrome (H24)     Sciatica     Memory loss       Past Surgical History:   Procedure Laterality Date     APPENDECTOMY  2008     ARTHROPLASTY SHOULDER Left 2/1/2017    Procedure: ARTHROPLASTY SHOULDER;  Surgeon: Lui Gonzalez MD;  Location: [facility]     CHOLECYSTECTOMY  2008     GYN SURGERY  2012    VAG HYST, OOPHORECTOMY     GYN SURGERY  2008    ANTERIOR COLPORRHAPHY, REPAIR CYSTOCELE     HC ANTER COLPORRHAPHY,BLAD/VAGINA      Description: Anterior Colporrhaphy, Repair Of Cystocele;  Recorded: 02/25/2008;     HC REMOVAL GALLBLADDER      Description: Cholecystectomy;  Recorded: 10/28/2008;     HYSTERECTOMY  2004     OOPHORECTOMY Bilateral 2004     [place] VAGINAL HYSTERECTOMY,UTERUS 250 GMS/<      Description: Vaginal Hysterectomy;  Recorded: 02/25/2012;  Comments: for hypermenorrhea/ prolapse; Dr. Octavio LYNN APPENDECTOMY      Description: Appendectomy;  Recorded: 10/28/2008;       Family History   Problem Relation Age of Onset     Breast Cancer Mother 57.00     Breast Cancer Maternal Grandmother         Unsure of age       Reviewed past medical, surgical, and family history with patient found on new patient intake packet located in EMR Media tab.     SOCIAL HX: Reviewed    ROS: Specifically negative for bowel/bladder dysfunction, balance changes, headache, dizziness, foot drop, fevers, chills, appetite changes, nausea/vomiting, unexplained weight loss. Otherwise 13 systems reviewed are negative. Please see the patient's intake  questionnaire from today for details.    OBJECTIVE:  /60   Pulse 81   LMP  (LMP Unknown)     PHYSICAL EXAMINATION:  --CONSTITUTIONAL: Vital signs as above. No acute distress. The patient is well nourished and well groomed.  --PSYCHIATRIC: The patient is awake, alert, oriented to person, place, time and answering questions appropriately with clear speech. Appropriate mood and affect   --RESPIRATORY: Normal rhythm and effort. No abnormal accessory muscle breathing patterns noted.   --GROSS MOTOR: Easily arises from a seated position.  --LUMBAR SPINE: Inspection reveals no evidence of deformity. Range of motion is not limited in flexion, extension, lateral rotation. Mild tenderness to palpation of lumbar paraspinals, no tenderness in spinous processes.  Facet loading (Jade test) negative, seated SLR negative  --HIPS: Full range of motion bilaterally.  --LOWER EXTREMITY MOTOR TESTING:  Hip flexion left 5/5, right 5/5  Knee extension left 5/5, right 5/5  Ankle dorsiflexors left 5/5, right 5/5  Ankle plantarflexors left 5/5, right 5/5   Great toe extension left 5/5, right 5/5   Knee flexion left 5/5, right 5/5  --NEUROLOGIC: Sensation to lower extremities is intact bilaterally in L2-S1 dermatomes.  Negative Espinoza's bilaterally. Reflexes intact in BLE, no clonus.  --VASCULAR: Warm upper limbs bilaterally. Capillary refill in the upper extremities is less than 1 second.    RESULTS: Available medical records from [facility] and any other outside records were reviewed today.     Imaging:  Available relevant imaging was personally reviewed and interpreted today. The images were shown to the patient and the findings were explained using a spine model.    MRI Lumbar Spine 7/23/24:  IMPRESSION:  1.  Multilevel degenerative changes in the lumbar spine as above have progressed/worsened from 1/6/2016 notably at mid lumbar levels. Progressive interspace narrowing and low-grade degenerative retrolistheses has  developed from prior study.     2.  Slight increased size of disc herniation/disc bulging from prior study as well as for aggressive facet joint arthropathy given above.     3.  There is no severe spinal stenosis present with multilevel mild spinal canal narrowing. Multilevel foraminal narrowing mild-moderate in degree noted progressed from prior studies including at the left L3-4 level. Modic type I endplate signal changes   about the narrowed interspace L1-2 involving the inferior endplate L1 can serve as an independent source of pain generation with associated small Schmorl's node endplate deformity seen series 4 image 11. Multilevel Modic type II endplate signal changes   noted about additional narrowed lumbar interspaces. Nothing for a marrow-infiltrative process.    XR Lumbar Spine 2/3 Views    Result Date: 6/6/2024  EXAM: XR LUMBAR SPINE 2/3 VIEWS LOCATION: [facility] DATE: 6/5/2024 INDICATION: low back pain    left sided   no sciatica COMPARISON: Lumbar spine MRI: 1/6/2016.     IMPRESSION: 1.  No radiographic evidence of acute fracture involving the lumbar spine. Vertebral body heights are maintained. 2.  Lumbar dextrocurvature with apex at L3-L4. Retrolisthesis of L3 on L4 measuring approximately 4 mm. 8 mm leftward translation of L2 on L3. 3.  Multilevel degenerative disc disease throughout the lumbar spine with disc height loss greatest and advanced on the left at L2-L3 and at L4-L5. Multilevel facet arthropathy. 4.  No focal extraspinal soft tissue abnormality is evident.      Again, thank you for allowing me to participate in the care of your patient.        Sincerely,        Jadon Chavez MD

## 2024-08-09 NOTE — PATIENT INSTRUCTIONS
~Spine Center Scheduling #[phone].  ~Please call our [facility] Spine Nurse Navigation #[phone] with any questions or concerns about your treatment plan, if symptoms worsen and you would like to be seen urgently, or if you have problems controlling bladder and bowel function.  ~For any future flareups or new symptoms, recommend follow-up in clinic or contact the nurse navigator line.  ~Please note that any My Chart messages may take multiple days for a response due to the high volume of patients seen in clinic.  Anything sent Friday night or after will be answered the following week when able.     EMG Clinic - call [phone] to schedule.

## 2024-08-09 NOTE — PROGRESS NOTES
ASSESSMENT:  Cliff Dai is a 74-year-old female presents for follow-up of :         Diagnoses and all orders for this visit:  Lumbar radiculopathy  -     EMG; Future  Peripheral polyneuropathy  -     EMG; Future       Patient is neurologically intact on exam. No myelopathic or red flag symptoms.    Patient presents for follow-up and imaging review related to her left-sided low back pain, and she also notes tingling and paresthesias in a symmetric distribution below the knees bilaterally.  We discussed the possibility of lower lumbosacral radiculopathy versus peripheral neuropathy, which I suspect is likely given the symmetric nondermatomal nature of the symptom distribution.  To best elucidate the etiology of the symptoms, we discussed pursuing an EMG and we will follow-up further once study is completed.  Patient is in agreement with the plan, all questions were addressed.    PLAN:  Reviewed spine anatomy and disease process. Discussed diagnosis and treatment options with the patient today. A shared decision making model was used. The patient's values and choices were respected. The following represents what was discussed and decided upon by the provider and the patient.    1. DIAGNOSTIC TESTS  MRI of lumbar spine was ordered for further characterization of soft tissues and/or neural compression    2. PHYSICAL THERAPY  Patient sees a chiropractor regularly.  Patient also does a daily exercise program under the supervision of a   Discussed the importance of core strengthening, ROM, stretching exercises with the patient and how each of these entities is important in decreasing pain.  Explained to the patient that the purpose of physical therapy is to teach the patient a home exercise program.  These exercises need to be performed every day in order to decrease pain and prevent future occurrences of pain.  Likened it to brushing one's teeth.      3. MEDICATIONS:  Discussed multiple medication  options today with patient. Discussed risks, side effects, and proper use of medications. Patient verbalized understanding.  No new medications ordered at this visit.    4. INTERVENTIONS:  Patient requires further imaging to assess what interventional options may be best for them.    5. OTHER REFERRALS:  No other referrals at this time.    6. FOLLOW-UP  To review imaging results once imaging is completed  Patient advised to contact our office for earlier appointment if symptoms worsening or not improving, or any side effects are noticed.    Advised patient to call the Spine Center if symptoms worsen or you have problems controlling bladder and bowel function.   ______________________________________________________________________    SUBJECTIVE:   Cliff Dai  is a 74year old female who presents today for follow-up evaluation.    Patient reports no significant changes in her low back pain.  Continues to be primarily on the left side in the low to mid lumbar area.  No referral into the hip, buttocks, or thigh, and no radiating symptoms going down the legs.  She does note however that she is having tingling and paresthesias below the knees bilaterally and this has been going on for some time.    Patient sees a chiropractor regularly twice a week, and also works with a  on a daily basis for spine and general fitness.    No prior back surgeries    -Treatment to Date: As above      Oswestry (YESENIA) Questionnaire        6/28/2024    10:45 AM   OSWESTRY DISABILITY INDEX   Count 9   Sum 8   Oswestry Score (%) 17.78 %       Neck Disability Index:      6/28/2024    10:50 AM   Neck Disability Index (  Feliciano H. and [employer]. 1991. All rights reserved.; used with permission)   SECTION 1 - PAIN INTENSITY 2   SECTION 2 - PERSONAL CARE 0   SECTION 3 - LIFTING 3   SECTION 4 - READING 0   SECTION 5 - HEADACHES 3   SECTION 6 - CONCENTRATION 1   SECTION 7 - WORK 1   SECTION 8 - DRIVING 0   SECTION 9 - SLEEPING 0    SECTION 10 - RECREATION 0   Count 10   Sum 10   Raw Score: /50 10   Neck Disability Index Score: (%) 20 %              -Medications:    Current Outpatient Medications   Medication Sig Dispense Refill    acetaminophen (TYLENOL) 500 MG tablet Take 1,000 mg by mouth 2 times daily as needed for mild pain      cycloSPORINE (RESTASIS) 0.05 % ophthalmic emulsion Place 1 drop into both eyes 2 times daily       Current Facility-Administered Medications   Medication Dose Route Frequency Provider Last Rate Last Admin    respiratory syncytial virus vaccine, bivalent (ABRYSVO) injection 0.5 mL  0.5 mL Intramuscular Once Hyun Mcgovern MD         Facility-Administered Medications Ordered in Other Visits   Medication Dose Route Frequency Provider Last Rate Last Admin    metoprolol (LOPRESSOR) injection 5-15 mg  5-15 mg Intravenous Q3 Min PRN Narayan Harden MD        nitroGLYcerin (NITROSTAT) sublingual tablet 0.4 mg  0.4 mg Sublingual Q15 Min PRN Narayan Harden MD           Allergies   Allergen Reactions    Cephalexin Itching    Glucose GI Disturbance     Gas    Lactose GI Disturbance     Gas    Other Environmental Allergy      PN: LW CM1: >>> NO CONTRAST ADVERSE REACTION <<<     Reaction :  PN: LW CM1: >>> NO CONTRAST ADVERSE REACTION <<<     Reaction :       Past Medical History:   Diagnosis Date    Arthritis     OSTEOARTHRITIS BOTH SHOULDERS    Osteoporosis     Peripheral neuropathy     Sjogren's syndrome (H24)     Stress incontinence         Patient Active Problem List   Diagnosis    Presence of artificial shoulder joint    Hip pain, left    Bilateral shoulder region arthritis    Cervicalgia    Edema    Excessive thirst    Female stress incontinence    Hyperglycemia    Hypokalemia    Inflammation of joint of shoulder region    Low back pain    Osteoarthritis of both shoulders    Osteoporosis    Pain in joint, shoulder region    Primary osteoarthritis of left hip    Prolapse of vaginal wall    Greater trochanteric  bursitis of left hip    Sjogren's syndrome (H24)    Dermatitis    Symptomatic menopausal or female climacteric states    Urticaria    Vertigo    Vitamin D deficiency    Peripheral nerve disease    Sicca syndrome (H24)    Sciatica    Memory loss       Past Surgical History:   Procedure Laterality Date    APPENDECTOMY  2008    ARTHROPLASTY SHOULDER Left 2/1/2017    Procedure: ARTHROPLASTY SHOULDER;  Surgeon: Lui Gonzalez MD;  Location: [facility]    CHOLECYSTECTOMY  2008    GYN SURGERY  2012    VAG HYST, OOPHORECTOMY    GYN SURGERY  2008    ANTERIOR COLPORRHAPHY, REPAIR CYSTOCELE    HC ANTER COLPORRHAPHY,BLAD/VAGINA      Description: Anterior Colporrhaphy, Repair Of Cystocele;  Recorded: 02/25/2008;    HC REMOVAL GALLBLADDER      Description: Cholecystectomy;  Recorded: 10/28/2008;    HYSTERECTOMY  2004    OOPHORECTOMY Bilateral 2004    [place] VAGINAL HYSTERECTOMY,UTERUS 250 GMS/<      Description: Vaginal Hysterectomy;  Recorded: 02/25/2012;  Comments: for hypermenorrhea/ prolapse; Dr. Octavio LYNN APPENDECTOMY      Description: Appendectomy;  Recorded: 10/28/2008;       Family History   Problem Relation Age of Onset    Breast Cancer Mother 57.00    Breast Cancer Maternal Grandmother         Unsure of age       Reviewed past medical, surgical, and family history with patient found on new patient intake packet located in EMR Media tab.     SOCIAL HX: Reviewed    ROS: Specifically negative for bowel/bladder dysfunction, balance changes, headache, dizziness, foot drop, fevers, chills, appetite changes, nausea/vomiting, unexplained weight loss. Otherwise 13 systems reviewed are negative. Please see the patient's intake questionnaire from today for details.    OBJECTIVE:  /60   Pulse 81   LMP  (LMP Unknown)     PHYSICAL EXAMINATION:  --CONSTITUTIONAL: Vital signs as above. No acute distress. The patient is well nourished and well groomed.  --PSYCHIATRIC: The patient is awake, alert, oriented to person, place,  time and answering questions appropriately with clear speech. Appropriate mood and affect   --RESPIRATORY: Normal rhythm and effort. No abnormal accessory muscle breathing patterns noted.   --GROSS MOTOR: Easily arises from a seated position.  --LUMBAR SPINE: Inspection reveals no evidence of deformity. Range of motion is not limited in flexion, extension, lateral rotation. Mild tenderness to palpation of lumbar paraspinals, no tenderness in spinous processes.  Facet loading (Jade test) negative, seated SLR negative  --HIPS: Full range of motion bilaterally.  --LOWER EXTREMITY MOTOR TESTING:  Hip flexion left 5/5, right 5/5  Knee extension left 5/5, right 5/5  Ankle dorsiflexors left 5/5, right 5/5  Ankle plantarflexors left 5/5, right 5/5   Great toe extension left 5/5, right 5/5   Knee flexion left 5/5, right 5/5  --NEUROLOGIC: Sensation to lower extremities is intact bilaterally in L2-S1 dermatomes.  Negative Espinoza's bilaterally. Reflexes intact in BLE, no clonus.  --VASCULAR: Warm upper limbs bilaterally. Capillary refill in the upper extremities is less than 1 second.    RESULTS: Available medical records from [facility] and any other outside records were reviewed today.     Imaging:  Available relevant imaging was personally reviewed and interpreted today. The images were shown to the patient and the findings were explained using a spine model.    MRI Lumbar Spine 7/23/24:  IMPRESSION:  1.  Multilevel degenerative changes in the lumbar spine as above have progressed/worsened from 1/6/2016 notably at mid lumbar levels. Progressive interspace narrowing and low-grade degenerative retrolistheses has developed from prior study.     2.  Slight increased size of disc herniation/disc bulging from prior study as well as for aggressive facet joint arthropathy given above.     3.  There is no severe spinal stenosis present with multilevel mild spinal canal narrowing. Multilevel foraminal narrowing mild-moderate  in degree noted progressed from prior studies including at the left L3-4 level. Modic type I endplate signal changes   about the narrowed interspace L1-2 involving the inferior endplate L1 can serve as an independent source of pain generation with associated small Schmorl's node endplate deformity seen series 4 image 11. Multilevel Modic type II endplate signal changes   noted about additional narrowed lumbar interspaces. Nothing for a marrow-infiltrative process.    XR Lumbar Spine 2/3 Views    Result Date: 6/6/2024  EXAM: XR LUMBAR SPINE 2/3 VIEWS LOCATION: [facility] DATE: 6/5/2024 INDICATION: low back pain    left sided   no sciatica COMPARISON: Lumbar spine MRI: 1/6/2016.     IMPRESSION: 1.  No radiographic evidence of acute fracture involving the lumbar spine. Vertebral body heights are maintained. 2.  Lumbar dextrocurvature with apex at L3-L4. Retrolisthesis of L3 on L4 measuring approximately 4 mm. 8 mm leftward translation of L2 on L3. 3.  Multilevel degenerative disc disease throughout the lumbar spine with disc height loss greatest and advanced on the left at L2-L3 and at L4-L5. Multilevel facet arthropathy. 4.  No focal extraspinal soft tissue abnormality is evident.

## 2024-08-13 ENCOUNTER — TELEPHONE (OUTPATIENT)
Dept: CARDIOLOGY | Facility: CLINIC | Age: 74
End: 2024-08-13
Payer: COMMERCIAL

## 2024-08-15 ENCOUNTER — HOSPITAL ENCOUNTER (OUTPATIENT)
Dept: CARDIOLOGY | Facility: CLINIC | Age: 74
Discharge: HOME | End: 2024-08-15
Attending: INTERNAL MEDICINE
Payer: COMMERCIAL

## 2024-08-15 ENCOUNTER — HOSPITAL ENCOUNTER (OUTPATIENT)
Dept: CT IMAGING | Facility: CLINIC | Age: 74
Discharge: HOME | End: 2024-08-15
Attending: INTERNAL MEDICINE
Payer: COMMERCIAL

## 2024-08-15 VITALS
BODY MASS INDEX: 23.04 KG/M2 | DIASTOLIC BLOOD PRESSURE: 52 MMHG | SYSTOLIC BLOOD PRESSURE: 97 MMHG | HEIGHT: 63 IN | HEART RATE: 77 BPM | WEIGHT: 130 LBS

## 2024-08-15 DIAGNOSIS — R60.0 BILATERAL LEG EDEMA: ICD-10-CM

## 2024-08-15 DIAGNOSIS — I25.9 CHEST PAIN DUE TO MYOCARDIAL ISCHEMIA, UNSPECIFIED ISCHEMIC CHEST PAIN TYPE: ICD-10-CM

## 2024-08-15 LAB
BSA FOR ECHO PROCEDURE: 0 M2
CREAT BLD-MCNC: 0.6 MG/DL (ref 0.6–1.1)
CV CALCIUM SCORE AGATSTON LM: 0
CV CALCIUM SCORING AGATSON LAD: 0
CV CALCIUM SCORING AGATSTON CX: 0
CV CALCIUM SCORING AGATSTON RCA: 0
CV CALCIUM SCORING AGATSTON TOTAL: 0
EGFRCR SERPLBLD CKD-EPI 2021: >60 ML/MIN/1.73M2
LVEF ECHO: NORMAL

## 2024-08-15 PROCEDURE — 93306 TTE W/DOPPLER COMPLETE: CPT

## 2024-08-15 PROCEDURE — 250N000013 HC RX MED GY IP 250 OP 250 PS 637: Performed by: INTERNAL MEDICINE

## 2024-08-15 PROCEDURE — 75574 CT ANGIO HRT W/3D IMAGE: CPT | Mod: 26 | Performed by: STUDENT IN AN ORGANIZED HEALTH CARE EDUCATION/TRAINING PROGRAM

## 2024-08-15 PROCEDURE — 82565 ASSAY OF CREATININE: CPT

## 2024-08-15 PROCEDURE — 250N000011 HC RX IP 250 OP 636: Performed by: INTERNAL MEDICINE

## 2024-08-15 PROCEDURE — 93306 TTE W/DOPPLER COMPLETE: CPT | Mod: 26 | Performed by: INTERNAL MEDICINE

## 2024-08-15 PROCEDURE — 75574 CT ANGIO HRT W/3D IMAGE: CPT

## 2024-08-15 RX ORDER — IOPAMIDOL 755 MG/ML
100 INJECTION, SOLUTION INTRAVASCULAR ONCE
Status: COMPLETED | OUTPATIENT
Start: 2024-08-15 | End: 2024-08-15

## 2024-08-15 RX ADMIN — IOPAMIDOL 100 ML: 755 INJECTION, SOLUTION INTRAVENOUS at 10:02

## 2024-08-15 RX ADMIN — NITROGLYCERIN 0.4 MG: 0.4 TABLET SUBLINGUAL at 09:58

## 2024-08-16 NOTE — PROGRESS NOTES
"Assessment & Plan     Benign exam,  imbalance does not seem related to vestibular system primarily.      Problem List Items Addressed This Visit    None  Visit Diagnoses       Imbalance    -  Primary    Relevant Orders    Physical Therapy  Referral    Adult Audiology  Referral    Sensorineural hearing loss, bilateral        Relevant Orders    Adult Audiology  Referral             Review of external notes as documented elsewhere in note    16 minutes spent on the date of the encounter doing chart review, history and exam, documentation and further activities per the note  {     TRESA Headley  [facility]    Subjective     HPI-    7/6/2020 audio  Last tested 2- 6- 20; bilat. nonpulsatile tinnitus began mid- April 2020, which was not present when evaluated in Feb. Per notes from 2- 6- 20: Cliff reports a history of difficulty hearing bilat. for the past year. She states she has trouble hearing on the phone and hearing in groups. She denies otalgia, otorrhea, aural fullness, ear surgery, dizziness, noise exposure, and fam hx of HL. Results: Non- occluding cerumen, bilat. R: Nroaml 250- 2000 Hz, slopinkg to moderate- severe SNHL for 3- 8 KHz. L: Normal 250- 750 Hz, sloping to mild to mod- severe SNHL for 1- 8 KHz. Results stable, bilat. , per 2- 6- 20 audiogram. Excellent word rec ability in quiet, bilat. Normal tymps, bilat. Ipsi 1 KHz acoustic reflexes absent, bilat. , but present in contra condition in both ears.     7/16 PRIMARY CARE PROVIDER note-  3. Abnormality of gait due to impairment of balance  Patient has poor balance - her consultants have told her she should check in with ENT - will offer referral.     Imbalance is due to BPPV - BP changes ??  Patient has written down paroxysmal as a diagnosis (discussed this is a descriptor, not a diagnosis)  Chiro thinks her balance should be evaluated by ENT - patient doesn't want to say \"dizzy\", but has to " "walk to keep her balance      Normal for age brain MRI last year.      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  Sitting up and standing up can make it happen.  No vertigo but \" feels like something isn't correcting right in her ears.\"  Never had vertigo.  Chiro has been checking orthostatics and those are ok.  She has seen a cardiologist in the past.        Review of Systems   ENT as above      Objective    LMP  (LMP Unknown)     Physical Exam     Constitutional:   The patient was in no acute distress.      Head/Face:   Normocephalic and atraumatic.  No lesions or scars.     Ears:  The tympanic membranes are normal in appearance, bony landmarks are intact.  No retraction, perforation, or masses.   No fluid or purulence was seen in the external canal or the middle ear. No evidence of infection of the middle ear or external canal, cerumen was normal in appearance.    Nose:  Anterior rhinoscopy revealed midline septum and absence of purulence or polyps.      Mouth:  Normal tongue, floor of mouth, buccal mucosa, and palate.  No lesions, ulceration or  masses on inspection, normal voice quality      Oropharynx:  Normal mucosa, palate symmetric with normal elevation. Tonsils  not visualized       Neck:  Supple with normal laryngeal and tracheal landmarks. No palpable thyroid.     Lymphatic:  There is no palpable lymphadenopathy in the neck.         Neuro: Alert and oriented - normal speech. Cranial nerves 2-12 intact.  Normal strength. Using extremities freely.  Finger to nose normal.  Heel to shin nomral.   Balance evaluation-  Gait and Station Normal.  Mild / mod  en bloc turning.  Romberg Negative.    The patient was able to stand independently in the exam room without support or assistance.         "

## 2024-08-23 ENCOUNTER — OFFICE VISIT (OUTPATIENT)
Dept: PHYSICAL MEDICINE AND REHAB | Facility: CLINIC | Age: 74
End: 2024-08-23
Attending: STUDENT IN AN ORGANIZED HEALTH CARE EDUCATION/TRAINING PROGRAM
Payer: COMMERCIAL

## 2024-08-23 DIAGNOSIS — M54.16 LUMBAR RADICULOPATHY: ICD-10-CM

## 2024-08-23 DIAGNOSIS — G62.9 PERIPHERAL POLYNEUROPATHY: ICD-10-CM

## 2024-08-23 PROCEDURE — 95886 MUSC TEST DONE W/N TEST COMP: CPT | Performed by: PHYSICAL MEDICINE & REHABILITATION

## 2024-08-23 PROCEDURE — 95910 NRV CNDJ TEST 7-8 STUDIES: CPT | Performed by: PHYSICAL MEDICINE & REHABILITATION

## 2024-08-23 NOTE — LETTER
8/23/2024      Cliff Dai  [address]  [address]  [place] [address]      Dear Colleague,    Thank you for referring your patient, Cliff Dai, to the [facility]. Please see a copy of my visit note below.    [facility]  [address] [place] [address] [address]  [place] [address]  Office: [phone] Fax: [phone]    Electromyography and Nerve Conduction Study Report        Indication: Patient presents at the request of Dr. Jadon Chavez for bilateral lower extremity.  She has low back pain.  Bilateral foot numbness and tingling up to the knees.  On exam, normal sensation to light touch for the bilateral lower extremities, 2+ patellar 1+ Achilles reflexes bilaterally with downgoing toes, normal muscle strength throughout the major muscle groups of the bilateral lower extremities.      Pt Exam Discussion (Communication Barriers):  Electromyography and nerve conduction testing, including associated discomfort, risks, benefits, and alternatives was discussed with the patient prior to the procedure.  No learning/ communication barriers; patient verbalized understanding of procedure.  Informed consent was obtained.           Pt Assessment:  Testing was successfully completed; patient tolerated testing well.       Blood Thinners: None   Skin Temperature: Warmed 33.5                   EMG/NCS  results:     Nerve Conduction Studies  Motor Sites      Segment Distal Latency Neg. Amp CV F-Latency F-Estimate Comment   Site  (ms) (mV) (m/s) (ms) (ms)    Left Fibular (EDB) Motor   Ankle Ankle-EDB 4.7 3.7       Knee Knee-Ankle 12.5 3.5 50      Right Fibular (EDB) Motor   Ankle Ankle-EDB 4.8 4.4       Knee Knee-Ankle 12.6 4.4 48      Left Tibial (AH) Motor   Ankle Ankle-AH 3.6 8.0       Knee Knee-Ankle 10.6 5.1 57      Right Tibial (AH) Motor   Ankle Ankle-AH 3.2 13.5       Knee Knee-Ankle 11.8 8.0 48        Sensory Sites      Onset Lat Peak Lat Amp CV Comment   Site  (ms) (ms) ( V) (m/s)    Left Sural Sensory   B-Ankle 1.00 1.45 6 -    Right Sural Sensory   B-Ankle 2.8 3.3 3 -      H-Reflex Sites      M-Lat H Lat H Neg Amp   Site (ms) (ms) (mV)   Left Tibial (Soleus) H-Reflex   Pop Fossa 5.9 31.4 1.01   Right Tibial (Soleus) H-Reflex   Pop Fossa 5.4 31.1 0.67     H-Reflex Sites      Lt. H Lat Rt. H Lat L-R H Lat L-R H Neg Amp   Site (ms) (ms) (ms) Norm (mV)   Tibial (Soleus) H-Reflex   Pop Fossa 31.4 31.1 0.30  < 3.0 0.34       NCS Waveforms:    Motor                Sensory         H-Reflex           Electromyography     Side Muscle Nerve Root Ins Act Fibs Psw Fasc Recrt Dur Amp Poly Comment   Right AntTibialis Dp Br Fibular L4-5 Nml Nml Nml Nml Nml Nml Nml 0    Right Gastroc Tibial S1-2 Nml Nml Nml Nml Nml Nml Nml 0    Right Fibularis Long Sup Br Fibular L5-S1 Nml Nml Nml Nml Nml Nml Nml 0    Right VastusLat Femoral L2-4 Nml Nml Nml Nml Nml Nml Nml 0    Right TensorFascLat SupGluteal L4-5, S1 Nml Nml Nml Nml Nml Nml Nml 0    Left AntTibialis Dp Br Fibular L4-5 Nml Nml Nml Nml Nml Nml Nml 0    Left Gastroc Tibial S1-2 Nml Nml Nml Nml Nml Nml Nml 0    Left Fibularis Long Sup Br Fibular L5-S1 Nml Nml Nml Nml Nml Nml Nml 0    Left VastusLat Femoral L2-4 Nml Nml Nml Nml Nml Nml Nml 0    Left RectFemoris Femoral L2-4 Nml Nml Nml Nml Nml Nml Nml 0          Comment NCS: Essentially normal study  1.   Low amplitude or absent bilateral sural SNAPs.  2.  Normal bilateral peroneal and tibial CMAP's and symmetric tibial H reflexes    Comment EMG: Normal study  1.  Normal needle EMG bilateral lower extremities.     Interpretation: Essentially normal study: There is electrodiagnostic evidence of:    1.  Low amplitude or absent bilateral sural sensory nerve action potentials.  These findings can be normal for the patient's age, but I cannot completely exclude a very mild sensory or sensorimotor peripheral polyneuropathy.      2.  There is no electrodiagnostic evidence of lumbosacral  radiculopathy, lumbosacral plexopathy, or focal neuropathy in the bilateral lower extremities.    The testing was completed in its entirety by the physician.      It was our pleasure caring for your patient today, if there any questions or concerns please do not hesitate to contact us.      Again, thank you for allowing me to participate in the care of your patient.        Sincerely,        William Haile, DO

## 2024-08-23 NOTE — PATIENT INSTRUCTIONS
Thank you for choosing the [facility] for your EMG testing.    The ordering provider will receive your final EMG results within the next few days.  Please follow up with your provider for the results and further treatment recommendations.

## 2024-08-23 NOTE — PROGRESS NOTES
[facility]  [address] [place] [address] [address]  [place] [address]  Office: [phone] Fax: [phone]    Electromyography and Nerve Conduction Study Report        Indication: Patient presents at the request of Dr. Jadon Chavez for bilateral lower extremity.  She has low back pain.  Bilateral foot numbness and tingling up to the knees.  On exam, normal sensation to light touch for the bilateral lower extremities, 2+ patellar 1+ Achilles reflexes bilaterally with downgoing toes, normal muscle strength throughout the major muscle groups of the bilateral lower extremities.      Pt Exam Discussion (Communication Barriers):  Electromyography and nerve conduction testing, including associated discomfort, risks, benefits, and alternatives was discussed with the patient prior to the procedure.  No learning/ communication barriers; patient verbalized understanding of procedure.  Informed consent was obtained.           Pt Assessment:  Testing was successfully completed; patient tolerated testing well.       Blood Thinners: None   Skin Temperature: Warmed 33.5                   EMG/NCS  results:     Nerve Conduction Studies  Motor Sites      Segment Distal Latency Neg. Amp CV F-Latency F-Estimate Comment   Site  (ms) (mV) (m/s) (ms) (ms)    Left Fibular (EDB) Motor   Ankle Ankle-EDB 4.7 3.7       Knee Knee-Ankle 12.5 3.5 50      Right Fibular (EDB) Motor   Ankle Ankle-EDB 4.8 4.4       Knee Knee-Ankle 12.6 4.4 48      Left Tibial (AH) Motor   Ankle Ankle-AH 3.6 8.0       Knee Knee-Ankle 10.6 5.1 57      Right Tibial (AH) Motor   Ankle Ankle-AH 3.2 13.5       Knee Knee-Ankle 11.8 8.0 48        Sensory Sites      Onset Lat Peak Lat Amp CV Comment   Site (ms) (ms) ( V) (m/s)    Left Sural Sensory   B-Ankle 1.00 1.45 6 -    Right Sural Sensory   B-Ankle 2.8 3.3 3 -      H-Reflex Sites      M-Lat H Lat H Neg Amp   Site (ms) (ms) (mV)   Left Tibial (Soleus) H-Reflex   Pop Fossa 5.9 31.4 1.01   Right  Tibial (Soleus) H-Reflex   Pop Fossa 5.4 31.1 0.67     H-Reflex Sites      Lt. H Lat Rt. H Lat L-R H Lat L-R H Neg Amp   Site (ms) (ms) (ms) Norm (mV)   Tibial (Soleus) H-Reflex   Pop Fossa 31.4 31.1 0.30  < 3.0 0.34       NCS Waveforms:    Motor                Sensory         H-Reflex           Electromyography     Side Muscle Nerve Root Ins Act Fibs Psw Fasc Recrt Dur Amp Poly Comment   Right AntTibialis Dp Br Fibular L4-5 Nml Nml Nml Nml Nml Nml Nml 0    Right Gastroc Tibial S1-2 Nml Nml Nml Nml Nml Nml Nml 0    Right Fibularis Long Sup Br Fibular L5-S1 Nml Nml Nml Nml Nml Nml Nml 0    Right VastusLat Femoral L2-4 Nml Nml Nml Nml Nml Nml Nml 0    Right TensorFascLat SupGluteal L4-5, S1 Nml Nml Nml Nml Nml Nml Nml 0    Left AntTibialis Dp Br Fibular L4-5 Nml Nml Nml Nml Nml Nml Nml 0    Left Gastroc Tibial S1-2 Nml Nml Nml Nml Nml Nml Nml 0    Left Fibularis Long Sup Br Fibular L5-S1 Nml Nml Nml Nml Nml Nml Nml 0    Left VastusLat Femoral L2-4 Nml Nml Nml Nml Nml Nml Nml 0    Left RectFemoris Femoral L2-4 Nml Nml Nml Nml Nml Nml Nml 0          Comment NCS: Essentially normal study  1.   Low amplitude or absent bilateral sural SNAPs.  2.  Normal bilateral peroneal and tibial CMAP's and symmetric tibial H reflexes    Comment EMG: Normal study  1.  Normal needle EMG bilateral lower extremities.     Interpretation: Essentially normal study: There is electrodiagnostic evidence of:    1.  Low amplitude or absent bilateral sural sensory nerve action potentials.  These findings can be normal for the patient's age, but I cannot completely exclude a very mild sensory or sensorimotor peripheral polyneuropathy.      2.  There is no electrodiagnostic evidence of lumbosacral radiculopathy, lumbosacral plexopathy, or focal neuropathy in the bilateral lower extremities.    The testing was completed in its entirety by the physician.      It was our pleasure caring for your patient today, if there any questions or concerns please  do not hesitate to contact us.

## 2024-08-26 ENCOUNTER — TELEPHONE (OUTPATIENT)
Dept: PHYSICAL MEDICINE AND REHAB | Facility: CLINIC | Age: 74
End: 2024-08-26

## 2024-08-26 ENCOUNTER — OFFICE VISIT (OUTPATIENT)
Dept: FAMILY MEDICINE | Facility: CLINIC | Age: 74
End: 2024-08-26
Payer: COMMERCIAL

## 2024-08-26 VITALS
DIASTOLIC BLOOD PRESSURE: 72 MMHG | OXYGEN SATURATION: 98 % | TEMPERATURE: 98.2 F | RESPIRATION RATE: 16 BRPM | WEIGHT: 131 LBS | HEART RATE: 68 BPM | HEIGHT: 65 IN | SYSTOLIC BLOOD PRESSURE: 111 MMHG | BODY MASS INDEX: 21.83 KG/M2

## 2024-08-26 DIAGNOSIS — Z23 NEED FOR TDAP VACCINATION: ICD-10-CM

## 2024-08-26 DIAGNOSIS — K59.03 DRUG INDUCED CONSTIPATION: Primary | ICD-10-CM

## 2024-08-26 PROCEDURE — 99213 OFFICE O/P EST LOW 20 MIN: CPT | Performed by: FAMILY MEDICINE

## 2024-08-26 ASSESSMENT — PAIN SCALES - GENERAL: PAINLEVEL: NO PAIN (0)

## 2024-08-26 NOTE — TELEPHONE ENCOUNTER
EMG results reviewed, shows a mild sensory neuropathy which can be normal for patient's age. Recommend follow up in clinic to discuss back pain treatment

## 2024-08-26 NOTE — PROGRESS NOTES
"  1. Drug induced constipation  This is a 73 yo female with drug induced constipation - had taken Imodium for loose stools, then developed constipation, difficulty passing stools, abdominal pain.  Seen at [facility] for evaluation and treated for constipation.  Feels well now - reviewed medications.      I have reviewed available ER records,  notes, as well as imaging and lab results.  In addition I have reviewed the medication list and made any adjustments as indicated in orders.      2. Need for Tdap vaccination  Due for tetanus booster - ordered   - Tdap, tetanus-diptheria-acell pertussis, (BOOSTRIX) 5-2.5-18.5 LF-MCG/0.5 HUSEYIN injection; Inject 0.5 mLs into the muscle once for 1 dose.  Dispense: 0.5 mL; Refill: 0      Subjective   Cliff is a 74 year old, presenting for the following health issues:  Hospital F/U        8/26/2024    11:33 AM   Additional Questions   Roomed by Lisa     Vows she will never take Imodium again   Had dirrhea/loose stools  Took Imodium on Thursday  - by Sunday early a.m. was writhing in pain - hadn't passed stools -   Went to ER - and admitted -   Got an enema - didn't work very well - then gave her Gas X - worked really well!  Passed a lot of stools   Was in observation for a while   Had to eat something - \"their hospital food\" - had a gluten free tuna wrap - and egg salad - then, tasted like cardboard - ripped off all the wrap; ate the egg salad; had a sorbet - ate all that - then they released her  Has passed stool this last week - no problems    Heart stuff all checked out okay    Had EMG due to needles/pins in legs -   Reacted appropriately - then, had injections -   Passed the test -   Then, Dr. Silva - gave a report  Reviewed EMG report     This week will see ENT for balance -   A couple friends have told her it's in the ear    Has , chiropractor (twice a week - for headaches, legs),     Has edema - lower extremity          Hospital Follow-up Visit:    Hospital/Nursing " "Home/IP Rehab Facility:  [facility]  Date of Admission:   Date of Discharge:   Reason(s) for Admission: constipation  Was the patient in the ICU or did the patient experience delirium during hospitalization?  No  Do you have any other stressors you would like to discuss with your provider? No    Problems taking medications regularly:  None  Medication changes since discharge: None  Problems adhering to non-medication therapy:  None    Summary of hospitalization:  CareEverywhere information obtained and reviewed  Diagnostic Tests/Treatments reviewed.  Follow up needed: none  Other Healthcare Providers Involved in Patient s Care:         None  Update since discharge: improved.         Plan of care communicated with patient and family             Review of Systems   Constitutional:  Negative for chills and fever.   HENT:  Negative for ear pain and sore throat.    Eyes:  Negative for pain and visual disturbance.   Respiratory:  Negative for cough and shortness of breath.    Cardiovascular:  Negative for chest pain and palpitations.   Gastrointestinal:  Positive for constipation (resolved). Negative for abdominal pain (resolved) and vomiting.   Genitourinary:  Negative for dysuria and hematuria.   Musculoskeletal:  Negative for arthralgias and back pain.   Skin:  Negative for color change and rash.   Neurological:  Negative for seizures and syncope.        Mild cognitive deficits   Psychiatric/Behavioral:  The patient is nervous/anxious (mild).    All other systems reviewed and are negative.          Objective    /72 (BP Location: Right arm, Patient Position: Sitting, Cuff Size: Adult Regular)   Pulse 68   Temp 98.2  F (36.8  C) (Oral)   Resp 16   Ht 1.645 m (5' 4.76\")   Wt 59.4 kg (131 lb)   LMP  (LMP Unknown)   SpO2 98%   BMI 21.96 kg/m    Body mass index is 21.96 kg/m .  Physical Exam  Vitals reviewed.   Constitutional:       General: She is not in acute distress.     Appearance: Normal appearance.   HENT: "      Head: Normocephalic.      Right Ear: Tympanic membrane, ear canal and external ear normal.      Left Ear: Tympanic membrane, ear canal and external ear normal.      Nose: Nose normal.      Mouth/Throat:      Mouth: Mucous membranes are moist.      Pharynx: No posterior oropharyngeal erythema.   Eyes:      Extraocular Movements: Extraocular movements intact.      Conjunctiva/sclera: Conjunctivae normal.      Pupils: Pupils are equal, round, and reactive to light.   Cardiovascular:      Rate and Rhythm: Normal rate and regular rhythm.      Pulses: Normal pulses.      Heart sounds: Normal heart sounds. No murmur heard.  Pulmonary:      Effort: Pulmonary effort is normal.      Breath sounds: Normal breath sounds.   Abdominal:      Palpations: Abdomen is soft. There is no mass.      Tenderness: There is no abdominal tenderness. There is no guarding or rebound.   Musculoskeletal:         General: No deformity. Normal range of motion.      Cervical back: Normal range of motion and neck supple.   Lymphadenopathy:      Cervical: No cervical adenopathy.   Skin:     General: Skin is warm and dry.   Neurological:      General: No focal deficit present.      Mental Status: She is alert.   Psychiatric:         Mood and Affect: Mood normal.         Behavior: Behavior normal.            No results found for any visits on 08/26/24.      Prior to immunization administration, verified patients identity using patient s name and date of birth. Please see Immunization Activity for additional information.     Screening Questionnaire for Adult Immunization    Are you sick today?   No   Do you have allergies to medications, food, a vaccine component or latex?   Yes   Have you ever had a serious reaction after receiving a vaccination?   No   Do you have a long-term health problem with heart, lung, kidney, or metabolic disease (e.g., diabetes), asthma, a blood disorder, no spleen, complement component deficiency, a cochlear implant, or a  spinal fluid leak?  Are you on long-term aspirin therapy?   No   Do you have cancer, leukemia, HIV/AIDS, or any other immune system problem?   No   Do you have a parent, brother, or sister with an immune system problem?   No   In the past 3 months, have you taken medications that affect  your immune system, such as prednisone, other steroids, or anticancer drugs; drugs for the treatment of rheumatoid arthritis, Crohn s disease, or psoriasis; or have you had radiation treatments?   No   Have you had a seizure, or a brain or other nervous system problem?   No   During the past year, have you received a transfusion of blood or blood    products, or been given immune (gamma) globulin or antiviral drug?   No   For women: Are you pregnant or is there a chance you could become       pregnant during the next month?   No   Have you received any vaccinations in the past 4 weeks?   No     Immunization questionnaire was positive for at least one answer.  Notified .      Patient instructed to remain in clinic for 15 minutes afterwards, and to report any adverse reactions.     Screening performed by Lisa Barfield MA on 8/26/2024 at 11:40 AM.       Signed Electronically by: MARIUM KRUEGER MD

## 2024-08-29 ENCOUNTER — OFFICE VISIT (OUTPATIENT)
Dept: OTOLARYNGOLOGY | Facility: CLINIC | Age: 74
End: 2024-08-29
Payer: COMMERCIAL

## 2024-08-29 DIAGNOSIS — R26.89 IMBALANCE: Primary | ICD-10-CM

## 2024-08-29 DIAGNOSIS — H90.3 SENSORINEURAL HEARING LOSS, BILATERAL: ICD-10-CM

## 2024-08-29 PROCEDURE — 99203 OFFICE O/P NEW LOW 30 MIN: CPT | Performed by: PHYSICIAN ASSISTANT

## 2024-08-29 RX ORDER — CETIRIZINE HYDROCHLORIDE 10 MG/1
10 TABLET ORAL DAILY
COMMUNITY

## 2024-08-29 NOTE — LETTER
8/29/2024      Cliff Dai  [address]  [address]  [place] [address]      Dear Colleague,    Thank you for referring your patient, Cliff Dai, to the [facility]. Please see a copy of my visit note below.    Assessment & Plan    Benign exam,  imbalance does not seem related to vestibular system primarily.      Problem List Items Addressed This Visit    None  Visit Diagnoses       Imbalance    -  Primary    Relevant Orders    Physical Therapy  Referral    Adult Audiology  Referral    Sensorineural hearing loss, bilateral        Relevant Orders    Adult Audiology  Referral             Review of external notes as documented elsewhere in note    16 minutes spent on the date of the encounter doing chart review, history and exam, documentation and further activities per the note  {     TRESA Headley  [facility]    Subjective     HPI-    7/6/2020 audio  Last tested 2- 6- 20; bilat. nonpulsatile tinnitus began mid- April 2020, which was not present when evaluated in Feb. Per notes from 2- 6- 20: Cliff reports a history of difficulty hearing bilat. for the past year. She states she has trouble hearing on the phone and hearing in groups. She denies otalgia, otorrhea, aural fullness, ear surgery, dizziness, noise exposure, and fam hx of HL. Results: Non- occluding cerumen, bilat. R: Nroaml 250- 2000 Hz, slopinkg to moderate- severe SNHL for 3- 8 KHz. L: Normal 250- 750 Hz, sloping to mild to mod- severe SNHL for 1- 8 KHz. Results stable, bilat. , per 2- 6- 20 audiogram. Excellent word rec ability in quiet, bilat. Normal tymps, bilat. Ipsi 1 KHz acoustic reflexes absent, bilat. , but present in contra condition in both ears.     7/16 PRIMARY CARE PROVIDER note-  3. Abnormality of gait due to impairment of balance  Patient has poor balance - her consultants have told her she should check in with ENT - will offer referral.  "    Imbalance is due to BPPV - BP changes ??  Patient has written down paroxysmal as a diagnosis (discussed this is a descriptor, not a diagnosis)  Chiro thinks her balance should be evaluated by ENT - patient doesn't want to say \"dizzy\", but has to walk to keep her balance      Normal for age brain MRI last year.      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  Sitting up and standing up can make it happen.  No vertigo but \" feels like something isn't correcting right in her ears.\"  Never had vertigo.  Chiro has been checking orthostatics and those are ok.  She has seen a cardiologist in the past.        Review of Systems   ENT as above      Objective    LMP  (LMP Unknown)     Physical Exam     Constitutional:   The patient was in no acute distress.      Head/Face:   Normocephalic and atraumatic.  No lesions or scars.     Ears:  The tympanic membranes are normal in appearance, bony landmarks are intact.  No retraction, perforation, or masses.   No fluid or purulence was seen in the external canal or the middle ear. No evidence of infection of the middle ear or external canal, cerumen was normal in appearance.    Nose:  Anterior rhinoscopy revealed midline septum and absence of purulence or polyps.      Mouth:  Normal tongue, floor of mouth, buccal mucosa, and palate.  No lesions, ulceration or  masses on inspection, normal voice quality      Oropharynx:  Normal mucosa, palate symmetric with normal elevation. Tonsils  not visualized       Neck:  Supple with normal laryngeal and tracheal landmarks. No palpable thyroid.     Lymphatic:  There is no palpable lymphadenopathy in the neck.         Neuro: Alert and oriented - normal speech. Cranial nerves 2-12 intact.  Normal strength. Using extremities freely.  Finger to nose normal.  Heel to shin nomral.   Balance evaluation-  Gait and Station Normal.  Mild / mod  en bloc turning.  Romberg Negative.    The patient was able to stand independently in the exam " room without support or assistance.             Again, thank you for allowing me to participate in the care of your patient.        Sincerely,        TRESA Headley

## 2024-09-02 ASSESSMENT — ENCOUNTER SYMPTOMS
HEMATURIA: 0
FEVER: 0
EYE PAIN: 0
COLOR CHANGE: 0
BACK PAIN: 0
SEIZURES: 0
DYSURIA: 0
CHILLS: 0
NERVOUS/ANXIOUS: 1
SORE THROAT: 0
VOMITING: 0
COUGH: 0
SHORTNESS OF BREATH: 0
ABDOMINAL PAIN: 0
CONSTIPATION: 1
ARTHRALGIAS: 0
PALPITATIONS: 0

## 2024-09-16 ENCOUNTER — OFFICE VISIT (OUTPATIENT)
Dept: AUDIOLOGY | Facility: CLINIC | Age: 74
End: 2024-09-16
Attending: PHYSICIAN ASSISTANT
Payer: COMMERCIAL

## 2024-09-16 DIAGNOSIS — R26.89 IMBALANCE: ICD-10-CM

## 2024-09-16 DIAGNOSIS — R42 DIZZINESS AND GIDDINESS: Primary | ICD-10-CM

## 2024-09-16 DIAGNOSIS — H90.3 SENSORINEURAL HEARING LOSS, BILATERAL: ICD-10-CM

## 2024-09-16 PROCEDURE — 92550 TYMPANOMETRY & REFLEX THRESH: CPT | Mod: 52 | Performed by: AUDIOLOGIST

## 2024-09-16 PROCEDURE — 92557 COMPREHENSIVE HEARING TEST: CPT | Performed by: AUDIOLOGIST

## 2024-09-16 NOTE — PROGRESS NOTES
AUDIOLOGY REPORT    SUMMARY: Audiology visit completed. See audiogram for results.      RECOMMENDATIONS: Follow-up with ENT.    Ralf Birch, CCC-A  [place] Licensed Audiologist #[id number]

## 2024-09-20 ENCOUNTER — OFFICE VISIT (OUTPATIENT)
Dept: PHYSICAL MEDICINE AND REHAB | Facility: CLINIC | Age: 74
End: 2024-09-20
Payer: COMMERCIAL

## 2024-09-20 VITALS
SYSTOLIC BLOOD PRESSURE: 110 MMHG | HEART RATE: 80 BPM | HEIGHT: 65 IN | BODY MASS INDEX: 20.99 KG/M2 | OXYGEN SATURATION: 96 % | DIASTOLIC BLOOD PRESSURE: 66 MMHG | WEIGHT: 126 LBS

## 2024-09-20 DIAGNOSIS — G62.9 PERIPHERAL POLYNEUROPATHY: ICD-10-CM

## 2024-09-20 DIAGNOSIS — G89.29 CHRONIC LEFT-SIDED LOW BACK PAIN WITHOUT SCIATICA: Primary | ICD-10-CM

## 2024-09-20 DIAGNOSIS — M47.816 LUMBAR SPONDYLOSIS: ICD-10-CM

## 2024-09-20 DIAGNOSIS — M54.50 CHRONIC LEFT-SIDED LOW BACK PAIN WITHOUT SCIATICA: Primary | ICD-10-CM

## 2024-09-20 PROCEDURE — 99213 OFFICE O/P EST LOW 20 MIN: CPT | Performed by: STUDENT IN AN ORGANIZED HEALTH CARE EDUCATION/TRAINING PROGRAM

## 2024-09-20 ASSESSMENT — PAIN SCALES - GENERAL: PAINLEVEL: NO PAIN (0)

## 2024-09-20 NOTE — PROGRESS NOTES
ASSESSMENT:  Cliff Dai is a 74-year-old female presents for follow-up of :         Diagnoses and all orders for this visit:  Chronic left-sided low back pain without sciatica  Lumbar spondylosis  Peripheral polyneuropathy         Patient is neurologically intact on exam. No myelopathic or red flag symptoms.    Patient presents for follow-up and EMG review related to her chronic left-sided low back pain and symmetric peripheral polyneuropathy.  We reviewed patient's EMG finding which was classified as an essentially normal study, with low amplitude bilateral sural nerve potentials which could be normal age-related findings versus a mild sensory neuropathy.  As far as her low back goes, could be coming from lower lumbosacral facets versus SI joint.  We discussed conservative or interventional treatment options, and as the pain has not debilitating or severe for the patient and she would like to continue with conservative management at this stage.  We discussed incorporating topical NSAID such as Voltaren gel into her treatment plan and patient already has this medication so she will start using it.  Follow-up with us as needed in the future if pain is worsening or changing in nature.    PLAN:  Reviewed spine anatomy and disease process. Discussed diagnosis and treatment options with the patient today. A shared decision making model was used. The patient's values and choices were respected. The following represents what was discussed and decided upon by the provider and the patient.    1. DIAGNOSTIC TESTS  No new imaging orders at this time.    2. PHYSICAL THERAPY  Patient sees a chiropractor regularly.  Patient also does a daily exercise program under the supervision of a   Discussed the importance of core strengthening, ROM, stretching exercises with the patient and how each of these entities is important in decreasing pain.  Explained to the patient that the purpose of physical therapy is to  teach the patient a home exercise program.  These exercises need to be performed every day in order to decrease pain and prevent future occurrences of pain.  Likened it to brushing one's teeth.      3. MEDICATIONS:  Discussed multiple medication options today with patient. Discussed risks, side effects, and proper use of medications. Patient verbalized understanding.  No new medications ordered at this visit.  Discussed using Voltaren gel which is an over-the-counter topical NSAID    4. INTERVENTIONS:  Patient requires further imaging to assess what interventional options may be best for them.    5. OTHER REFERRALS:  No other referrals at this time.    6. FOLLOW-UP  As needed.    Advised patient to call the [facility] if symptoms worsen or you have problems controlling bladder and bowel function.   ______________________________________________________________________    SUBJECTIVE:   Cliff Dai  is a 74year old female who presents today for follow-up evaluation.    Patient reports overall stable presentation.  Pain continues to be in the lower back on the left side primarily, in the area of the lumbosacral facets and possibly the left SI joint as well.  Pain has been managed thus far with conservative treatment and patient works with an  twice a week for strengthening and conditioning of her back and hips.  Denies any new radicular symptoms, no new numbness, weakness, or bladder bowel incontinence.  Continues to have tingling and paresthesias symmetrically in bilateral legs from the knees down and this has not changed either.      No prior back surgeries    -Treatment to Date: As above      Oswestry (YESENIA) Questionnaire        6/28/2024    10:45 AM   OSWESTRY DISABILITY INDEX   Count 9   Sum 8   Oswestry Score (%) 17.78 %       Neck Disability Index:      6/28/2024    10:50 AM   Neck Disability Index (  Feliciano H. [employer]ce[employer] C. 1991. All rights reserved.; used with permission)   SECTION 1 - PAIN  INTENSITY 2   SECTION 2 - PERSONAL CARE 0   SECTION 3 - LIFTING 3   SECTION 4 - READING 0   SECTION 5 - HEADACHES 3   SECTION 6 - CONCENTRATION 1   SECTION 7 - WORK 1   SECTION 8 - DRIVING 0   SECTION 9 - SLEEPING 0   SECTION 10 - RECREATION 0   Count 10   Sum 10   Raw Score: /50 10   Neck Disability Index Score: (%) 20 %              -Medications:    Current Outpatient Medications   Medication Sig Dispense Refill    acetaminophen (TYLENOL) 500 MG tablet Take 1,000 mg by mouth 2 times daily as needed for mild pain      cetirizine (ZYRTEC) 10 MG tablet Take 10 mg by mouth.      cycloSPORINE (RESTASIS) 0.05 % ophthalmic emulsion Place 1 drop into both eyes 2 times daily       Current Facility-Administered Medications   Medication Dose Route Frequency Provider Last Rate Last Admin    respiratory syncytial virus vaccine, bivalent (ABRYSVO) injection 0.5 mL  0.5 mL Intramuscular Once Hyun Mcgovern MD           Allergies   Allergen Reactions    Cephalexin Itching    Glucose GI Disturbance     Gas    Lactose GI Disturbance     Gas    Other Environmental Allergy      PN: LW CM1: >>> NO CONTRAST ADVERSE REACTION <<<     Reaction :  PN: LW CM1: >>> NO CONTRAST ADVERSE REACTION <<<     Reaction :       Past Medical History:   Diagnosis Date    Arthritis     OSTEOARTHRITIS BOTH SHOULDERS    Osteoporosis     Peripheral neuropathy     Sjogren's syndrome (H24)     Stress incontinence         Patient Active Problem List   Diagnosis    Presence of artificial shoulder joint    Hip pain, left    Bilateral shoulder region arthritis    Cervicalgia    Edema    Excessive thirst    Female stress incontinence    Hyperglycemia    Hypokalemia    Inflammation of joint of shoulder region    Low back pain    Osteoarthritis of both shoulders    Osteoporosis    Pain in joint, shoulder region    Primary osteoarthritis of left hip    Prolapse of vaginal wall    Greater trochanteric bursitis of left hip    Sjogren's syndrome (H24)     "Dermatitis    Symptomatic menopausal or female climacteric states    Urticaria    Vertigo    Vitamin D deficiency    Peripheral nerve disease    Sicca syndrome (H24)    Sciatica    Memory loss       Past Surgical History:   Procedure Laterality Date    APPENDECTOMY  2008    ARTHROPLASTY SHOULDER Left 2/1/2017    Procedure: ARTHROPLASTY SHOULDER;  Surgeon: Lui Gonzalez MD;  [facility]    CHOLECYSTECTOMY  2008    GYN SURGERY  2012    VAG HYST, OOPHORECTOMY    GYN SURGERY  2008    ANTERIOR COLPORRHAPHY, REPAIR CYSTOCELE    [facility] ANTER COLPORRHAPHY,BLAD/VAGINA      Description: Anterior Colporrhaphy, Repair Of Cystocele;  Recorded: 02/25/2008;    [facility] REMOVAL GALLBLADDER      Description: Cholecystectomy;  Recorded: 10/28/2008;    HYSTERECTOMY  2004    OOPHORECTOMY Bilateral 2004    [place] VAGINAL HYSTERECTOMY,UTERUS 250 GMS/<      Description: Vaginal Hysterectomy;  Recorded: 02/25/2012;  Comments: for hypermenorrhea/ prolapse; Dr. Octavio LYNN APPENDECTOMY      Description: Appendectomy;  Recorded: 10/28/2008;       Family History   Problem Relation Age of Onset    Breast Cancer Mother 57.00    Breast Cancer Maternal Grandmother         Unsure of age       Reviewed past medical, surgical, and family history with patient found on new patient intake packet located in EMR Media tab.     SOCIAL HX: Reviewed    ROS: Specifically negative for bowel/bladder dysfunction, balance changes, headache, dizziness, foot drop, fevers, chills, appetite changes, nausea/vomiting, unexplained weight loss. Otherwise 13 systems reviewed are negative. Please see the patient's intake questionnaire from today for details.    OBJECTIVE:  /66 (BP Location: Left arm, Patient Position: Sitting, Cuff Size: Adult Regular)   Pulse 80   Ht 5' 4.5\" (1.638 m)   Wt 126 lb (57.2 kg)   LMP  (LMP Unknown)   SpO2 96%   BMI 21.29 kg/m      PHYSICAL EXAMINATION: Prior exam reviewed and updated as needed  --CONSTITUTIONAL: Vital signs as " above. No acute distress. The patient is well nourished and well groomed.  --PSYCHIATRIC: The patient is awake, alert, oriented to person, place, time and answering questions appropriately with clear speech. Appropriate mood and affect   --RESPIRATORY: Normal rhythm and effort. No abnormal accessory muscle breathing patterns noted.   --GROSS MOTOR: Easily arises from a seated position.  --LUMBAR SPINE: Inspection reveals no evidence of deformity. Range of motion is not limited in flexion, extension, lateral rotation. Mild tenderness to palpation of lumbar paraspinals, no tenderness in spinous processes.  Facet loading (Jade test) negative, seated SLR negative  --HIPS: Full range of motion bilaterally.  --LOWER EXTREMITY MOTOR TESTING:  Hip flexion left 5/5, right 5/5  Knee extension left 5/5, right 5/5  Ankle dorsiflexors left 5/5, right 5/5  Ankle plantarflexors left 5/5, right 5/5   Great toe extension left 5/5, right 5/5   Knee flexion left 5/5, right 5/5  --NEUROLOGIC: Sensation to lower extremities is intact bilaterally in L2-S1 dermatomes.  Negative Espinoza's bilaterally. Reflexes intact in BLE, no clonus.  --VASCULAR: Warm upper limbs bilaterally. Capillary refill in the upper extremities is less than 1 second.    RESULTS: Available medical records from [facility] and any other outside records were reviewed today.     Imaging:  Available relevant imaging was personally reviewed and interpreted today. The images were shown to the patient and the findings were explained using a spine model.    EMG Griffin Lower Ext 8/23/24:  Interpretation: Essentially normal study: There is electrodiagnostic evidence of:     1.  Low amplitude or absent bilateral sural sensory nerve action potentials.  These findings can be normal for the patient's age, but I cannot completely exclude a very mild sensory or sensorimotor peripheral polyneuropathy.       2.  There is no electrodiagnostic evidence of lumbosacral radiculopathy,  lumbosacral plexopathy, or focal neuropathy in the bilateral lower extremities.    MRI Lumbar Spine 7/23/24:  IMPRESSION:  1.  Multilevel degenerative changes in the lumbar spine as above have progressed/worsened from 1/6/2016 notably at mid lumbar levels. Progressive interspace narrowing and low-grade degenerative retrolistheses has developed from prior study.     2.  Slight increased size of disc herniation/disc bulging from prior study as well as for aggressive facet joint arthropathy given above.     3.  There is no severe spinal stenosis present with multilevel mild spinal canal narrowing. Multilevel foraminal narrowing mild-moderate in degree noted progressed from prior studies including at the left L3-4 level. Modic type I endplate signal changes   about the narrowed interspace L1-2 involving the inferior endplate L1 can serve as an independent source of pain generation with associated small Schmorl's node endplate deformity seen series 4 image 11. Multilevel Modic type II endplate signal changes   noted about additional narrowed lumbar interspaces. Nothing for a marrow-infiltrative process.    XR Lumbar Spine 2/3 Views    Result Date: 6/6/2024  EXAM: XR LUMBAR SPINE 2/3 VIEWS LOCATION: [facility] DATE: 6/5/2024 INDICATION: low back pain    left sided   no sciatica COMPARISON: Lumbar spine MRI: 1/6/2016.     IMPRESSION: 1.  No radiographic evidence of acute fracture involving the lumbar spine. Vertebral body heights are maintained. 2.  Lumbar dextrocurvature with apex at L3-L4. Retrolisthesis of L3 on L4 measuring approximately 4 mm. 8 mm leftward translation of L2 on L3. 3.  Multilevel degenerative disc disease throughout the lumbar spine with disc height loss greatest and advanced on the left at L2-L3 and at L4-L5. Multilevel facet arthropathy. 4.  No focal extraspinal soft tissue abnormality is evident.

## 2024-09-20 NOTE — LETTER
9/20/2024      Cliff Dai  [address]  [address]  [place] [address]      Dear Colleague,    Thank you for referring your patient, Cliff Dai, to the [facility]. Please see a copy of my visit note below.    ASSESSMENT:  Cliff Dai is a 74-year-old female presents for follow-up of :         Diagnoses and all orders for this visit:  Chronic left-sided low back pain without sciatica  Lumbar spondylosis  Peripheral polyneuropathy         Patient is neurologically intact on exam. No myelopathic or red flag symptoms.    Patient presents for follow-up and EMG review related to her chronic left-sided low back pain and symmetric peripheral polyneuropathy.  We reviewed patient's EMG finding which was classified as an essentially normal study, with low amplitude bilateral sural nerve potentials which could be normal age-related findings versus a mild sensory neuropathy.  As far as her low back goes, could be coming from lower lumbosacral facets versus SI joint.  We discussed conservative or interventional treatment options, and as the pain has not debilitating or severe for the patient and she would like to continue with conservative management at this stage.  We discussed incorporating topical NSAID such as Voltaren gel into her treatment plan and patient already has this medication so she will start using it.  Follow-up with us as needed in the future if pain is worsening or changing in nature.    PLAN:  Reviewed spine anatomy and disease process. Discussed diagnosis and treatment options with the patient today. A shared decision making model was used. The patient's values and choices were respected. The following represents what was discussed and decided upon by the provider and the patient.    1. DIAGNOSTIC TESTS  No new imaging orders at this time.    2. PHYSICAL THERAPY  Patient sees a chiropractor regularly.  Patient also does a daily exercise program under the supervision of a    Discussed the importance of core strengthening, ROM, stretching exercises with the patient and how each of these entities is important in decreasing pain.  Explained to the patient that the purpose of physical therapy is to teach the patient a home exercise program.  These exercises need to be performed every day in order to decrease pain and prevent future occurrences of pain.  Likened it to brushing one's teeth.      3. MEDICATIONS:  Discussed multiple medication options today with patient. Discussed risks, side effects, and proper use of medications. Patient verbalized understanding.  No new medications ordered at this visit.  Discussed using Voltaren gel which is an over-the-counter topical NSAID    4. INTERVENTIONS:  Patient requires further imaging to assess what interventional options may be best for them.    5. OTHER REFERRALS:  No other referrals at this time.    6. FOLLOW-UP  As needed.    Advised patient to call the [facility] if symptoms worsen or you have problems controlling bladder and bowel function.   ______________________________________________________________________    SUBJECTIVE:   Cliff Dai  is a 74year old female who presents today for follow-up evaluation.    Patient reports overall stable presentation.  Pain continues to be in the lower back on the left side primarily, in the area of the lumbosacral facets and possibly the left SI joint as well.  Pain has been managed thus far with conservative treatment and patient works with an  twice a week for strengthening and conditioning of her back and hips.  Denies any new radicular symptoms, no new numbness, weakness, or bladder bowel incontinence.  Continues to have tingling and paresthesias symmetrically in bilateral legs from the knees down and this has not changed either.      No prior back surgeries    -Treatment to Date: As above      Oswestry (YESENIA) Questionnaire        6/28/2024    10:45 AM    OSWESTRY DISABILITY INDEX   Count 9   Sum 8   Oswestry Score (%) 17.78 %       Neck Disability Index:      6/28/2024    10:50 AM   Neck Disability Index (  Feliciano H. and [employer]ce[employer]. 1991. All rights reserved.; used with permission)   SECTION 1 - PAIN INTENSITY 2   SECTION 2 - PERSONAL CARE 0   SECTION 3 - LIFTING 3   SECTION 4 - READING 0   SECTION 5 - HEADACHES 3   SECTION 6 - CONCENTRATION 1   SECTION 7 - WORK 1   SECTION 8 - DRIVING 0   SECTION 9 - SLEEPING 0   SECTION 10 - RECREATION 0   Count 10   Sum 10   Raw Score: /50 10   Neck Disability Index Score: (%) 20 %              -Medications:    Current Outpatient Medications   Medication Sig Dispense Refill     acetaminophen (TYLENOL) 500 MG tablet Take 1,000 mg by mouth 2 times daily as needed for mild pain       cetirizine (ZYRTEC) 10 MG tablet Take 10 mg by mouth.       cycloSPORINE (RESTASIS) 0.05 % ophthalmic emulsion Place 1 drop into both eyes 2 times daily       Current Facility-Administered Medications   Medication Dose Route Frequency Provider Last Rate Last Admin     respiratory syncytial virus vaccine, bivalent (ABRYSVO) injection 0.5 mL  0.5 mL Intramuscular Once Hyun Mcgovern MD           Allergies   Allergen Reactions     Cephalexin Itching     Glucose GI Disturbance     Gas     Lactose GI Disturbance     Gas     Other Environmental Allergy      PN: LW CM1: >>> NO CONTRAST ADVERSE REACTION <<<     Reaction :  PN: LW CM1: >>> NO CONTRAST ADVERSE REACTION <<<     Reaction :       Past Medical History:   Diagnosis Date     Arthritis     OSTEOARTHRITIS BOTH SHOULDERS     Osteoporosis      Peripheral neuropathy      Sjogren's syndrome (H24)      Stress incontinence         Patient Active Problem List   Diagnosis     Presence of artificial shoulder joint     Hip pain, left     Bilateral shoulder region arthritis     Cervicalgia     Edema     Excessive thirst     Female stress incontinence     Hyperglycemia     Hypokalemia      Inflammation of joint of shoulder region     Low back pain     Osteoarthritis of both shoulders     Osteoporosis     Pain in joint, shoulder region     Primary osteoarthritis of left hip     Prolapse of vaginal wall     Greater trochanteric bursitis of left hip     Sjogren's syndrome (H24)     Dermatitis     Symptomatic menopausal or female climacteric states     Urticaria     Vertigo     Vitamin D deficiency     Peripheral nerve disease     Sicca syndrome (H24)     Sciatica     Memory loss       Past Surgical History:   Procedure Laterality Date     APPENDECTOMY  2008     ARTHROPLASTY SHOULDER Left 2/1/2017    Procedure: ARTHROPLASTY SHOULDER;  Surgeon: Lui Gonzalez MD;  [facility]     CHOLECYSTECTOMY  2008     GYN SURGERY  2012    VAG HYST, OOPHORECTOMY     GYN SURGERY  2008    ANTERIOR COLPORRHAPHY, REPAIR CYSTOCELE     HC ANTER COLPORRHAPHY,BLAD/VAGINA      Description: Anterior Colporrhaphy, Repair Of Cystocele;  Recorded: 02/25/2008;     [facility] REMOVAL GALLBLADDER      Description: Cholecystectomy;  Recorded: 10/28/2008;     HYSTERECTOMY  2004     OOPHORECTOMY Bilateral 2004     [place] VAGINAL HYSTERECTOMY,UTERUS 250 GMS/<      Description: Vaginal Hysterectomy;  Recorded: 02/25/2012;  Comments: for hypermenorrhea/ prolapse; Dr. Octavio LYNN APPENDECTOMY      Description: Appendectomy;  Recorded: 10/28/2008;       Family History   Problem Relation Age of Onset     Breast Cancer Mother 57.00     Breast Cancer Maternal Grandmother         Unsure of age       Reviewed past medical, surgical, and family history with patient found on new patient intake packet located in EMR Media tab.     SOCIAL HX: Reviewed    ROS: Specifically negative for bowel/bladder dysfunction, balance changes, headache, dizziness, foot drop, fevers, chills, appetite changes, nausea/vomiting, unexplained weight loss. Otherwise 13 systems reviewed are negative. Please see the patient's intake questionnaire from today for  "details.    OBJECTIVE:  /66 (BP Location: Left arm, Patient Position: Sitting, Cuff Size: Adult Regular)   Pulse 80   Ht 5' 4.5\" (1.638 m)   Wt 126 lb (57.2 kg)   LMP  (LMP Unknown)   SpO2 96%   BMI 21.29 kg/m      PHYSICAL EXAMINATION: Prior exam reviewed and updated as needed  --CONSTITUTIONAL: Vital signs as above. No acute distress. The patient is well nourished and well groomed.  --PSYCHIATRIC: The patient is awake, alert, oriented to person, place, time and answering questions appropriately with clear speech. Appropriate mood and affect   --RESPIRATORY: Normal rhythm and effort. No abnormal accessory muscle breathing patterns noted.   --GROSS MOTOR: Easily arises from a seated position.  --LUMBAR SPINE: Inspection reveals no evidence of deformity. Range of motion is not limited in flexion, extension, lateral rotation. Mild tenderness to palpation of lumbar paraspinals, no tenderness in spinous processes.  Facet loading (Jade test) negative, seated SLR negative  --HIPS: Full range of motion bilaterally.  --LOWER EXTREMITY MOTOR TESTING:  Hip flexion left 5/5, right 5/5  Knee extension left 5/5, right 5/5  Ankle dorsiflexors left 5/5, right 5/5  Ankle plantarflexors left 5/5, right 5/5   Great toe extension left 5/5, right 5/5   Knee flexion left 5/5, right 5/5  --NEUROLOGIC: Sensation to lower extremities is intact bilaterally in L2-S1 dermatomes.  Negative Espinoza's bilaterally. Reflexes intact in BLE, no clonus.  --VASCULAR: Warm upper limbs bilaterally. Capillary refill in the upper extremities is less than 1 second.    RESULTS: Available medical records from [facility] and any other outside records were reviewed today.     Imaging:  Available relevant imaging was personally reviewed and interpreted today. The images were shown to the patient and the findings were explained using a spine model.    EMG Griffin Lower Ext 8/23/24:  Interpretation: Essentially normal study: There is " electrodiagnostic evidence of:     1.  Low amplitude or absent bilateral sural sensory nerve action potentials.  These findings can be normal for the patient's age, but I cannot completely exclude a very mild sensory or sensorimotor peripheral polyneuropathy.       2.  There is no electrodiagnostic evidence of lumbosacral radiculopathy, lumbosacral plexopathy, or focal neuropathy in the bilateral lower extremities.    MRI Lumbar Spine 7/23/24:  IMPRESSION:  1.  Multilevel degenerative changes in the lumbar spine as above have progressed/worsened from 1/6/2016 notably at mid lumbar levels. Progressive interspace narrowing and low-grade degenerative retrolistheses has developed from prior study.     2.  Slight increased size of disc herniation/disc bulging from prior study as well as for aggressive facet joint arthropathy given above.     3.  There is no severe spinal stenosis present with multilevel mild spinal canal narrowing. Multilevel foraminal narrowing mild-moderate in degree noted progressed from prior studies including at the left L3-4 level. Modic type I endplate signal changes   about the narrowed interspace L1-2 involving the inferior endplate L1 can serve as an independent source of pain generation with associated small Schmorl's node endplate deformity seen series 4 image 11. Multilevel Modic type II endplate signal changes   noted about additional narrowed lumbar interspaces. Nothing for a marrow-infiltrative process.    XR Lumbar Spine 2/3 Views    Result Date: 6/6/2024  EXAM: XR LUMBAR SPINE 2/3 VIEWS LOCATION: [facility] DATE: 6/5/2024 INDICATION: low back pain    left sided   no sciatica COMPARISON: Lumbar spine MRI: 1/6/2016.     IMPRESSION: 1.  No radiographic evidence of acute fracture involving the lumbar spine. Vertebral body heights are maintained. 2.  Lumbar dextrocurvature with apex at L3-L4. Retrolisthesis of L3 on L4 measuring approximately 4 mm. 8 mm leftward  translation of L2 on L3. 3.  Multilevel degenerative disc disease throughout the lumbar spine with disc height loss greatest and advanced on the left at L2-L3 and at L4-L5. Multilevel facet arthropathy. 4.  No focal extraspinal soft tissue abnormality is evident.      Again, thank you for allowing me to participate in the care of your patient.        Sincerely,        Jadon Chavez MD

## 2024-09-30 ENCOUNTER — OFFICE VISIT (OUTPATIENT)
Dept: CARDIOLOGY | Facility: CLINIC | Age: 74
End: 2024-09-30
Attending: INTERNAL MEDICINE
Payer: COMMERCIAL

## 2024-09-30 VITALS
BODY MASS INDEX: 21.8 KG/M2 | OXYGEN SATURATION: 99 % | RESPIRATION RATE: 18 BRPM | SYSTOLIC BLOOD PRESSURE: 108 MMHG | WEIGHT: 129 LBS | HEART RATE: 70 BPM | DIASTOLIC BLOOD PRESSURE: 54 MMHG

## 2024-09-30 DIAGNOSIS — R60.0 BILATERAL LEG EDEMA: ICD-10-CM

## 2024-09-30 DIAGNOSIS — I25.9 CHEST PAIN DUE TO MYOCARDIAL ISCHEMIA, UNSPECIFIED ISCHEMIC CHEST PAIN TYPE: Primary | ICD-10-CM

## 2024-09-30 PROCEDURE — 99213 OFFICE O/P EST LOW 20 MIN: CPT | Performed by: INTERNAL MEDICINE

## 2024-09-30 NOTE — PROGRESS NOTES
Click to link to [facility] HEART CARE NOTE       Assessment/Plan:   Chest pain: The patient states that her chest pain was resolved.  No shortness of breath on exertion, no leg edema.  Her coronary CT angiogram was reported 0 coronary calcium score, no coronary artery disease.  Echocardiogram was reported normal heart function and structure.  Her chest pain is noncardiac.  No indication of further cardiac evaluation.  Continue lifestyle modification.    Thank you for the opportunity to be involved in the care of Cliff Dai. If you have any questions, please feel free to contact me.  I will see the patient again as needed    Much or all of the text in this note was generated through the use of Dragon Dictate voice-to-text software. Errors in spelling or words which seem out of context are unintentional.   Sound alike errors, in particular, may have escaped editing.       History of Present Illness:   It is my pleasure to see Cliff Dai at the [facility] clinic for discussing coronary CT angiogram and echo reports. Cliff Dai is a 74 year old female with a medical history of memory loss, family history of coronary artery disease.    The patient states that her chest pain was resolved.  She has no shortness of breath on exertion.  She did not have palpitations, orthopnea, PND.  She has intermittent lightheadedness.  Her leg edema was resolved.  Her blood pressure and heart rate are in normal range.    Past Medical History:     Patient Active Problem List   Diagnosis    Presence of artificial shoulder joint    Hip pain, left    Bilateral shoulder region arthritis    Cervicalgia    Edema    Excessive thirst    Female stress incontinence    Hyperglycemia    Hypokalemia    Inflammation of joint of shoulder region    Low back pain    Osteoarthritis of both shoulders    Osteoporosis    Pain in joint, shoulder region    Primary osteoarthritis of left hip     Prolapse of vaginal wall    Greater trochanteric bursitis of left hip    Sjogren's syndrome (H)    Dermatitis    Symptomatic menopausal or female climacteric states    Urticaria    Vertigo    Vitamin D deficiency    Peripheral nerve disease    Sicca syndrome (H)    Sciatica    Memory loss       Past Surgical History:     Past Surgical History:   Procedure Laterality Date    APPENDECTOMY  2008    ARTHROPLASTY SHOULDER Left 2/1/2017    Procedure: ARTHROPLASTY SHOULDER;  Surgeon: Lui Gonzalez MD;  Location: [facility]    CHOLECYSTECTOMY  2008    GYN SURGERY  2012    VAG HYST, OOPHORECTOMY    GYN SURGERY  2008    ANTERIOR COLPORRHAPHY, REPAIR CYSTOCELE    HC ANTER COLPORRHAPHY,BLAD/VAGINA      Description: Anterior Colporrhaphy, Repair Of Cystocele;  Recorded: 02/25/2008;    HC REMOVAL GALLBLADDER      Description: Cholecystectomy;  Recorded: 10/28/2008;    HYSTERECTOMY  2004    OOPHORECTOMY Bilateral 2004    [place] VAGINAL HYSTERECTOMY,UTERUS 250 GMS/<      Description: Vaginal Hysterectomy;  Recorded: 02/25/2012;  Comments: for hypermenorrhea/ prolapse; Dr. Octavio LYNN APPENDECTOMY      Description: Appendectomy;  Recorded: 10/28/2008;       Family History:     Family History   Problem Relation Age of Onset    Breast Cancer Mother 57.00    Breast Cancer Maternal Grandmother         Unsure of age       Social History:    reports that she quit smoking about 44 years ago. Her smoking use included cigarettes. She has never used smokeless tobacco. She reports current alcohol use. She reports that she does not use drugs.    Review of Systems:   12 systems are reviewed negative except for in HPI.    Meds:     Current Outpatient Medications:     acetaminophen (TYLENOL) 500 MG tablet, Take 1,000 mg by mouth 2 times daily as needed for mild pain, Disp: , Rfl:     cetirizine (ZYRTEC) 10 MG tablet, Take 10 mg by mouth daily., Disp: , Rfl:     cycloSPORINE (RESTASIS) 0.05 % ophthalmic emulsion, Place 1 drop into both eyes 2  times daily, Disp: , Rfl:     Current Facility-Administered Medications:     respiratory syncytial virus vaccine, bivalent (ABRYSVO) injection 0.5 mL, 0.5 mL, Intramuscular, Once, Hyun Mcgovern MD     Allergies:   Cephalexin, Glucose, Lactose, and Other environmental allergy    Objective:      Physical Exam  58.5 kg (129 lb)     Body mass index is 21.8 kg/m .  /54 (BP Location: Left arm, Patient Position: Sitting, Cuff Size: Adult Regular)   Pulse 70   Resp 18   Wt 58.5 kg (129 lb)   LMP  (LMP Unknown)   SpO2 99%   BMI 21.80 kg/m      General Appearance:   Awake, Alert, No acute distress.   HEENT:  Pupil equal, reactive to light. No scleral icterus; the mucous membranes were moist. No oral ulcers or thrush.    Neck: No cervical bruits. No JVD. No thyromegaly. No lymph node enlargement or tenderness.   Chest: The spine was straight. The chest was symmetric.   Lungs:   Respirations unlabored. Lungs are clear to auscultation. No crackles. No wheezing.   Cardiovascular:   RRR, normal first and second heart sounds with no murmurs. No rubs or gallops.    Abdomen:  Soft. No tenderness. Non-distended. Bowels sounds are present   Extremities: Equal posterior tibial pulses. No leg edema.   Skin: No rashes or ulcers. Warm, Dry.   Musculoskeletal: No tenderness. No deformity.   Neurologic: Mood and affect are appropriate. No focal deficits.         EKG:  Personally reivewed  Normal sinus rhythm  Dynamic nonspecific T wave abnormality    Cardiac Imaging Studies  Echo on August 15, 2024:  Left ventricular size, wall motion and function are normal. The ejection  fraction is 55-60%.  Normal right ventricle size and systolic function.  No hemodynamically significant valvular abnormalities.    Coronary CT angiogram on August 15, 2024:    No evidence for coronary artery disease.    Calcium Scoring: The total Agatston score is 0. A calcium score of zero places the individual in the lowest quartile when  compared to an age and gender matched control group and implies a low risk of cardiac events in the next 10 years. (less than 1% per year).    Lab Review   Lab Results   Component Value Date     07/10/2023     02/04/2017    CO2 25 07/10/2023    CO2 24 04/12/2022    CO2 29 02/04/2017    BUN 16.4 07/10/2023    BUN 19 04/12/2022    BUN 4 02/04/2017     Lab Results   Component Value Date    WBC 5.5 07/10/2023    HGB 12.6 07/10/2023    HGB 9.7 02/03/2017    HCT 38.5 07/10/2023     07/10/2023     07/10/2023     Lab Results   Component Value Date    CHOL 169 11/23/2022    TRIG 84 11/23/2022    HDL 63 11/23/2022    LDL 89 11/23/2022     LDL Cholesterol Calculated   Date Value Ref Range Status   11/23/2022 89 <=100 mg/dL Final     Lab Results   Component Value Date    TROPONINI <0.01 03/02/2021     Lab Results   Component Value Date    TSH 2.31 03/15/2023    TSH 1.69 03/02/2021

## 2024-09-30 NOTE — LETTER
9/30/2024    MARIUM KRUEGER MD  [address] [place] [id number]    RE: Cliff Dai       Dear Colleague,     I had the pleasure of seeing Cliff Dai in the [facility].      Click to link to [facility] HEART CARE NOTE       Assessment/Plan:   Chest pain: The patient states that her chest pain was resolved.  No shortness of breath on exertion, no leg edema.  Her coronary CT angiogram was reported 0 coronary calcium score, no coronary artery disease.  Echocardiogram was reported normal heart function and structure.  Her chest pain is noncardiac.  No indication of further cardiac evaluation.  Continue lifestyle modification.    Thank you for the opportunity to be involved in the care of Cliff Dai. If you have any questions, please feel free to contact me.  I will see the patient again as needed    Much or all of the text in this note was generated through the use of Dragon Dictate voice-to-text software. Errors in spelling or words which seem out of context are unintentional.   Sound alike errors, in particular, may have escaped editing.       History of Present Illness:   It is my pleasure to see Cliff Dai at the [facility] for discussing coronary CT angiogram and echo reports. Cliff Dai is a 74 year old female with a medical history of memory loss, family history of coronary artery disease.    The patient states that her chest pain was resolved.  She has no shortness of breath on exertion.  She did not have palpitations, orthopnea, PND.  She has intermittent lightheadedness.  Her leg edema was resolved.  Her blood pressure and heart rate are in normal range.    Past Medical History:     Patient Active Problem List   Diagnosis     Presence of artificial shoulder joint     Hip pain, left     Bilateral shoulder region arthritis     Cervicalgia     Edema     Excessive thirst     Female stress incontinence      Hyperglycemia     Hypokalemia     Inflammation of joint of shoulder region     Low back pain     Osteoarthritis of both shoulders     Osteoporosis     Pain in joint, shoulder region     Primary osteoarthritis of left hip     Prolapse of vaginal wall     Greater trochanteric bursitis of left hip     Sjogren's syndrome (H)     Dermatitis     Symptomatic menopausal or female climacteric states     Urticaria     Vertigo     Vitamin D deficiency     Peripheral nerve disease     Sicca syndrome (H)     Sciatica     Memory loss       Past Surgical History:     Past Surgical History:   Procedure Laterality Date     APPENDECTOMY  2008     ARTHROPLASTY SHOULDER Left 2/1/2017    Procedure: ARTHROPLASTY SHOULDER;  Surgeon: Lui Gonzalez MD;  [facility]     CHOLECYSTECTOMY  2008     GYN SURGERY  2012    VAG HYST, OOPHORECTOMY     GYN SURGERY  2008    ANTERIOR COLPORRHAPHY, REPAIR CYSTOCELE     HC ANTER COLPORRHAPHY,BLAD/VAGINA      Description: Anterior Colporrhaphy, Repair Of Cystocele;  Recorded: 02/25/2008;     HC REMOVAL GALLBLADDER      Description: Cholecystectomy;  Recorded: 10/28/2008;     HYSTERECTOMY  2004     OOPHORECTOMY Bilateral 2004     [place] VAGINAL HYSTERECTOMY,UTERUS 250 GMS/<      Description: Vaginal Hysterectomy;  Recorded: 02/25/2012;  Comments: for hypermenorrhea/ prolapse; Dr. Octavio LYNN APPENDECTOMY      Description: Appendectomy;  Recorded: 10/28/2008;       Family History:     Family History   Problem Relation Age of Onset     Breast Cancer Mother 57.00     Breast Cancer Maternal Grandmother         Unsure of age       Social History:    reports that she quit smoking about 44 years ago. Her smoking use included cigarettes. She has never used smokeless tobacco. She reports current alcohol use. She reports that she does not use drugs.    Review of Systems:   12 systems are reviewed negative except for in HPI.    Meds:     Current Outpatient Medications:      acetaminophen (TYLENOL) 500  MG tablet, Take 1,000 mg by mouth 2 times daily as needed for mild pain, Disp: , Rfl:      cetirizine (ZYRTEC) 10 MG tablet, Take 10 mg by mouth daily., Disp: , Rfl:      cycloSPORINE (RESTASIS) 0.05 % ophthalmic emulsion, Place 1 drop into both eyes 2 times daily, Disp: , Rfl:     Current Facility-Administered Medications:      respiratory syncytial virus vaccine, bivalent (ABRYSVO) injection 0.5 mL, 0.5 mL, Intramuscular, Once, Hyun Mcgovern MD     Allergies:   Cephalexin, Glucose, Lactose, and Other environmental allergy    Objective:      Physical Exam  58.5 kg (129 lb)     Body mass index is 21.8 kg/m .  /54 (BP Location: Left arm, Patient Position: Sitting, Cuff Size: Adult Regular)   Pulse 70   Resp 18   Wt 58.5 kg (129 lb)   LMP  (LMP Unknown)   SpO2 99%   BMI 21.80 kg/m      General Appearance:   Awake, Alert, No acute distress.   HEENT:  Pupil equal, reactive to light. No scleral icterus; the mucous membranes were moist. No oral ulcers or thrush.    Neck: No cervical bruits. No JVD. No thyromegaly. No lymph node enlargement or tenderness.   Chest: The spine was straight. The chest was symmetric.   Lungs:   Respirations unlabored. Lungs are clear to auscultation. No crackles. No wheezing.   Cardiovascular:   RRR, normal first and second heart sounds with no murmurs. No rubs or gallops.    Abdomen:  Soft. No tenderness. Non-distended. Bowels sounds are present   Extremities: Equal posterior tibial pulses. No leg edema.   Skin: No rashes or ulcers. Warm, Dry.   Musculoskeletal: No tenderness. No deformity.   Neurologic: Mood and affect are appropriate. No focal deficits.         EKG:  Personally reivewed  Normal sinus rhythm  Dynamic nonspecific T wave abnormality    Cardiac Imaging Studies  Echo on August 15, 2024:  Left ventricular size, wall motion and function are normal. The ejection  fraction is 55-60%.  Normal right ventricle size and systolic function.  No hemodynamically  significant valvular abnormalities.    Coronary CT angiogram on August 15, 2024:     No evidence for coronary artery disease.     Calcium Scoring: The total Agatston score is 0. A calcium score of zero places the individual in the lowest quartile when compared to an age and gender matched control group and implies a low risk of cardiac events in the next 10 years. (less than 1% per year).    Lab Review   Lab Results   Component Value Date     07/10/2023     02/04/2017    CO2 25 07/10/2023    CO2 24 04/12/2022    CO2 29 02/04/2017    BUN 16.4 07/10/2023    BUN 19 04/12/2022    BUN 4 02/04/2017     Lab Results   Component Value Date    WBC 5.5 07/10/2023    HGB 12.6 07/10/2023    HGB 9.7 02/03/2017    HCT 38.5 07/10/2023     07/10/2023     07/10/2023     Lab Results   Component Value Date    CHOL 169 11/23/2022    TRIG 84 11/23/2022    HDL 63 11/23/2022    LDL 89 11/23/2022     LDL Cholesterol Calculated   Date Value Ref Range Status   11/23/2022 89 <=100 mg/dL Final     Lab Results   Component Value Date    TROPONINI <0.01 03/02/2021     Lab Results   Component Value Date    TSH 2.31 03/15/2023    TSH 1.69 03/02/2021                   Thank you for allowing me to participate in the care of your patient.      Sincerely,     Narayan Harden MD     [facility]  cc:   Narayan Harden MD  [facility] [address]  [place] [address]

## 2024-12-17 NOTE — PROGRESS NOTES
AUDIOLOGY REPORT    SUBJECTIVE: Cliff Dai, 70 y.o.  year old female, was seen on 07/23/20 for a hearing aid fitting. Her hearing was assessed on 7/6/2020 and results revealed normal sloping to moderately severe sensorineural hearing loss bilaterally. Medical clearance was provided by Dr. Gentile.     OBJECTIVE:     Hearing Aids  : Phonak  Model:  Audeo M70-R  Style:   ALEJANDRO  : 1xS  SN:   [id number] (R); [id number] (L)  Warranty: 10/10/23    Retention tails and small cap domes used.     Real ear measures were performed using the Audioscan Verifit probe-tube microphone system and the NAL-NL1 prescriptive targets. Gain was adjusted to obtain a closer match to prescriptive targets for soft, average, and loud inputs. Maximum output was measured and was within acceptable limits and patient tolerance.    The volume control is enabled. No programs made yet. Hearing aids were not paired to iphone today. Gain set to 90% target to assist with adaptation.    Cliff reports satisfaction with the fit and sound quality of the instruments. Use, care, trial period and realistic expectations were reviewed in detail.  Cliff is able to demonstrate independent manipulation of the instruments with battery care and insertion/removal.     ASSESSMENT: Hearing aids fit. Purchase agreement signed.    PLAN: Cliff will return in 2-3 weeks for hearing aid follow up. Please contact our clinic at [phone] with questions or concerns.    Ralf Santo, [facility]  Clinical Audiologist  [place] #[id number]               PAST MEDICAL HISTORY:  Descending aortic aneurysm     Hypertension     Mitral valve regurgitation      Bexarotene Pregnancy And Lactation Text: This medication is Pregnancy Category X and should not be given to women who are pregnant or may become pregnant. This medication should not be used if you are breast feeding.

## 2025-03-10 ENCOUNTER — NURSE TRIAGE (OUTPATIENT)
Dept: FAMILY MEDICINE | Facility: CLINIC | Age: 75
End: 2025-03-10
Payer: COMMERCIAL

## 2025-03-10 NOTE — TELEPHONE ENCOUNTER
SEE IN OFFICE OR VIDEO VISIT TODAY:  * You need to be examined today or have a video telemedicine visit.  * PCP OFFICE VISIT: Let me give you an appointment.  * Appointment scheduled for tomorrow, 3/11/25  * If trouble breathing, swallowing, SOB, wheezing o chest pain / tightness, will call 911 or got to ER  * IF NO AVAILABLE OFFICE OR VIDEO APPOINTMENTS: You need to be seen in an Urgent Care Center. Go there today. A nearby Urgent Care Center is often a good source of care. Another choice leland to go to the Emergency Department.    Patient and pt's  on the phone call  Trouble swallowing and chronic cough X 1 month  Moderate swallowing difficulties  Coughing at night which wakes her up at night  Quit drinking alcohol X 1 week  Weight loss for 1 month  Increased cough X 1 month  Dry mouth  No SOB or wheezing    Message routed to Dr Infante for FYI review / appt    Katya Soto RN  [facility]    Reason for Disposition   Swallowing difficulty and cause unknown  (Exception: Difficulty swallowing is a chronic symptom.)    Additional Information   Negative: SEVERE difficulty swallowing (e.g., drooling or spitting) and started suddenly after taking a medicine or allergic foods   Negative: Wheezing, stridor, hoarseness, or difficulty breathing   Negative: Swollen tongue and sudden onset   Negative: Sounds like a life-threatening emergency to the triager   Negative: Sore throat (throat pain with swallowing)   Negative: SEVERE difficulty swallowing (e.g., drooling or spitting, can't swallow water)   Negative: Symptoms of food or bone stuck in throat or esophagus (e.g., pain in throat or chest, FB sensation, blood-tinged saliva)   Negative: SEVERE symptoms of pill stuck in throat or esophagus (e.g., severe pain, bleeding, or difficulty swallowing liquids)   Negative: Drinking very little and dehydration suspected (e.g., no urine > 12 hours, very dry mouth, very lightheaded)   Negative: Refuses to  "drink anything for > 12 hours   Negative: Patient sounds very sick or weak to the triager   Negative: Coughing spells occur during or within 2 hours after eating/feedings   Negative: Fever > 100.4 F (38.0 C)    Answer Assessment - Initial Assessment Questions  1. DESCRIPTION: \"Tell me more about this problem.\" \"Are you  having trouble swallowing liquids, solids, or both?\" \"Any trouble with swallowing saliva (spit)?\"    Patient and pt's  on the phone call  Trouble swallowing and chronic cough X 1 month  Moderate swallowing difficulties  Coughing at night which wakes her up at night  Quit drinking alcohol X 1 week  Weight loss for 1 month  Increased cough X 1 month  Dry mouth  No SOB or wheezing      2. SEVERITY: \"How bad is the swallowing difficulty?\"  (Scale 1-10; or mild, moderate, severe)      Moderate swallowing     3. ONSET: \"When did the swallowing problems begin?\"       1 month    4. CAUSE: \"What do you think is causing the problem?\"  (e.g., dry mouth, food or pill stuck in throat, mouth pain, sore throat, progression of disease process such as dementia or Parkinson's disease).       Unknown    5. CHRONIC or RECURRENT: \"Is this a new problem for you?\"  If No, ask: \"How long have you had this problem?\" (e.g., days, weeks, months)       None    6. OTHER SYMPTOMS: \"Do you have any other symptoms?\" (e.g., chest pain, difficulty breathing, mouth sores, sore throat, swollen tongue, chest pain)    Increased cough X 1 month  Dry mouth    7. PREGNANCY: \"Is there any chance you are pregnant?\" \"When was your last menstrual period?\"      N/a    Protocols used: Swallowing Difficulty-A-[place]    "

## 2025-03-11 ENCOUNTER — OFFICE VISIT (OUTPATIENT)
Dept: FAMILY MEDICINE | Facility: CLINIC | Age: 75
End: 2025-03-11
Payer: COMMERCIAL

## 2025-03-11 VITALS
DIASTOLIC BLOOD PRESSURE: 68 MMHG | OXYGEN SATURATION: 99 % | SYSTOLIC BLOOD PRESSURE: 108 MMHG | BODY MASS INDEX: 20.79 KG/M2 | TEMPERATURE: 97.4 F | RESPIRATION RATE: 21 BRPM | WEIGHT: 123 LBS | HEART RATE: 79 BPM

## 2025-03-11 DIAGNOSIS — K59.03 DRUG INDUCED CONSTIPATION: ICD-10-CM

## 2025-03-11 DIAGNOSIS — R10.84 ABDOMINAL PAIN, GENERALIZED: ICD-10-CM

## 2025-03-11 DIAGNOSIS — R63.4 WEIGHT LOSS, UNINTENTIONAL: ICD-10-CM

## 2025-03-11 DIAGNOSIS — R13.10 DYSPHAGIA, UNSPECIFIED TYPE: Primary | ICD-10-CM

## 2025-03-11 LAB
BASOPHILS # BLD AUTO: 0.1 10E3/UL (ref 0–0.2)
BASOPHILS NFR BLD AUTO: 1 %
EOSINOPHIL # BLD AUTO: 0 10E3/UL (ref 0–0.7)
EOSINOPHIL NFR BLD AUTO: 1 %
ERYTHROCYTE [DISTWIDTH] IN BLOOD BY AUTOMATED COUNT: 12.6 % (ref 10–15)
HCT VFR BLD AUTO: 35.8 % (ref 35–47)
HGB BLD-MCNC: 11.8 G/DL (ref 11.7–15.7)
IMM GRANULOCYTES # BLD: 0 10E3/UL
IMM GRANULOCYTES NFR BLD: 0 %
LYMPHOCYTES # BLD AUTO: 0.9 10E3/UL (ref 0.8–5.3)
LYMPHOCYTES NFR BLD AUTO: 16 %
MCH RBC QN AUTO: 34.3 PG (ref 26.5–33)
MCHC RBC AUTO-ENTMCNC: 33 G/DL (ref 31.5–36.5)
MCV RBC AUTO: 104 FL (ref 78–100)
MONOCYTES # BLD AUTO: 0.5 10E3/UL (ref 0–1.3)
MONOCYTES NFR BLD AUTO: 8 %
NEUTROPHILS # BLD AUTO: 4.4 10E3/UL (ref 1.6–8.3)
NEUTROPHILS NFR BLD AUTO: 74 %
PLATELET # BLD AUTO: 232 10E3/UL (ref 150–450)
RBC # BLD AUTO: 3.44 10E6/UL (ref 3.8–5.2)
WBC # BLD AUTO: 5.9 10E3/UL (ref 4–11)

## 2025-03-11 PROCEDURE — 36415 COLL VENOUS BLD VENIPUNCTURE: CPT | Performed by: FAMILY MEDICINE

## 2025-03-11 PROCEDURE — 82607 VITAMIN B-12: CPT | Performed by: FAMILY MEDICINE

## 2025-03-11 PROCEDURE — 84443 ASSAY THYROID STIM HORMONE: CPT | Performed by: FAMILY MEDICINE

## 2025-03-11 PROCEDURE — 3078F DIAST BP <80 MM HG: CPT | Performed by: FAMILY MEDICINE

## 2025-03-11 PROCEDURE — 85025 COMPLETE CBC W/AUTO DIFF WBC: CPT | Performed by: FAMILY MEDICINE

## 2025-03-11 PROCEDURE — 80053 COMPREHEN METABOLIC PANEL: CPT | Performed by: FAMILY MEDICINE

## 2025-03-11 PROCEDURE — 3074F SYST BP LT 130 MM HG: CPT | Performed by: FAMILY MEDICINE

## 2025-03-11 PROCEDURE — 99214 OFFICE O/P EST MOD 30 MIN: CPT | Performed by: FAMILY MEDICINE

## 2025-03-11 NOTE — PROGRESS NOTES
Prior to immunization administration, verified patients identity using patient s name and date of birth. Please see Immunization Activity for additional information.     Screening Questionnaire for Adult Immunization    Are you sick today?   No   Do you have allergies to medications, food, a vaccine component or latex?   Yes   Have you ever had a serious reaction after receiving a vaccination?   No   Do you have a long-term health problem with heart, lung, kidney, or metabolic disease (e.g., diabetes), asthma, a blood disorder, no spleen, complement component deficiency, a cochlear implant, or a spinal fluid leak?  Are you on long-term aspirin therapy?   No   Do you have cancer, leukemia, HIV/AIDS, or any other immune system problem?   No   Do you have a parent, brother, or sister with an immune system problem?   No   In the past 3 months, have you taken medications that affect  your immune system, such as prednisone, other steroids, or anticancer drugs; drugs for the treatment of rheumatoid arthritis, Crohn s disease, or psoriasis; or have you had radiation treatments?   No   Have you had a seizure, or a brain or other nervous system problem?   No   During the past year, have you received a transfusion of blood or blood    products, or been given immune (gamma) globulin or antiviral drug?   No   For women: Are you pregnant or is there a chance you could become       pregnant during the next month?   No   Have you received any vaccinations in the past 4 weeks?   No     Immunization questionnaire was positive for at least one answer.  Notified provider.      Patient instructed to remain in clinic for 15 minutes afterwards, and to report any adverse reactions.     Screening performed by Gavi Quispe MA on 3/11/2025 at 10:32 AM.        Answers submitted by the patient for this visit:  General Questionnaire (Submitted on 3/11/2025)  Chief Complaint: Chronic problems general questions HPI Form  What is the reason for your  visit today? : wieght loss  How many days per week do you miss taking your medication?: 0  Questionnaire about: Chronic problems general questions HPI Form (Submitted on 3/11/2025)  Chief Complaint: Chronic problems general questions HPI Form

## 2025-03-11 NOTE — PROGRESS NOTES
"  {PROVIDER CHARTING PREFERENCE:975526}    Subjective   Cliff is a 74 year old, presenting for the following health issues:  Trouble swallowing (//) and Weight loss concerns      3/11/2025    10:27 AM   Additional Questions   Roomed by Gavi KING   Accompanied by      \"Can't swallow\"   Feels like a hitch in back of throat   Uses a lot of cough drops to soothe her throat - eat better  \"Very upsetting\"  Will cut things in small pieces -   Eats soft foods, but needs Lactaid when eating dairy     Eating high calorie intense diet - still losing weight     Cough started a month ago  Sometimes wakes her up at night   Can't lay down because \"this situation\" prevents her from sleeping    Left sided - feels inside the throat    Uses Voltaren to right neck - that helps  Cuts food in really small pieces - chews it well     Has appointment with neurologist 6/13  Cardiologist - seen 9/30/2024 - \"Healthy as a horse\"          History of Present Illness       Reason for visit:  Wieght loss   She is taking medications regularly.        {MA/LPN/RN Pre-Provider Visit Orders- hCG/UA/Strep (Optional):661892}  {SUPERLIST (Optional):843324}  {additonal problems for provider to add (Optional):653585}    {ROS Picklists (Optional):979191}      Objective    /68 (BP Location: Right arm, Patient Position: Sitting, Cuff Size: Adult Regular)   Pulse 79   Temp 97.4  F (36.3  C) (Oral)   Resp 21   Wt 55.8 kg (123 lb)   LMP  (LMP Unknown)   SpO2 99%   BMI 20.79 kg/m    Body mass index is 20.79 kg/m .  Physical Exam   {Exam List (Optional):550127}    {Diagnostic Test Results (Optional):527804}        Signed Electronically by: MARIUM KRUEGER MD  {Email feedback regarding this note to [email or web address]   :698815}  " "that helps  Cuts food in really small pieces - chews it well     Has appointment with neurologist 6/13  Cardiologist - seen 9/30/2024 - \"Healthy as a horse\"          History of Present Illness       Reason for visit:  Wieght loss   She is taking medications regularly.        Review of Systems   Constitutional:  Positive for fatigue and unexpected weight change (decreasing). Negative for chills and fever.   HENT:  Positive for trouble swallowing. Negative for ear pain and sore throat.    Eyes:  Negative for pain and visual disturbance.   Respiratory:  Negative for cough and shortness of breath.    Cardiovascular:  Negative for chest pain and palpitations.   Gastrointestinal:  Positive for abdominal pain. Negative for vomiting.   Genitourinary:  Negative for dysuria and hematuria.   Musculoskeletal:  Negative for arthralgias and back pain.   Skin:  Negative for color change and rash.   Neurological:  Negative for seizures and syncope.   Psychiatric/Behavioral:          Cognitive decline with memory issues   All other systems reviewed and are negative.          Objective    /68 (BP Location: Right arm, Patient Position: Sitting, Cuff Size: Adult Regular)   Pulse 79   Temp 97.4  F (36.3  C) (Oral)   Resp 21   Wt 55.8 kg (123 lb)   LMP  (LMP Unknown)   SpO2 99%   BMI 20.79 kg/m    Body mass index is 20.79 kg/m .  Physical Exam  Vitals reviewed.   Constitutional:       General: She is not in acute distress.     Appearance: Normal appearance. She is not ill-appearing.   HENT:      Head: Normocephalic.      Right Ear: Tympanic membrane, ear canal and external ear normal.      Left Ear: Tympanic membrane, ear canal and external ear normal.      Nose: Nose normal.      Mouth/Throat:      Mouth: Mucous membranes are moist.      Pharynx: No posterior oropharyngeal erythema.   Eyes:      Extraocular Movements: Extraocular movements intact.      Conjunctiva/sclera: Conjunctivae normal.      Pupils: Pupils are equal, " round, and reactive to light.   Cardiovascular:      Rate and Rhythm: Normal rate and regular rhythm.      Pulses: Normal pulses.      Heart sounds: Normal heart sounds. No murmur heard.  Pulmonary:      Effort: Pulmonary effort is normal.      Breath sounds: Normal breath sounds.   Abdominal:      Palpations: Abdomen is soft. There is no mass.      Tenderness: There is no abdominal tenderness. There is no guarding or rebound.   Musculoskeletal:         General: No deformity. Normal range of motion.      Cervical back: Normal range of motion and neck supple.   Lymphadenopathy:      Cervical: No cervical adenopathy.   Skin:     General: Skin is warm and dry.   Neurological:      General: No focal deficit present.      Mental Status: She is alert.   Psychiatric:         Mood and Affect: Mood normal.         Behavior: Behavior normal.            Results for orders placed or performed in visit on 03/11/25   TSH with free T4 reflex     Status: Normal   Result Value Ref Range    TSH 1.21 0.30 - 4.20 uIU/mL   Comprehensive metabolic panel (BMP + Alb, Alk Phos, ALT, AST, Total. Bili, TP)     Status: Abnormal   Result Value Ref Range    Sodium 139 135 - 145 mmol/L    Potassium 4.0 3.4 - 5.3 mmol/L    Carbon Dioxide (CO2) 29 22 - 29 mmol/L    Anion Gap 7 7 - 15 mmol/L    Urea Nitrogen 16.0 8.0 - 23.0 mg/dL    Creatinine 0.48 (L) 0.51 - 0.95 mg/dL    GFR Estimate >90 >60 mL/min/1.73m2    Calcium 9.2 8.8 - 10.4 mg/dL    Chloride 103 98 - 107 mmol/L    Glucose 94 70 - 99 mg/dL    Alkaline Phosphatase 49 40 - 150 U/L    AST 23 0 - 45 U/L    ALT 18 0 - 50 U/L    Protein Total 6.4 6.4 - 8.3 g/dL    Albumin 4.4 3.5 - 5.2 g/dL    Bilirubin Total 0.3 <=1.2 mg/dL   Vitamin B12     Status: Normal   Result Value Ref Range    Vitamin B12 450 232 - 1,245 pg/mL   CBC with platelets and differential     Status: Abnormal   Result Value Ref Range    WBC Count 5.9 4.0 - 11.0 10e3/uL    RBC Count 3.44 (L) 3.80 - 5.20 10e6/uL    Hemoglobin 11.8  11.7 - 15.7 g/dL    Hematocrit 35.8 35.0 - 47.0 %     (H) 78 - 100 fL    MCH 34.3 (H) 26.5 - 33.0 pg    MCHC 33.0 31.5 - 36.5 g/dL    RDW 12.6 10.0 - 15.0 %    Platelet Count 232 150 - 450 10e3/uL    % Neutrophils 74 %    % Lymphocytes 16 %    % Monocytes 8 %    % Eosinophils 1 %    % Basophils 1 %    % Immature Granulocytes 0 %    Absolute Neutrophils 4.4 1.6 - 8.3 10e3/uL    Absolute Lymphocytes 0.9 0.8 - 5.3 10e3/uL    Absolute Monocytes 0.5 0.0 - 1.3 10e3/uL    Absolute Eosinophils 0.0 0.0 - 0.7 10e3/uL    Absolute Basophils 0.1 0.0 - 0.2 10e3/uL    Absolute Immature Granulocytes 0.0 <=0.4 10e3/uL   CBC with Platelets & Differential     Status: Abnormal    Narrative    The following orders were created for panel order CBC with Platelets & Differential.  Procedure                               Abnormality         Status                     ---------                               -----------         ------                     CBC with platelets and ...[[id number]]  Abnormal            Final result                 Please view results for these tests on the individual orders.           Signed Electronically by: MARIUM KRUEGER MD

## 2025-03-12 ENCOUNTER — TELEPHONE (OUTPATIENT)
Dept: OTOLARYNGOLOGY | Facility: CLINIC | Age: 75
End: 2025-03-12

## 2025-03-12 LAB
ALBUMIN SERPL BCG-MCNC: 4.4 G/DL (ref 3.5–5.2)
ALP SERPL-CCNC: 49 U/L (ref 40–150)
ALT SERPL W P-5'-P-CCNC: 18 U/L (ref 0–50)
ANION GAP SERPL CALCULATED.3IONS-SCNC: 7 MMOL/L (ref 7–15)
AST SERPL W P-5'-P-CCNC: 23 U/L (ref 0–45)
BILIRUB SERPL-MCNC: 0.3 MG/DL
BUN SERPL-MCNC: 16 MG/DL (ref 8–23)
CALCIUM SERPL-MCNC: 9.2 MG/DL (ref 8.8–10.4)
CHLORIDE SERPL-SCNC: 103 MMOL/L (ref 98–107)
CREAT SERPL-MCNC: 0.48 MG/DL (ref 0.51–0.95)
EGFRCR SERPLBLD CKD-EPI 2021: >90 ML/MIN/1.73M2
GLUCOSE SERPL-MCNC: 94 MG/DL (ref 70–99)
HCO3 SERPL-SCNC: 29 MMOL/L (ref 22–29)
POTASSIUM SERPL-SCNC: 4 MMOL/L (ref 3.4–5.3)
PROT SERPL-MCNC: 6.4 G/DL (ref 6.4–8.3)
SODIUM SERPL-SCNC: 139 MMOL/L (ref 135–145)
TSH SERPL DL<=0.005 MIU/L-ACNC: 1.21 UIU/ML (ref 0.3–4.2)
VIT B12 SERPL-MCNC: 450 PG/ML (ref 232–1245)

## 2025-03-12 NOTE — TELEPHONE ENCOUNTER
"This encounter is being sent to inform the clinic that this patient has a referral from Hyun Mcgovern MD  for the diagnoses of hitch\" in swallow with pain/tenderness in \"throat\" with documented weight loss - needs direct visualization  Dysphagia, unspecified type  and has requested that this patient be seen within 1-2 WEEKS and/or with ANY PROVIDER.  Based on the availability of our provider(s), we are unable to accommodate this request.    Were all sites offered this patient?  Yes    Does scheduling algorithm request to schedule next available?  Patient has been scheduled for the first available opening with DR ARAUZ on 9/9.  We have informed the patient that the clinic will review their referral and reach out if a sooner appointment is medically necessary.       "

## 2025-03-18 ENCOUNTER — HOSPITAL ENCOUNTER (OUTPATIENT)
Dept: CT IMAGING | Facility: HOSPITAL | Age: 75
Discharge: HOME | End: 2025-03-18
Attending: FAMILY MEDICINE
Payer: COMMERCIAL

## 2025-03-18 DIAGNOSIS — R10.84 ABDOMINAL PAIN, GENERALIZED: ICD-10-CM

## 2025-03-18 DIAGNOSIS — R63.4 WEIGHT LOSS, UNINTENTIONAL: ICD-10-CM

## 2025-03-18 DIAGNOSIS — R13.10 DYSPHAGIA, UNSPECIFIED TYPE: ICD-10-CM

## 2025-03-18 PROCEDURE — 250N000011 HC RX IP 250 OP 636: Performed by: FAMILY MEDICINE

## 2025-03-18 PROCEDURE — 70491 CT SOFT TISSUE NECK W/DYE: CPT

## 2025-03-18 PROCEDURE — 74177 CT ABD & PELVIS W/CONTRAST: CPT

## 2025-03-18 PROCEDURE — 71260 CT THORAX DX C+: CPT

## 2025-03-18 RX ORDER — IOPAMIDOL 755 MG/ML
75 INJECTION, SOLUTION INTRAVASCULAR ONCE
Status: COMPLETED | OUTPATIENT
Start: 2025-03-18 | End: 2025-03-18

## 2025-03-18 RX ADMIN — IOPAMIDOL 75 ML: 755 INJECTION, SOLUTION INTRAVENOUS at 17:55

## 2025-03-23 ASSESSMENT — ENCOUNTER SYMPTOMS
ARTHRALGIAS: 0
FATIGUE: 1
UNEXPECTED WEIGHT CHANGE: 1
FEVER: 0
VOMITING: 0
SORE THROAT: 0
SHORTNESS OF BREATH: 0
BACK PAIN: 0
ABDOMINAL PAIN: 1
PALPITATIONS: 0
HEMATURIA: 0
SEIZURES: 0
COUGH: 0
CHILLS: 0
COLOR CHANGE: 0
TROUBLE SWALLOWING: 1
EYE PAIN: 0
DYSURIA: 0

## 2025-04-01 ENCOUNTER — NURSE TRIAGE (OUTPATIENT)
Dept: FAMILY MEDICINE | Facility: CLINIC | Age: 75
End: 2025-04-01
Payer: COMMERCIAL

## 2025-04-01 NOTE — TELEPHONE ENCOUNTER
Called pt back to inquire specific body parts for x-ray. LM on .     It pt return call, please clarify which body part is she requesting Xray for and symptoms. Then pend order and route to PCP.     Regina GILLETTE RN  [facility]

## 2025-04-01 NOTE — TELEPHONE ENCOUNTER
Order/Referral Request    Who is requesting: Patient    Orders being requested: X-ray    Reason service is needed/diagnosis: Patient took a fall a week ago on Sunday and her chiropractor recommended it for patient to get it checked.    When are orders needed by: ASAP    Has this been discussed with Provider: No    Does patient have a preference on a Group/Provider/Facility? No    Does patient have an appointment scheduled?: No    Where to send orders: Place orders within Epic    Could we send this information to you in [facility] or would you prefer to receive a phone call?:   Patient would prefer a phone call     Okay to leave a detailed message?: Yes at Home number on file [phone] (home)

## 2025-04-02 NOTE — TELEPHONE ENCOUNTER
It would be best if she was seen sooner, but given that the fall was > 1 week ago, could be seen Friday.

## 2025-04-02 NOTE — TELEPHONE ENCOUNTER
"Dr. Infante - Please review, advise if OK for patient to wait for Friday appointment or if she should be seen more urgently.  Patient agrees to call back if any changes to status prior to appointment, present to ED if red flag symptoms.    Nurse Triage SBAR    Is this a 2nd Level Triage? YES, LICENSED PRACTITIONER REVIEW IS REQUIRED    Situation:  Patient reports fall on 3/23 due to \"balance issues\" from vertigo    Background:   Per patient, vertigo is chronic symptom that PCP is aware of.  Patient sees a chiropractor to help with vertigo, headaches.    Assessment:   Patient fell in her living room evening of 3/23  Sole of her right shoe caught on carpet, patient fell backwards onto tailbone.   reports patient hit back of head, patient does not recall this.   denies loss of consciousness, altered mental status.   had to help patient off floor due to vertigo/balance.  Patient reports worsening vertigo recently   Endorses headaches - chronic issue, symptoms stable  Denies vision changes  Speaking coherently on phone  Endorses \"discomfort\" of tailbone area   Reports ambulating fine for distances - endorses balance issues, needs support to transition from sitting to standing.  Denies any bruising, bleeding following fall.    Protocol Recommended Disposition:   See in Office Today, See More Appropriate Protocol    Recommendation:   Needs to be seen - patient would like to see PCP, scheduled in next available opening 4/4 at 1000  Move slowly, carefully and rest PRN.  Drink fluids to maintain hydration  Discussed symptoms warranting return call to triage line vs red flag symptoms requiring immediate attention  Emphasized not waiting for appointment to report any status changes.     Patient verbalizes understanding and agreement with plan.    Routed to provider    Does the patient meet one of the following criteria for ADS visit consideration? 16+ years old, with an [facility] PCP     TIP  Providers, please " consider if this condition is appropriate for management at one of our Acute and Diagnostic Services sites.     If patient is a good candidate, please use dotphrase <dot>triageresponse and select Refer to ADS to document.     Reason for Disposition   Dizziness described as spinning or off balance (vertigo) AND new-onset or getting worse   Patient wants to be seen    Additional Information   Negative: SEVERE difficulty breathing (e.g., struggling for each breath, speaks in single words)   Negative: Shock suspected (e.g., cold/pale/clammy skin, too weak to stand, low BP, rapid pulse)   Negative: Difficult to awaken or acting confused (e.g., disoriented, slurred speech)   Negative: Fainted, and still feels dizzy afterwards   Negative: Overdose (accidental or intentional) of medications   Negative: New neurologic deficit that is present now: * Weakness of the face, arm, or leg on one side of the body * Numbness of the face, arm, or leg on one side of the body * Loss of speech or garbled speech   Negative: Heart beating < 50 beats per minute OR > 140 beats per minute   Negative: Sounds like a life-threatening emergency to the triager   Negative: Chest pain   Negative: Rectal bleeding, bloody stool, or tarry-black stool   Negative: Vomiting is main symptom   Negative: Diarrhea is main symptom   Negative: Headache is main symptom   Negative: Heat exhaustion suspected (i.e., dehydration from heat exposure)   Negative: Patient states that they are having an anxiety or panic attack   Negative: Dizziness from low blood sugar (i.e., < 60 mg/dl or 3.5 mmol/l)   Negative: SEVERE dizziness (e.g., unable to stand, requires support to walk, feels like passing out now)   Negative: SEVERE headache or neck pain   Negative: Spinning or tilting sensation (vertigo) present now and one or more stroke risk factors (i.e., hypertension, diabetes mellitus, prior stroke/TIA, heart attack, age over 60) (Exception: Prior physician evaluation for  this AND no different/worse than usual.)   Negative: Neurologic deficit that was brief (now gone), ANY of the following:* Weakness of the face, arm, or leg on one side of the body* Numbness of the face, arm, or leg on one side of the body* Loss of speech or garbled speech   Negative: Loss of vision or double vision  (Exception: Similar to previous migraines.)   Negative: Difficulty breathing   Negative: Extra heartbeats, irregular heart beating, or heart is beating very fast (i.e., 'palpitations')   Negative: Drinking very little and dehydration suspected (e.g., no urine > 12 hours, very dry mouth, very lightheaded)   Negative: Follows bleeding (e.g., stomach, rectum, vagina)  (Exception: Became dizzy from sight of small amount blood.)   Negative: Patient sounds very sick or weak to the triager   Negative: Lightheadedness (dizziness) present now, after 2 hours of rest and fluids   Negative: Spinning or tilting sensation (vertigo) present now   Negative: Fever > 103 F (39.4 C)   Negative: Fever > 100.0 F (37.8 C) and has diabetes mellitus or a weak immune system (e.g., HIV positive, cancer chemotherapy, organ transplant, splenectomy, chronic steroids)   Negative: Vomiting occurs with dizziness   Negative: Major injury from dangerous force (e.g., fall > 10 feet or 3 meters)   Negative: Major bleeding (e.g., actively dripping or spurting) and can't be stopped   Negative: Shock suspected (e.g., cold/pale/clammy skin, too weak to stand)   Negative: Difficult to awaken or acting confused (e.g., disoriented, slurred speech)   Negative: SEVERE weakness (e.g., unable to walk or barely able to walk, requires support) and new-onset or getting worse   Negative: Can't stand (bear weight) or walk and new-onset after fall   Negative: Sounds like a life-threatening emergency to the triager   Negative: Passed out (e.g., fainted, lost consciousness, blacked out and was not responding)   Negative: Weakness of the face, arm or leg on  one side of the body AND new-onset or getting worse   Negative: MODERATE dizziness (e.g., interferes with normal activities)  (Exception: Dizziness caused by heat exposure, sudden standing, or poor fluid intake.)    Protocols used: Falls and Hwmelvi-B-BU, Dizziness-A-OH

## 2025-04-03 NOTE — TELEPHONE ENCOUNTER
Left message on VM to confirmed appt on Friday 4/4/25 at 10am.     Regina GILLETTE RN  [facility]

## 2025-04-04 ENCOUNTER — ANCILLARY PROCEDURE (OUTPATIENT)
Dept: GENERAL RADIOLOGY | Facility: CLINIC | Age: 75
End: 2025-04-04
Attending: FAMILY MEDICINE
Payer: COMMERCIAL

## 2025-04-04 ENCOUNTER — OFFICE VISIT (OUTPATIENT)
Dept: FAMILY MEDICINE | Facility: CLINIC | Age: 75
End: 2025-04-04
Payer: COMMERCIAL

## 2025-04-04 VITALS
DIASTOLIC BLOOD PRESSURE: 72 MMHG | HEIGHT: 65 IN | OXYGEN SATURATION: 98 % | BODY MASS INDEX: 19.58 KG/M2 | WEIGHT: 117.5 LBS | SYSTOLIC BLOOD PRESSURE: 121 MMHG | RESPIRATION RATE: 16 BRPM | TEMPERATURE: 98.1 F | HEART RATE: 71 BPM

## 2025-04-04 DIAGNOSIS — R41.3 MEMORY LOSS: ICD-10-CM

## 2025-04-04 DIAGNOSIS — R42 VERTIGO: ICD-10-CM

## 2025-04-04 DIAGNOSIS — Z91.81 PERSONAL HISTORY OF FALL: ICD-10-CM

## 2025-04-04 DIAGNOSIS — R71.8 ELEVATED MCV: ICD-10-CM
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
## 2025-04-04 DIAGNOSIS — M53.3 PAIN IN THE COCCYX: ICD-10-CM
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
## 2025-04-04 LAB
ERYTHROCYTE [DISTWIDTH] IN BLOOD BY AUTOMATED COUNT: 12.5 % (ref 10–15)
FOLATE SERPL-MCNC: 12.9 NG/ML (ref 4.6–34.8)
HCT VFR BLD AUTO: 35.9 % (ref 35–47)
HCYS SERPL-SCNC: 9.5 UMOL/L (ref 0–15)
HGB BLD-MCNC: 11.8 G/DL (ref 11.7–15.7)
MCH RBC QN AUTO: 34.2 PG (ref 26.5–33)
MCHC RBC AUTO-ENTMCNC: 32.9 G/DL (ref 31.5–36.5)
MCV RBC AUTO: 104 FL (ref 78–100)
PLATELET # BLD AUTO: 258 10E3/UL (ref 150–450)
RBC # BLD AUTO: 3.45 10E6/UL (ref 3.8–5.2)
VIT B12 SERPL-MCNC: 466 PG/ML (ref 232–1245)
WBC # BLD AUTO: 4.6 10E3/UL (ref 4–11)

## 2025-04-04 PROCEDURE — 3074F SYST BP LT 130 MM HG: CPT | Performed by: FAMILY MEDICINE

## 2025-04-04 PROCEDURE — G2211 COMPLEX E/M VISIT ADD ON: HCPCS | Performed by: FAMILY MEDICINE

## 2025-04-04 PROCEDURE — 99214 OFFICE O/P EST MOD 30 MIN: CPT | Performed by: FAMILY MEDICINE

## 2025-04-04 PROCEDURE — 83090 ASSAY OF HOMOCYSTEINE: CPT | Performed by: FAMILY MEDICINE

## 2025-04-04 PROCEDURE — 82607 VITAMIN B-12: CPT | Performed by: FAMILY MEDICINE

## 2025-04-04 PROCEDURE — 83921 ORGANIC ACID SINGLE QUANT: CPT | Performed by: FAMILY MEDICINE

## 2025-04-04 PROCEDURE — 3078F DIAST BP <80 MM HG: CPT | Performed by: FAMILY MEDICINE

## 2025-04-04 PROCEDURE — 85027 COMPLETE CBC AUTOMATED: CPT | Performed by: FAMILY MEDICINE

## 2025-04-04 PROCEDURE — 82746 ASSAY OF FOLIC ACID SERUM: CPT | Performed by: FAMILY MEDICINE

## 2025-04-04 PROCEDURE — 36415 COLL VENOUS BLD VENIPUNCTURE: CPT | Performed by: FAMILY MEDICINE

## 2025-04-04 PROCEDURE — 72220 X-RAY EXAM SACRUM TAILBONE: CPT | Mod: TC | Performed by: RADIOLOGY

## 2025-04-04 ASSESSMENT — ENCOUNTER SYMPTOMS: HEADACHES: 1

## 2025-04-04 NOTE — PROGRESS NOTES
1. Fall, initial encounter (Primary)  2. Personal history of fall  Patient fell at home - fell onto butt - has been seeing chiropractor since this occurred - no clear acute injury; chiropractor thought she should have xray of tailbone.  Will order and have radiology read.      Will also do CT- head.  Has had recent workup for weight loss (continues to lose weight).  Did not repeat CT head, but with recent fall, check Ct - head as well.    - XR Sacrum and Coccyx 2 Views; Future  - CT Head w/o Contrast; Future    3. Elevated MCV  Recent workup included elevated MCV - no clear etiology - reports she used to drink a glass of wine daily, but doesn't drink alcohol much any more - no clear etiology - will recheck labs - consider further workup if otherwise negative.   - Vitamin B12; Future  - Folate; Future  - Methylmalonic Acid; Future  - Homocysteine; Future  - CBC with platelets; Future  - Vitamin B12  - Folate  - Methylmalonic Acid  - Homocysteine  - CBC with platelets    4. Pain in the coccyx  As above - awaiting radiology read.    - XR Sacrum and Coccyx 2 Views; Future    5. Vertigo  Patient does complain of some vertigo - dizzy.  No clear etiology.  Will try vestibular rehab.  Does have ENT evaluation upcoming.    - Physical Therapy  Referral; Future    6. Memory loss  Patient continues to have fairly rapidly progressive memory loss - awaiting follow up visit with Neurology in June.    - CT Head w/o Contrast; Future    The longitudinal plan of care for the diagnosis(es)/condition(s) as documented were addressed during this visit. Due to the added complexity in care, I will continue to support Cliff in the subsequent management and with ongoing continuity of care.      Subjective   Cliff is a 74 year old, presenting for the following health issues:  Fall (Pt stated that she fell on her back and lump on head/ pt stated that her tailbone is bruise ), Headache, and Dizziness        4/4/2025     9:55 AM  "  Additional Questions   Roomed by hser   Accompanied by      Things are \"terrible\"  Walking on the carpet - fell on her back - 10-11 days ago   Got a lump on her head - (\"just hit the carpet\")  Chiropractor recommended xray of lower back - tailbone    Vertigo - before it happened   Way more now     Plays cards on Thursdays - plays 500    Doesn't drink alcohol - MCV increasing -     Looking into assisted living - has a consultant  Needs a place with a dog -     Follow up with neurologist - June 13     Drinks ensure   is cooking high caloric content meals          History of Present Illness       Back Pain:  She presents for follow up of back pain. Patient's back pain is a new problem.    Original cause of back pain: a fall  First noticed back pain: 1-4 weeks ago  Patient feels back pain: comes and goesLocation of back pain:  Left lower back  Description of back pain: other  Back pain spreads: left buttocks    Since patient first noticed back pain, pain is: unchanged  Does back pain interfere with her job:  Not applicable  On a scale of 1-10 (10 being the worst), patient describes pain as:  2  What makes back pain worse: other   Acupuncture: not tried  Acetaminophen: not tried  Activity or exercise: not tried  Chiropractor:  Not tried  Cold: not tried  Heat: not tried  Massage: not tried  Muscle relaxants: not tried  NSAIDS: not tried  Opioids: not tried  Physical Therapy: not tried  Rest: not tried  Steroid Injection: not tried  Stretching: not tried  Surgery: not tried  TENS unit: not tried  Topical pain relievers: not tried  Other healthcare providers patient is seeing for back pain: None   She is taking medications regularly.          Review of Systems   Constitutional:  Positive for unexpected weight change (still losing weight). Negative for chills and fever. Appetite change: poor appetite.  HENT:  Negative for ear pain and sore throat.    Eyes:  Negative for pain and visual disturbance. " "  Respiratory:  Negative for cough and shortness of breath.    Cardiovascular:  Negative for chest pain and palpitations.   Gastrointestinal:  Negative for abdominal pain and vomiting.   Genitourinary:  Negative for dysuria and hematuria.   Musculoskeletal:  Negative for arthralgias and back pain.        Tailbone pain      Skin:  Negative for color change and rash.   Neurological:  Positive for headaches. Negative for seizures and syncope.   Psychiatric/Behavioral:          Cognitive deficits     All other systems reviewed and are negative.          Objective    /72 (BP Location: Right arm, Patient Position: Sitting, Cuff Size: Adult Regular)   Pulse 71   Temp 98.1  F (36.7  C) (Temporal)   Resp 16   Ht 1.638 m (5' 4.5\")   Wt 53.3 kg (117 lb 8 oz)   LMP  (LMP Unknown)   SpO2 98%   BMI 19.86 kg/m    Body mass index is 19.86 kg/m .  Physical Exam  Vitals reviewed.   Constitutional:       General: She is not in acute distress.     Appearance: Normal appearance.   HENT:      Head: Normocephalic.      Right Ear: Tympanic membrane, ear canal and external ear normal.      Left Ear: Tympanic membrane, ear canal and external ear normal.      Nose: Nose normal.      Mouth/Throat:      Mouth: Mucous membranes are moist.      Pharynx: No posterior oropharyngeal erythema.   Eyes:      Extraocular Movements: Extraocular movements intact.      Conjunctiva/sclera: Conjunctivae normal.      Pupils: Pupils are equal, round, and reactive to light.   Cardiovascular:      Rate and Rhythm: Normal rate and regular rhythm.      Pulses: Normal pulses.      Heart sounds: Normal heart sounds. No murmur heard.  Pulmonary:      Effort: Pulmonary effort is normal.      Breath sounds: Normal breath sounds.   Abdominal:      Palpations: Abdomen is soft. There is no mass.      Tenderness: There is no abdominal tenderness. There is no guarding or rebound.   Musculoskeletal:         General: Tenderness (at base of spine - no clear " palpable deformity) present. No deformity. Normal range of motion.      Cervical back: Normal range of motion and neck supple.   Lymphadenopathy:      Cervical: No cervical adenopathy.   Skin:     General: Skin is warm and dry.   Neurological:      General: No focal deficit present.      Mental Status: She is alert.      Comments: Cognitive deficits     Psychiatric:         Mood and Affect: Mood normal.            Results for orders placed or performed in visit on 04/04/25   XR Sacrum and Coccyx 2 Views     Status: Abnormal   Result Value Ref Range    Radiologist flags [facility] delivery (Urgent)     Narrative    EXAM: XR SACRUM AND COCCYX 2 VIEWS  LOCATION: [facility]  DATE: 04/04/2025    INDICATION: Fall, initial encounter. Pain in the coccyx.  COMPARISON: None.      Impression    IMPRESSION: There is angulation and cortical offset along the distal sacrum near the sacrococcygeal junction concerning for fracture. MRI could assess further if needed.    Bones are markedly demineralized. 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    [Access Center: [facility] delivery]    This report will be copied to the [facility] to ensure a provider acknowledges the finding. Access Center is available Monday through Friday 8am-3:30 pm.       Results for orders placed or performed in visit on 04/04/25   Vitamin B12     Status: Normal   Result Value Ref Range    Vitamin B12 466 232 - 1,245 pg/mL   Folate     Status: Normal   Result Value Ref Range    Folic Acid 12.9 4.6 - 34.8 ng/mL   Homocysteine     Status: Normal   Result Value Ref Range    Homocysteine 9.5 0.0 - 15.0 umol/L   CBC with platelets     Status: Abnormal   Result Value Ref Range    WBC Count 4.6 4.0 - 11.0 10e3/uL    RBC Count 3.45 (L) 3.80 - 5.20 10e6/uL    Hemoglobin 11.8 11.7 - 15.7 g/dL    Hematocrit 35.9 35.0 - 47.0 %     (H) 78 - 100 fL    MCH 34.2 (H) 26.5 - 33.0 pg    MCHC 32.9 31.5 - 36.5 g/dL    RDW 12.5 10.0 - 15.0  %    Platelet Count 258 150 - 450 10e3/uL           Signed Electronically by: MARIUM KRUEGER MD

## 2025-04-04 NOTE — PROGRESS NOTES
Prior to immunization administration, verified patients identity using patient s name and date of birth. Please see Immunization Activity for additional information.     Screening Questionnaire for Adult Immunization    Are you sick today?   No   Do you have allergies to medications, food, a vaccine component or latex?   Yes   Have you ever had a serious reaction after receiving a vaccination?   No   Do you have a long-term health problem with heart, lung, kidney, or metabolic disease (e.g., diabetes), asthma, a blood disorder, no spleen, complement component deficiency, a cochlear implant, or a spinal fluid leak?  Are you on long-term aspirin therapy?   No   Do you have cancer, leukemia, HIV/AIDS, or any other immune system problem?   No   Do you have a parent, brother, or sister with an immune system problem?   No   In the past 3 months, have you taken medications that affect  your immune system, such as prednisone, other steroids, or anticancer drugs; drugs for the treatment of rheumatoid arthritis, Crohn s disease, or psoriasis; or have you had radiation treatments?   No   Have you had a seizure, or a brain or other nervous system problem?   No   During the past year, have you received a transfusion of blood or blood    products, or been given immune (gamma) globulin or antiviral drug?   No   For women: Are you pregnant or is there a chance you could become       pregnant during the next month?   No   Have you received any vaccinations in the past 4 weeks?   No     Immunization questionnaire was positive for at least one answer.  Notified provider.      Patient instructed to remain in clinic for 15 minutes afterwards, and to report any adverse reactions.     Screening performed by Genesis Clemons MA on 4/4/2025 at 9:57 AM.

## 2025-04-05 LAB — RADIOLOGIST FLAGS: ABNORMAL

## 2025-04-06 ASSESSMENT — ENCOUNTER SYMPTOMS
ABDOMINAL PAIN: 0
HEMATURIA: 0
SORE THROAT: 0
BACK PAIN: 0
PALPITATIONS: 0
DYSURIA: 0
SHORTNESS OF BREATH: 0
CHILLS: 0
VOMITING: 0
EYE PAIN: 0
ARTHRALGIAS: 0
FEVER: 0
UNEXPECTED WEIGHT CHANGE: 1
COUGH: 0
SEIZURES: 0
COLOR CHANGE: 0

## 2025-04-09 LAB — METHYLMALONATE SERPL-SCNC: 0.18 UMOL/L (ref 0–0.4)

## 2025-04-14 ENCOUNTER — THERAPY VISIT (OUTPATIENT)
Dept: PHYSICAL THERAPY | Facility: REHABILITATION | Age: 75
End: 2025-04-14
Attending: FAMILY MEDICINE
Payer: COMMERCIAL

## 2025-04-14 DIAGNOSIS — H81.10 BENIGN PAROXYSMAL POSITIONAL VERTIGO, UNSPECIFIED LATERALITY: Primary | ICD-10-CM

## 2025-04-14 DIAGNOSIS — Z91.81 AT HIGH RISK FOR FALLS: ICD-10-CM

## 2025-04-14 DIAGNOSIS — R42 VERTIGO: ICD-10-CM

## 2025-04-14 PROCEDURE — 97161 PT EVAL LOW COMPLEX 20 MIN: CPT | Mod: GP | Performed by: PHYSICAL THERAPIST

## 2025-04-14 PROCEDURE — 97112 NEUROMUSCULAR REEDUCATION: CPT | Mod: GP | Performed by: PHYSICAL THERAPIST

## 2025-04-14 NOTE — PROGRESS NOTES
PHYSICAL THERAPY EVALUATION  Type of Visit: Evaluation       Fall Risk Screen:  Fall screen completed by: PT  Have you fallen 2 or more times in the past year?: Yes  Have you fallen and had an injury in the past year?: Yes  Is patient a fall risk?: Yes    Subjective   Goes to see chiropractor 2 x weekly, works on what ever needs adjusting. Dizziness going on since 2022 (Hospitalized for dizziness), has had some form of dizziness progressively gotten worse. Looking into assisted living options. History of HA, migraines mentioned in chart but reports no recurrent migraines. Hearing aids prior to dizziness 2020. Vision checked every couple of years. Falls 1-2 x month doesn't use a assistive device for ambulation, uses spouse for steadiness.  Has  that comes to house sets up a program.      Presenting condition or subjective complaint: vertigo  Date of onset: 04/08/25 (2022 hospitalized)    Relevant medical history:     Past Medical History:   Diagnosis Date    Arthritis     OSTEOARTHRITIS BOTH SHOULDERS    Osteoporosis     Peripheral neuropathy     Sjogren's syndrome     Stress incontinence       Patient Active Problem List   Diagnosis    Presence of artificial shoulder joint    Hip pain, left    Bilateral shoulder region arthritis    Cervicalgia    Edema    Excessive thirst    Female stress incontinence    Hyperglycemia    Hypokalemia    Inflammation of joint of shoulder region    Low back pain    Osteoarthritis of both shoulders    Osteoporosis    Pain in joint, shoulder region    Primary osteoarthritis of left hip    Prolapse of vaginal wall    Greater trochanteric bursitis of left hip    Sjogren's syndrome    Dermatitis    Symptomatic menopausal or female climacteric states    Urticaria    Vertigo    Vitamin D deficiency    Peripheral nerve disease    Sicca syndrome    Sciatica    Memory loss      Dates & types of surgery: gallblater  Past Surgical History:   Procedure Laterality Date    APPENDECTOMY  2008     ARTHROPLASTY SHOULDER Left 2/1/2017    Procedure: ARTHROPLASTY SHOULDER;  Surgeon: Lui Gonzalez MD;  Location: [facility] OR    CHOLECYSTECTOMY  2008    GYN SURGERY  2012    VAG HYST, OOPHORECTOMY    GYN SURGERY  2008    ANTERIOR COLPORRHAPHY, REPAIR CYSTOCELE    HC ANTER COLPORRHAPHY,BLAD/VAGINA      Description: Anterior Colporrhaphy, Repair Of Cystocele;  Recorded: 02/25/2008;    HC REMOVAL GALLBLADDER      Description: Cholecystectomy;  Recorded: 10/28/2008;    HYSTERECTOMY  2004    OOPHORECTOMY Bilateral 2004    SD VAGINAL HYSTERECTOMY,UTERUS 250 GMS/<      Description: Vaginal Hysterectomy;  Recorded: 02/25/2012;  Comments: for hypermenorrhea/ prolapse; Dr. Octavio LYNN APPENDECTOMY      Description: Appendectomy;  Recorded: 10/28/2008;      Prior diagnostic imaging/testing results:       08/2023 brain MRI  IMPRESSION:  1.  Brain atrophy and presumed chronic microvascular ischemic changes as detailed above. No dementia specific pattern identified.  2.  No acute intracranial process.  C -spine x ray 04/2023  IMPRESSION: There appears to be some degree of diffuse osseous  demineralization which limits evaluation for fracture. No gross  vertebral body height loss identified. There appears to be minimal  anterolisthesis of C4 on C5 and C7 on T1, and minimal retrolisthesis  of C5 on C6 and possibly C6 on C7. Straightening of the normal  cervical lordosis. Minimal levoconvex curvature centered near the  cervicothoracic junction.     Moderate to severe disc space narrowing at C5-C6 and C6-C7 with lesser  degrees of disc space narrowing elsewhere. Marginal multilevel  endplate osteophytes. Multilevel degenerative facet disease. The  prevertebral soft tissues appear normal. Suboptimal open-mouth  odontoid view due to radiographic overlap. No displaced fracture of  the base of the dens is identified, and the lateral masses of C1 and  C2 appear grossly symmetric.  Prior therapy history for the same diagnosis,  illness or injury: No      Prior Level of Function  Transfers: Independent  Ambulation: Independent  ADL: Independent  IADL: Housekeeping, Laundry, Meal preparation, Medication management    Living Environment  Social support: With a significant other or spouse   Type of home: Apartment/condo   Stairs to enter the home: Yes 1 Is there a railing: Yes     Ramp: Yes   Stairs inside the home: Yes 1 Is there a railing: Yes     Help at home: Home management tasks (cooking, cleaning)  Equipment owned: Raised toilet seat     Employment: Not Applicable    Hobbies/Interests: work out with     Patient goals for therapy: no    Pain assessment:  Patient reports low back and leg pain. Regular neck pain and HA, currently low back pain and L LE pain.     Objective      Cognitive Status Examination  Orientation:  Difficult to tell cognition appears forgetful requires assist for accurate answering of questions.    Level of Consciousness: Alert  Follows Commands and Answers Questions: 100% of the time  Personal Safety and Judgement: At risk behaviors demonstrated  Memory: Impaired    OBSERVATION: uncomfortable due to low back and L Leg pain  INTEGUMENTARY: Intact  POSTURE: Sitting Posture: Rounded shoulders, Forward head, slouched  PALPATION: tension with palpation to suboccipital area  RANGE OF MOTION:  see cervical screen  STRENGTH: assess in future as indicated    BED MOBILITY: Mod Assist    TRANSFERS: Mod Assist    WHEELCHAIR MOBILITY: not assessed    GAIT:   Level of [place]: Min Assist, spouse provided assistance  Assistive Device(s): None  Gait Deviations: Antalgic  Base of support increased  Stride length decreased  Merry decreased  Gait Distance: 70 feet  Stairs: not assessed    BALANCE: Sitting Balance (static):Good  Sitting Balance (dynamic):Good  Sit to Stand Balance:Poor, requires use of UE for balance  Standing Balance (static):Fair  Standing Balance (dynamic):Poor    Assess further at future  visits    SENSATION: not assessed    REFLEXES: not assessed  COORDINATION: not assessed  MUSCLE TONE: WFL       VESTIBULAR EVALUATION  ADDITIONAL HISTORY:  Description of symptoms: Off balance; Likely to fall  Dizzy attacks:   Start: 2022   Last attack: I have vertigo   Frequency of occurrences: constantly   Length of attack: 15 minutes  Difficulty hearing: Both ears  Noise in ears? No    Alleviates symptoms: my  helps me  Worsens symptoms: standing up  Activities that bring on symptoms: Bending over; Unstable surfaces       Pertinent visual history: eye exam every other year reports no sudden change, has B hearing aids  Pertinent history of current vestibular problem:  frequent HA, future appointment with ENT, no other diagnosis  DHI: Total Score: (Patient-Rptd) 80    Cervicogenic Screen    Neck ROM Severe impairment with flexion, extension, SB, rotation WFL, 30 degrees in each direction flex/ext, SB 10-15 degrees, Rot R 60, L 58   Vertebral Artery Test Declined assessment uncomfortable   Alar Ligament Test Normal   Transverse Ligament Test Normal   Distraction Normal   Neck Torsion Test (head still, body rotating)    Neck Torsion Test (head and body rotating)         Oculomotor Screen    Ocular ROM Normal   Smooth Pursuit Normal   Saccades Normal   VOR Able to complete at slow speed using VORx 1 method   VOR Cancellation Abnormal and increased sx   Head Impulse Test    Convergence Testing         Infrared Goggle Exam Vestibular Suppressant in Last 24 Hours?   Exam Completed With:    Spontaneous Nystagmus    Gaze Evoked Nystagmus    Head Shake Horizontal Nystagmus    Positional Testing    Supine Head-Hanging Test     Left Right   [place]-Hallpike     Sidelying Test     HSCC Supine Roll Test     HSCC Forward Roll Test     [place] and Lean Test -  Sitting Erect    [place] and Lean Test - Seated, Head Bent 60 Degrees Forward    [place] and Lean Test - Seated, Head Bent Backwards       BPPV Canal(s):   BPPV Type:     Dynamic  Visual Acuity (DVA)    Static Acuity (LogMar)    Horizontal Head Movement at 1 Hz (LogMar)    Horizontal Head Movement at 2 Hz (LogMar)           Assessment & Plan   CLINICAL IMPRESSIONS  Medical Diagnosis: R42 (ICD-10-CM) - Vertigo    Treatment Diagnosis: BPPV unspecified laterality, vertigo other origin, dizziness, HA, neck pain   Impression/Assessment: Patient is a 74 year old female with dizziness, unable to complete full assessment until improved neck ROM and decreased sx at low back based on patient report of symptoms and assessment potentially cervicogenic dizziness along with potential BPPV.  The following significant findings have been identified: Pain, Decreased ROM/flexibility, Decreased joint mobility, Decreased strength, Impaired balance, Decreased proprioception, Impaired gait, Impaired muscle performance, Decreased activity tolerance, Impaired posture, and Dizziness. These impairments interfere with their ability to perform self care tasks, recreational activities, household chores, household mobility, and community mobility as compared to previous level of function.     Clinical Decision Making (Complexity):  Clinical Presentation: Stable/Uncomplicated  Clinical Presentation Rationale: based on medical and personal factors listed in PT evaluation  Clinical Decision Making (Complexity): Low complexity    PLAN OF CARE  Treatment Interventions:  Interventions: Gait Training, Manual Therapy, Neuromuscular Re-education, Therapeutic Activity, Therapeutic Exercise, Self-Care/Home Management, Canalith Repositioning    Long Term Goals     PT Goal 1  Goal Identifier: TUG  Goal Description: Patient will improve on TUG by 4 seconds indicating improved balance and safety with mobility.  Rationale: to maximize safety and independence with performance of ADLs and functional tasks;to maximize safety and independence within the home;to maximize safety and independence within the community;to maximize safety and  independence with transportation;to maximize safety and independence with self cares  Goal Progress: initiated  Target Date: 07/07/25  PT Goal 2  Goal Identifier: DHI  Goal Description: Patient will improve by 20 points or more indicating improved functional mobility with decreased dizziness.  Rationale: to maximize safety and independence with performance of ADLs and functional tasks;to maximize safety and independence within the home;to maximize safety and independence within the community;to maximize safety and independence with transportation;to maximize safety and independence with self cares  Goal Progress: initiated  Target Date: 07/07/25  PT Goal 3  Goal Identifier: HEP  Goal Description: Patient will be able to complete 6 exercises for progression to independent management.  Rationale: to maximize safety and independence with performance of ADLs and functional tasks;to maximize safety and independence within the home;to maximize safety and independence within the community;to maximize safety and independence with transportation;to maximize safety and independence with self cares  Goal Progress: initiated  Target Date: 07/07/25      Frequency of Treatment: 1 x weekly decreasing as able  Duration of Treatment: 12    Recommended Referrals to Other Professionals:  none at this time  Education Assessment:   Learner/Method: Patient;Listening;Demonstration;Pictures/Video    Risks and benefits of evaluation/treatment have been explained.   Patient/Family/caregiver agrees with Plan of Care.     Evaluation Time:     PT Eval, Low Complexity Minutes (87924): 30       Signing Clinician: CARLIN Doan [facility]                                                                                   OUTPATIENT PHYSICAL THERAPY      PLAN OF TREATMENT FOR OUTPATIENT REHABILITATION   Patient's Last Name, First Name, Cliff Salazar YOB: 1950   Provider's Name   FERNANDO  [facility]   Medical Record No.  [id number]     Onset Date: 04/08/25 (2022 hospitalized)  Start of Care Date: 04/14/25     Medical Diagnosis:  R42 (ICD-10-CM) - Vertigo      PT Treatment Diagnosis:  BPPV unspecified laterality, vertigo other origin, dizziness, HA, neck pain Plan of Treatment  Frequency/Duration: 1 x weekly decreasing as able/ 12    Certification date from 04/14/25 to 07/07/25         See note for plan of treatment details and functional goals     Maria Luz Streeter, PT                         I CERTIFY THE NEED FOR THESE SERVICES FURNISHED UNDER        THIS PLAN OF TREATMENT AND WHILE UNDER MY CARE     (Physician attestation of this document indicates review and certification of the therapy plan).              Referring Provider:  Hyun Mcgvoern    Initial Assessment  See Epic Evaluation- Start of Care Date: 04/14/25

## 2025-04-15 DIAGNOSIS — M54.50 ACUTE LEFT-SIDED LOW BACK PAIN WITHOUT SCIATICA: Primary | ICD-10-CM

## 2025-04-15 DIAGNOSIS — Z91.81 PERSONAL HISTORY OF FALL: ICD-10-CM

## 2025-04-15 DIAGNOSIS — M79.605 PAIN OF LEFT LOWER EXTREMITY: ICD-10-CM

## 2025-04-17 ENCOUNTER — HOSPITAL ENCOUNTER (OUTPATIENT)
Dept: CT IMAGING | Facility: HOSPITAL | Age: 75
Discharge: HOME | End: 2025-04-17
Attending: FAMILY MEDICINE
Payer: COMMERCIAL
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
## 2025-04-17 DIAGNOSIS — R41.3 MEMORY LOSS: ICD-10-CM

## 2025-04-17 PROCEDURE — 70450 CT HEAD/BRAIN W/O DYE: CPT

## 2025-04-24 ENCOUNTER — THERAPY VISIT (OUTPATIENT)
Dept: PHYSICAL THERAPY | Facility: REHABILITATION | Age: 75
End: 2025-04-24
Attending: FAMILY MEDICINE
Payer: COMMERCIAL

## 2025-04-24 DIAGNOSIS — Z91.81 AT HIGH RISK FOR FALLS: ICD-10-CM

## 2025-04-24 DIAGNOSIS — R42 VERTIGO: Primary | ICD-10-CM

## 2025-04-24 DIAGNOSIS — H81.10 BENIGN PAROXYSMAL POSITIONAL VERTIGO, UNSPECIFIED LATERALITY: ICD-10-CM

## 2025-04-29 ENCOUNTER — THERAPY VISIT (OUTPATIENT)
Dept: PHYSICAL THERAPY | Facility: REHABILITATION | Age: 75
End: 2025-04-29
Payer: COMMERCIAL

## 2025-04-29 DIAGNOSIS — Z91.81 AT HIGH RISK FOR FALLS: ICD-10-CM

## 2025-04-29 DIAGNOSIS — R42 VERTIGO: Primary | ICD-10-CM

## 2025-04-29 DIAGNOSIS — H81.10 BENIGN PAROXYSMAL POSITIONAL VERTIGO, UNSPECIFIED LATERALITY: ICD-10-CM

## 2025-04-29 PROCEDURE — 97112 NEUROMUSCULAR REEDUCATION: CPT | Mod: GP | Performed by: PHYSICAL THERAPIST

## 2025-05-06 ENCOUNTER — THERAPY VISIT (OUTPATIENT)
Dept: PHYSICAL THERAPY | Facility: REHABILITATION | Age: 75
End: 2025-05-06
Payer: COMMERCIAL

## 2025-05-06 DIAGNOSIS — Z91.81 AT HIGH RISK FOR FALLS: ICD-10-CM

## 2025-05-06 DIAGNOSIS — R42 VERTIGO: Primary | ICD-10-CM

## 2025-05-06 DIAGNOSIS — H81.10 BENIGN PAROXYSMAL POSITIONAL VERTIGO, UNSPECIFIED LATERALITY: ICD-10-CM

## 2025-05-06 PROCEDURE — 97112 NEUROMUSCULAR REEDUCATION: CPT | Mod: GP | Performed by: PHYSICAL THERAPIST

## 2025-05-13 ENCOUNTER — TRANSFERRED RECORDS (OUTPATIENT)
Dept: HEALTH INFORMATION MANAGEMENT | Facility: CLINIC | Age: 75
End: 2025-05-13

## 2025-05-13 ENCOUNTER — THERAPY VISIT (OUTPATIENT)
Dept: PHYSICAL THERAPY | Facility: REHABILITATION | Age: 75
End: 2025-05-13
Payer: COMMERCIAL

## 2025-05-13 DIAGNOSIS — H81.10 BENIGN PAROXYSMAL POSITIONAL VERTIGO, UNSPECIFIED LATERALITY: ICD-10-CM

## 2025-05-13 DIAGNOSIS — R42 VERTIGO: Primary | ICD-10-CM

## 2025-05-13 DIAGNOSIS — Z91.81 AT HIGH RISK FOR FALLS: ICD-10-CM

## 2025-05-13 PROCEDURE — 97110 THERAPEUTIC EXERCISES: CPT | Mod: GP | Performed by: PHYSICAL THERAPIST

## 2025-05-13 PROCEDURE — 95992 CANALITH REPOSITIONING PROC: CPT | Mod: GP | Performed by: PHYSICAL THERAPIST

## 2025-05-13 PROCEDURE — 97112 NEUROMUSCULAR REEDUCATION: CPT | Mod: GP | Performed by: PHYSICAL THERAPIST

## 2025-05-20 ENCOUNTER — THERAPY VISIT (OUTPATIENT)
Dept: PHYSICAL THERAPY | Facility: REHABILITATION | Age: 75
End: 2025-05-20
Payer: COMMERCIAL

## 2025-05-20 DIAGNOSIS — Z91.81 AT HIGH RISK FOR FALLS: ICD-10-CM

## 2025-05-20 DIAGNOSIS — H81.10 BENIGN PAROXYSMAL POSITIONAL VERTIGO, UNSPECIFIED LATERALITY: ICD-10-CM

## 2025-05-20 DIAGNOSIS — R42 VERTIGO: Primary | ICD-10-CM

## 2025-05-20 PROCEDURE — 97750 PHYSICAL PERFORMANCE TEST: CPT | Mod: GP | Performed by: PHYSICAL THERAPIST

## 2025-05-20 PROCEDURE — 97110 THERAPEUTIC EXERCISES: CPT | Mod: GP | Performed by: PHYSICAL THERAPIST

## 2025-05-20 PROCEDURE — 97112 NEUROMUSCULAR REEDUCATION: CPT | Mod: GP | Performed by: PHYSICAL THERAPIST

## 2025-06-03 ENCOUNTER — THERAPY VISIT (OUTPATIENT)
Dept: PHYSICAL THERAPY | Facility: REHABILITATION | Age: 75
End: 2025-06-03
Payer: COMMERCIAL

## 2025-06-03 DIAGNOSIS — R42 VERTIGO: Primary | ICD-10-CM

## 2025-06-03 DIAGNOSIS — Z91.81 AT HIGH RISK FOR FALLS: ICD-10-CM

## 2025-06-03 DIAGNOSIS — H81.10 BENIGN PAROXYSMAL POSITIONAL VERTIGO, UNSPECIFIED LATERALITY: ICD-10-CM

## 2025-06-03 PROCEDURE — 97750 PHYSICAL PERFORMANCE TEST: CPT | Mod: GP | Performed by: PHYSICAL THERAPIST

## 2025-06-03 PROCEDURE — 95992 CANALITH REPOSITIONING PROC: CPT | Mod: GP | Performed by: PHYSICAL THERAPIST

## 2025-06-13 PROCEDURE — 81401 MOPATH PROCEDURE LEVEL 2: CPT | Mod: ORL | Performed by: PSYCHIATRY & NEUROLOGY

## 2025-06-13 PROCEDURE — 82306 VITAMIN D 25 HYDROXY: CPT | Mod: ORL | Performed by: PSYCHIATRY & NEUROLOGY

## 2025-06-16 ENCOUNTER — PATIENT OUTREACH (OUTPATIENT)
Dept: CARE COORDINATION | Facility: CLINIC | Age: 75
End: 2025-06-16
Payer: COMMERCIAL

## 2025-06-17 ENCOUNTER — MEDICAL CORRESPONDENCE (OUTPATIENT)
Dept: HEALTH INFORMATION MANAGEMENT | Facility: CLINIC | Age: 75
End: 2025-06-17
Payer: COMMERCIAL

## 2025-06-30 ENCOUNTER — PATIENT OUTREACH (OUTPATIENT)
Dept: CARE COORDINATION | Facility: CLINIC | Age: 75
End: 2025-06-30
Payer: COMMERCIAL

## 2025-07-08 ENCOUNTER — PATIENT OUTREACH (OUTPATIENT)
Dept: CARE COORDINATION | Facility: CLINIC | Age: 75
End: 2025-07-08
Payer: COMMERCIAL

## 2025-08-07 ENCOUNTER — TELEPHONE (OUTPATIENT)
Dept: NEUROLOGY | Facility: CLINIC | Age: 75
End: 2025-08-07

## 2025-08-17 ENCOUNTER — HEALTH MAINTENANCE LETTER (OUTPATIENT)
Age: 75
End: 2025-08-17

## 2025-08-20 ENCOUNTER — OFFICE VISIT (OUTPATIENT)
Dept: NEUROLOGY | Facility: CLINIC | Age: 75
End: 2025-08-20
Payer: COMMERCIAL

## 2025-08-20 VITALS
SYSTOLIC BLOOD PRESSURE: 98 MMHG | OXYGEN SATURATION: 100 % | TEMPERATURE: 97.3 F | DIASTOLIC BLOOD PRESSURE: 61 MMHG | HEIGHT: 65 IN | HEART RATE: 94 BPM | BODY MASS INDEX: 19.64 KG/M2

## 2025-08-20 DIAGNOSIS — F32.A DEPRESSION, UNSPECIFIED DEPRESSION TYPE: ICD-10-CM

## 2025-08-20 DIAGNOSIS — F03.A0 MILD DEMENTIA, UNSPECIFIED DEMENTIA TYPE, UNSPECIFIED WHETHER BEHAVIORAL, PSYCHOTIC, OR MOOD DISTURBANCE OR ANXIETY (H): Primary | ICD-10-CM

## 2025-08-20 DIAGNOSIS — F41.9 ANXIETY: ICD-10-CM

## 2025-08-20 RX ORDER — ESCITALOPRAM OXALATE 5 MG/1
5 TABLET ORAL DAILY
Qty: 90 TABLET | Refills: 2 | Status: SHIPPED | OUTPATIENT
Start: 2025-08-20

## 2025-08-22 ENCOUNTER — VIRTUAL VISIT (OUTPATIENT)
Dept: PSYCHIATRY | Facility: CLINIC | Age: 75
End: 2025-08-22
Payer: COMMERCIAL

## 2025-08-22 DIAGNOSIS — F43.20 ADJUSTMENT DISORDER, UNSPECIFIED TYPE: Primary | ICD-10-CM

## 2025-09-02 ENCOUNTER — LAB REQUISITION (OUTPATIENT)
Dept: LAB | Facility: CLINIC | Age: 75
End: 2025-09-02
Payer: COMMERCIAL

## 2025-09-02 DIAGNOSIS — D75.89 OTHER SPECIFIED DISEASES OF BLOOD AND BLOOD-FORMING ORGANS: ICD-10-CM

## 2025-09-02 DIAGNOSIS — R42 DIZZINESS AND GIDDINESS: ICD-10-CM

## 2025-09-02 DIAGNOSIS — R63.4 ABNORMAL WEIGHT LOSS: ICD-10-CM

## 2025-09-02 LAB — ERYTHROCYTE [SEDIMENTATION RATE] IN BLOOD BY WESTERGREN METHOD: 9 MM/HR (ref 0–30)

## 2025-09-03 LAB
B BURGDOR IGG+IGM SER QL: 0.11
PROT ELPH PNL UR ELPH: NORMAL
PROT PATTERN SERPL IFE-IMP: NORMAL

## 2025-09-04 LAB
COPPER SERPL-MCNC: 88.9 UG/DL
ZINC SERPL-MCNC: 65.8 UG/DL

## (undated) DEVICE — SU VICRYL 0 CT-1 27" UND J260H

## (undated) DEVICE — Device

## (undated) DEVICE — LIGHT HANDLE X2

## (undated) DEVICE — PACK SET-UP STD 9102

## (undated) DEVICE — NDL 22GA 1.5"

## (undated) DEVICE — SYR BULB IRRIG 50ML LATEX FREE 0035280

## (undated) DEVICE — GLOVE PROTEXIS POWDER FREE 8.5 ORTHOPEDIC 2D73ET85

## (undated) DEVICE — CATH TRAY FOLEY SURESTEP 16FR WDRAIN BAG STLK LATEX A300316A

## (undated) DEVICE — SYR 50ML CATH TIP W/O NDL 309620

## (undated) DEVICE — SU SILK 2-0 TIE 12X30" A305H

## (undated) DEVICE — SOL NACL 0.9% IRRIG 1000ML BOTTLE 2F7124

## (undated) DEVICE — IMM KIT SHOULDER STABILIZATION 7210573

## (undated) DEVICE — GLOVE PROTEXIS BLUE W/NEU-THERA 7.0  2D73EB70

## (undated) DEVICE — COVER CAMERA IN-LIGHT DISP LT-C02

## (undated) DEVICE — ESU ELEC NDL 1" E1552

## (undated) DEVICE — STRAP KNEE/BODY 31143004

## (undated) DEVICE — ESU CLEANER TIP 31142717

## (undated) DEVICE — GLOVE PROTEXIS W/NEU-THERA 6.5  2D73TE65

## (undated) DEVICE — LINEN TOWEL PACK X5 5464

## (undated) DEVICE — ESU GROUND PAD ADULT W/CORD E7507

## (undated) DEVICE — DRSG TEGADERM 4X4 3/4" 1626W

## (undated) DEVICE — DRAPE U-POUCH 34X29" 1067

## (undated) DEVICE — DRAIN HEMOVAC RESERVOIR KIT 10FR 1/8" MED 00-2550-002-10

## (undated) DEVICE — DRAPE IOBAN INCISE 23X17" 6650EZ

## (undated) DEVICE — DRAPE U-DRAPE 1015NSD NON-STERILE

## (undated) DEVICE — BLADE SAW SAGITTAL STRK 20.7X85X0.89MM 2108-109-000S13

## (undated) DEVICE — SU VICRYL 2-0 CT-1 27" UND J259H

## (undated) DEVICE — GLOVE PROTEXIS W/NEU-THERA 8.5  2D73TE85

## (undated) DEVICE — PAD ARMBOARD FOAM EGGCRATE COVIDEN 3114367

## (undated) DEVICE — SU MONOCRYL 3-0 PS-1 27" Y936H

## (undated) DEVICE — SUCTION MANIFOLD DORNOCH ULTRA CART UL-CL500

## (undated) DEVICE — BONE CLEANING TIP INTERPULSE  0210-010-000

## (undated) DEVICE — SU ETHIBOND 2 V-37 4X30" MX69G

## (undated) DEVICE — IMM KIT SHOULDER TMAX MASK FACE 7210559

## (undated) DEVICE — ESU PENCIL W/HOLSTER

## (undated) DEVICE — RESTRAINT LIMB HOLDER ANKLE/WRIST FOAM W/QUICK RELEASE 2533

## (undated) DEVICE — HYDROGEN PEROXIDE 3% 16OZ D0012

## (undated) DEVICE — SU ETHIBOND 1 CTX CR 8/18" CX30D

## (undated) DEVICE — DRSG STERI STRIP 1/2X4" R1547

## (undated) DEVICE — BONE CEMENT MIXEVAC III HI VAC KIT  0206-015-000

## (undated) DEVICE — SOL WATER IRRIG 1000ML BOTTLE 2F7114

## (undated) DEVICE — LINEN ORTHO PACK 5446

## (undated) DEVICE — SU FIBERWIRE 5 CCS-1 BLUE  AR-7211

## (undated) DEVICE — SU FIBERWIRE 2 38"  AR-7200

## (undated) DEVICE — DEVICE RETRIEVER HEWSON 71111579

## (undated) DEVICE — SUCTION IRR SYSTEM W/O TIP INTERPULSE HANDPIECE 0210-100-000

## (undated) DEVICE — SPONGE SINGLE KEEL ZIM STERILE 4301-34-01

## (undated) DEVICE — DRSG AQUACEL AG 3.5X9.75" HYDROFIBER 412011

## (undated) DEVICE — DRSG DRAIN 4X4" 7086

## (undated) RX ORDER — FENTANYL CITRATE 50 UG/ML
INJECTION, SOLUTION INTRAMUSCULAR; INTRAVENOUS
Status: DISPENSED
Start: 2017-02-01

## (undated) RX ORDER — ONDANSETRON 2 MG/ML
INJECTION INTRAMUSCULAR; INTRAVENOUS
Status: DISPENSED
Start: 2017-02-01

## (undated) RX ORDER — CLINDAMYCIN PHOSPHATE 900 MG/50ML
INJECTION, SOLUTION INTRAVENOUS
Status: DISPENSED
Start: 2017-02-01